# Patient Record
Sex: MALE | Race: BLACK OR AFRICAN AMERICAN | Employment: FULL TIME | URBAN - METROPOLITAN AREA
[De-identification: names, ages, dates, MRNs, and addresses within clinical notes are randomized per-mention and may not be internally consistent; named-entity substitution may affect disease eponyms.]

---

## 2017-01-04 ENCOUNTER — DOCUMENTATION ONLY (OUTPATIENT)
Dept: INTERNAL MEDICINE CLINIC | Age: 32
End: 2017-01-04

## 2017-01-04 NOTE — PROGRESS NOTES
NN chart review reveals pt has not responded to this writer contact attempts nor does he see PCP regularly. He has not been hospitalized nor seen in the ED for the same matter. This writer will close this episode.

## 2017-01-23 RX ORDER — LISINOPRIL AND HYDROCHLOROTHIAZIDE 20; 25 MG/1; MG/1
TABLET ORAL
Qty: 60 TAB | Refills: 0 | Status: ON HOLD | OUTPATIENT
Start: 2017-01-23 | End: 2017-07-06

## 2017-07-05 ENCOUNTER — HOSPITAL ENCOUNTER (INPATIENT)
Age: 32
LOS: 1 days | Discharge: HOME | DRG: 287 | End: 2017-07-07
Attending: EMERGENCY MEDICINE | Admitting: STUDENT IN AN ORGANIZED HEALTH CARE EDUCATION/TRAINING PROGRAM
Payer: SELF-PAY

## 2017-07-05 ENCOUNTER — APPOINTMENT (OUTPATIENT)
Dept: NUCLEAR MEDICINE | Age: 32
DRG: 287 | End: 2017-07-05
Attending: INTERNAL MEDICINE
Payer: SELF-PAY

## 2017-07-05 ENCOUNTER — APPOINTMENT (OUTPATIENT)
Dept: GENERAL RADIOLOGY | Age: 32
DRG: 287 | End: 2017-07-05
Attending: EMERGENCY MEDICINE
Payer: SELF-PAY

## 2017-07-05 DIAGNOSIS — I15.9 SECONDARY HYPERTENSION: Primary | ICD-10-CM

## 2017-07-05 DIAGNOSIS — R77.8 ELEVATED TROPONIN: ICD-10-CM

## 2017-07-05 PROBLEM — I10 UNCONTROLLED HYPERTENSION: Status: ACTIVE | Noted: 2017-07-05

## 2017-07-05 PROBLEM — R07.9 CHEST PAIN: Status: ACTIVE | Noted: 2017-07-05

## 2017-07-05 LAB
ALBUMIN SERPL BCP-MCNC: 3.2 G/DL (ref 3.5–5)
ALBUMIN/GLOB SERPL: 0.9 {RATIO} (ref 1.1–2.2)
ALP SERPL-CCNC: 52 U/L (ref 45–117)
ALT SERPL-CCNC: 48 U/L (ref 12–78)
AMPHET UR QL SCN: NEGATIVE
ANION GAP BLD CALC-SCNC: 6 MMOL/L (ref 5–15)
APPEARANCE UR: CLEAR
AST SERPL W P-5'-P-CCNC: 36 U/L (ref 15–37)
ATRIAL RATE: 77 BPM
BACTERIA URNS QL MICRO: NEGATIVE /HPF
BARBITURATES UR QL SCN: NEGATIVE
BASOPHILS # BLD AUTO: 0 K/UL (ref 0–0.1)
BASOPHILS # BLD: 0 % (ref 0–1)
BENZODIAZ UR QL: NEGATIVE
BILIRUB SERPL-MCNC: 0.5 MG/DL (ref 0.2–1)
BILIRUB UR QL: NEGATIVE
BUN SERPL-MCNC: 9 MG/DL (ref 6–20)
BUN/CREAT SERPL: 10 (ref 12–20)
CALCIUM SERPL-MCNC: 8.3 MG/DL (ref 8.5–10.1)
CALCULATED P AXIS, ECG09: 41 DEGREES
CALCULATED R AXIS, ECG10: 15 DEGREES
CALCULATED T AXIS, ECG11: -142 DEGREES
CANNABINOIDS UR QL SCN: POSITIVE
CHLORIDE SERPL-SCNC: 102 MMOL/L (ref 97–108)
CK MB CFR SERPL CALC: 0.5 % (ref 0–2.5)
CK MB CFR SERPL CALC: 0.5 % (ref 0–2.5)
CK MB SERPL-MCNC: 3.1 NG/ML (ref 5–25)
CK MB SERPL-MCNC: 3.7 NG/ML (ref 5–25)
CK SERPL-CCNC: 639 U/L (ref 39–308)
CK SERPL-CCNC: 639 U/L (ref 39–308)
CK SERPL-CCNC: 696 U/L (ref 39–308)
CO2 SERPL-SCNC: 32 MMOL/L (ref 21–32)
COCAINE UR QL SCN: NEGATIVE
COLOR UR: NORMAL
CREAT SERPL-MCNC: 0.89 MG/DL (ref 0.7–1.3)
DIAGNOSIS, 93000: NORMAL
DRUG SCRN COMMENT,DRGCM: ABNORMAL
EOSINOPHIL # BLD: 0.2 K/UL (ref 0–0.4)
EOSINOPHIL NFR BLD: 4 % (ref 0–7)
EPITH CASTS URNS QL MICRO: NORMAL /LPF
ERYTHROCYTE [DISTWIDTH] IN BLOOD BY AUTOMATED COUNT: 12.7 % (ref 11.5–14.5)
GLOBULIN SER CALC-MCNC: 3.7 G/DL (ref 2–4)
GLUCOSE SERPL-MCNC: 144 MG/DL (ref 65–100)
GLUCOSE UR STRIP.AUTO-MCNC: NEGATIVE MG/DL
HCT VFR BLD AUTO: 44.9 % (ref 36.6–50.3)
HGB BLD-MCNC: 15.3 G/DL (ref 12.1–17)
HGB UR QL STRIP: NEGATIVE
HYALINE CASTS URNS QL MICRO: NORMAL /LPF (ref 0–5)
KETONES UR QL STRIP.AUTO: NEGATIVE MG/DL
LEUKOCYTE ESTERASE UR QL STRIP.AUTO: NEGATIVE
LYMPHOCYTES # BLD AUTO: 49 % (ref 12–49)
LYMPHOCYTES # BLD: 2.9 K/UL (ref 0.8–3.5)
MCH RBC QN AUTO: 29.4 PG (ref 26–34)
MCHC RBC AUTO-ENTMCNC: 34.1 G/DL (ref 30–36.5)
MCV RBC AUTO: 86.3 FL (ref 80–99)
METHADONE UR QL: NEGATIVE
MONOCYTES # BLD: 0.5 K/UL (ref 0–1)
MONOCYTES NFR BLD AUTO: 9 % (ref 5–13)
NEUTS SEG # BLD: 2.2 K/UL (ref 1.8–8)
NEUTS SEG NFR BLD AUTO: 38 % (ref 32–75)
NITRITE UR QL STRIP.AUTO: NEGATIVE
OPIATES UR QL: NEGATIVE
P-R INTERVAL, ECG05: 164 MS
PCP UR QL: NEGATIVE
PH UR STRIP: 7 [PH] (ref 5–8)
PLATELET # BLD AUTO: 176 K/UL (ref 150–400)
POTASSIUM SERPL-SCNC: 3.1 MMOL/L (ref 3.5–5.1)
PROT SERPL-MCNC: 6.9 G/DL (ref 6.4–8.2)
PROT UR STRIP-MCNC: NEGATIVE MG/DL
Q-T INTERVAL, ECG07: 414 MS
QRS DURATION, ECG06: 104 MS
QTC CALCULATION (BEZET), ECG08: 468 MS
RBC # BLD AUTO: 5.2 M/UL (ref 4.1–5.7)
RBC #/AREA URNS HPF: NORMAL /HPF (ref 0–5)
SODIUM SERPL-SCNC: 140 MMOL/L (ref 136–145)
SP GR UR REFRACTOMETRY: 1.01 (ref 1–1.03)
TROPONIN I SERPL-MCNC: 0.07 NG/ML
TROPONIN I SERPL-MCNC: 0.07 NG/ML
TROPONIN I SERPL-MCNC: 0.08 NG/ML
UA: UC IF INDICATED,UAUC: NORMAL
UROBILINOGEN UR QL STRIP.AUTO: 0.2 EU/DL (ref 0.2–1)
VENTRICULAR RATE, ECG03: 77 BPM
WBC # BLD AUTO: 5.9 K/UL (ref 4.1–11.1)
WBC URNS QL MICRO: NORMAL /HPF (ref 0–4)

## 2017-07-05 PROCEDURE — 74011250637 HC RX REV CODE- 250/637: Performed by: EMERGENCY MEDICINE

## 2017-07-05 PROCEDURE — 93005 ELECTROCARDIOGRAM TRACING: CPT

## 2017-07-05 PROCEDURE — 84484 ASSAY OF TROPONIN QUANT: CPT | Performed by: EMERGENCY MEDICINE

## 2017-07-05 PROCEDURE — 85025 COMPLETE CBC W/AUTO DIFF WBC: CPT | Performed by: EMERGENCY MEDICINE

## 2017-07-05 PROCEDURE — 36415 COLL VENOUS BLD VENIPUNCTURE: CPT | Performed by: HOSPITALIST

## 2017-07-05 PROCEDURE — 71020 XR CHEST PA LAT: CPT

## 2017-07-05 PROCEDURE — 74011250637 HC RX REV CODE- 250/637: Performed by: INTERNAL MEDICINE

## 2017-07-05 PROCEDURE — 96374 THER/PROPH/DIAG INJ IV PUSH: CPT

## 2017-07-05 PROCEDURE — 93306 TTE W/DOPPLER COMPLETE: CPT

## 2017-07-05 PROCEDURE — 74011250636 HC RX REV CODE- 250/636: Performed by: INTERNAL MEDICINE

## 2017-07-05 PROCEDURE — 99218 HC RM OBSERVATION: CPT

## 2017-07-05 PROCEDURE — 80053 COMPREHEN METABOLIC PANEL: CPT | Performed by: EMERGENCY MEDICINE

## 2017-07-05 PROCEDURE — 99285 EMERGENCY DEPT VISIT HI MDM: CPT

## 2017-07-05 PROCEDURE — 81001 URINALYSIS AUTO W/SCOPE: CPT | Performed by: INTERNAL MEDICINE

## 2017-07-05 PROCEDURE — 80307 DRUG TEST PRSMV CHEM ANLYZR: CPT | Performed by: INTERNAL MEDICINE

## 2017-07-05 PROCEDURE — 74011250636 HC RX REV CODE- 250/636: Performed by: EMERGENCY MEDICINE

## 2017-07-05 PROCEDURE — 82553 CREATINE MB FRACTION: CPT | Performed by: EMERGENCY MEDICINE

## 2017-07-05 PROCEDURE — 82550 ASSAY OF CK (CPK): CPT | Performed by: EMERGENCY MEDICINE

## 2017-07-05 RX ORDER — CLONIDINE HYDROCHLORIDE 0.1 MG/1
0.1 TABLET ORAL 2 TIMES DAILY
Status: DISCONTINUED | OUTPATIENT
Start: 2017-07-05 | End: 2017-07-05

## 2017-07-05 RX ORDER — HYDRALAZINE HYDROCHLORIDE 20 MG/ML
10 INJECTION INTRAMUSCULAR; INTRAVENOUS
Status: COMPLETED | OUTPATIENT
Start: 2017-07-05 | End: 2017-07-05

## 2017-07-05 RX ORDER — HYDRALAZINE HYDROCHLORIDE 20 MG/ML
20 INJECTION INTRAMUSCULAR; INTRAVENOUS
Status: DISCONTINUED | OUTPATIENT
Start: 2017-07-05 | End: 2017-07-07 | Stop reason: HOSPADM

## 2017-07-05 RX ORDER — ASPIRIN 81 MG/1
81 TABLET ORAL DAILY
Status: DISCONTINUED | OUTPATIENT
Start: 2017-07-06 | End: 2017-07-07 | Stop reason: HOSPADM

## 2017-07-05 RX ORDER — SODIUM CHLORIDE 0.9 % (FLUSH) 0.9 %
5-10 SYRINGE (ML) INJECTION EVERY 8 HOURS
Status: DISCONTINUED | OUTPATIENT
Start: 2017-07-05 | End: 2017-07-07 | Stop reason: HOSPADM

## 2017-07-05 RX ORDER — CLONIDINE HYDROCHLORIDE 0.1 MG/1
0.1 TABLET ORAL 2 TIMES DAILY
Status: DISCONTINUED | OUTPATIENT
Start: 2017-07-05 | End: 2017-07-06

## 2017-07-05 RX ORDER — POTASSIUM CHLORIDE 750 MG/1
40 TABLET, FILM COATED, EXTENDED RELEASE ORAL
Status: COMPLETED | OUTPATIENT
Start: 2017-07-05 | End: 2017-07-05

## 2017-07-05 RX ORDER — SODIUM CHLORIDE 0.9 % (FLUSH) 0.9 %
5-10 SYRINGE (ML) INJECTION AS NEEDED
Status: DISCONTINUED | OUTPATIENT
Start: 2017-07-05 | End: 2017-07-07 | Stop reason: HOSPADM

## 2017-07-05 RX ORDER — HYDROCHLOROTHIAZIDE 25 MG/1
25 TABLET ORAL
Status: COMPLETED | OUTPATIENT
Start: 2017-07-05 | End: 2017-07-05

## 2017-07-05 RX ORDER — NITROGLYCERIN 0.4 MG/1
0.4 TABLET SUBLINGUAL
Status: DISCONTINUED | OUTPATIENT
Start: 2017-07-05 | End: 2017-07-07 | Stop reason: HOSPADM

## 2017-07-05 RX ORDER — ACETAMINOPHEN 325 MG/1
650 TABLET ORAL
Status: DISCONTINUED | OUTPATIENT
Start: 2017-07-05 | End: 2017-07-07 | Stop reason: HOSPADM

## 2017-07-05 RX ORDER — AMLODIPINE BESYLATE 5 MG/1
5 TABLET ORAL
Status: COMPLETED | OUTPATIENT
Start: 2017-07-05 | End: 2017-07-05

## 2017-07-05 RX ORDER — SODIUM CHLORIDE 0.9 % (FLUSH) 0.9 %
20 SYRINGE (ML) INJECTION
Status: DISCONTINUED | OUTPATIENT
Start: 2017-07-05 | End: 2017-07-05

## 2017-07-05 RX ORDER — LISINOPRIL 10 MG/1
20 TABLET ORAL
Status: COMPLETED | OUTPATIENT
Start: 2017-07-05 | End: 2017-07-05

## 2017-07-05 RX ADMIN — CLONIDINE HYDROCHLORIDE 0.1 MG: 0.1 TABLET ORAL at 22:50

## 2017-07-05 RX ADMIN — Medication 10 ML: at 13:13

## 2017-07-05 RX ADMIN — ENOXAPARIN SODIUM 160 MG: 100 INJECTION SUBCUTANEOUS at 13:12

## 2017-07-05 RX ADMIN — AMLODIPINE BESYLATE 5 MG: 5 TABLET ORAL at 08:38

## 2017-07-05 RX ADMIN — Medication 10 ML: at 22:47

## 2017-07-05 RX ADMIN — HYDROCHLOROTHIAZIDE 25 MG: 25 TABLET ORAL at 08:38

## 2017-07-05 RX ADMIN — LISINOPRIL 20 MG: 10 TABLET ORAL at 08:38

## 2017-07-05 RX ADMIN — CLONIDINE HYDROCHLORIDE 0.1 MG: 0.1 TABLET ORAL at 11:27

## 2017-07-05 RX ADMIN — HYDRALAZINE HYDROCHLORIDE 10 MG: 20 INJECTION INTRAMUSCULAR; INTRAVENOUS at 09:49

## 2017-07-05 RX ADMIN — ENOXAPARIN SODIUM 160 MG: 100 INJECTION SUBCUTANEOUS at 23:16

## 2017-07-05 RX ADMIN — POTASSIUM CHLORIDE 40 MEQ: 750 TABLET, FILM COATED, EXTENDED RELEASE ORAL at 22:46

## 2017-07-05 NOTE — CONSULTS
Consult Note    Date of  Admission: 7/5/2017  8:07 AM     Jeff Mcqueen is a 33 y.o. male admitted for Uncontrolled hypertension  Chest pain. Consult requested by Noy Sanchez MD    Assessment  · Hypertensive urgency due to noncompliance ( inability to get prescriptions refilled x 2 months due to change in PCP)  · Atypical chest pain mostly at rest for 2 months, left breast area, abnormal EKG suggestive of lateral ischemia;   · Elevated troponin 0.04 then 0.08  · Normal aortic silhouette on CXR    Recommendations:  1. Admit to hospitalist  2. Serial troponin  3. Stress testing tomorrow if no significant rise in markers, cath if there is a significant rise   4. Reestablish BP control  Thank you for this referral.  Jose R Walls MD  Subjective:  Chest pain x 2 months    Patient Active Problem List    Diagnosis Date Noted    Chest pain 07/05/2017    Uncontrolled hypertension 07/05/2017      Past Medical History:   Diagnosis Date    Depression     Hypertension       History reviewed. No pertinent surgical history. No Known Allergies          Review of Symptoms:  Constitutional: negative for fevers, chills, sweats, fatigue, malaise, anorexia and weight loss  Cardiac: chest pain described as low left breast, ; pertinent negatives - dyspnea, palpitations, syncope, fatigue, orthopnea, paroxysmal nocturnal dyspnea, exertional chest pressure/discomfort, claudication, lower extremity edema, tachypnea, dyspnea on exertion, dizziness  Respiratory: negative for cough or dyspnea on exertion  Gastrointestinal: negative for dysphagia, odynophagia, dyspepsia, reflux symptoms, nausea and vomiting  Musculoskeletal:negative for myalgias, arthralgias, stiff joints, neck pain and back pain  Neurological: negative for dizziness, vertigo and seizures  Other systems reviewed and negative except as above.     Physical Exam    Visit Vitals    BP (!) 174/115    Pulse 95    Temp 98.1 °F (36.7 °C)    Resp 28    Ht 5' 11\" (1.803 m)    Wt (!) 160.6 kg (354 lb 2 oz)    SpO2 98%    BMI 49.39 kg/m2     Neck: supple, symmetrical, trachea midline, no adenopathy, thyroid: not enlarged, symmetric, no tenderness/mass/nodules, no carotid bruit and no JVD  Heart: normal apical impulse, regular rate and rhythm, S1, S2 normal, S4 present  Lungs: clear to auscultation bilaterally, normal percussion bilaterally  Abdomen: soft, non-tender.  Bowel sounds normal. No masses,  no organomegaly  Extremities: extremities normal, atraumatic, no cyanosis or edema  Pulses: 2+ and symmetric  Neurologic: Grossly normal    Cardiographics    Telemetry: normal sinus rhythm  ECG: normal sinus rhythm, ischemic changes noted in inferolateral leads  Echocardiogram: Not done    Recent radiology, intake/output and wt reviewed    Lab Results  Component Value Date/Time   WBC 5.9 07/05/2017 08:41 AM   HGB 15.3 07/05/2017 08:41 AM   Hemoglobin (POC) 15.6 08/02/2010 07:05 PM   HCT 44.9 07/05/2017 08:41 AM   Hematocrit (POC) 46 08/02/2010 07:05 PM   PLATELET 694 02/50/3719 08:41 AM   MCV 86.3 07/05/2017 08:41 AM   Lab Results  Component Value Date/Time   Glucose 144 07/05/2017 08:41 AM   Glucose (POC) 90 08/02/2010 07:05 PM   Glucose (POC) 104 03/29/2010 07:47 PM   Glucose  10/31/2014 10:58 AM   LDL, calculated 126 01/20/2016 12:00 AM   Creatinine (POC) 0.9 08/02/2010 07:05 PM   Creatinine 0.89 07/05/2017 08:41 AM      Lab Results  Component Value Date/Time   Cholesterol, total 213 01/20/2016 12:00 AM   HDL Cholesterol 51 01/20/2016 12:00 AM   LDL, calculated 126 01/20/2016 12:00 AM   Triglyceride 180 01/20/2016 12:00 AM   No results found for: INR, PTMR, PTP, PT1, PT2   Lab Results  Component Value Date/Time   GFR est non-AA >60 07/05/2017 08:41 AM   GFRNA, POC >60 08/02/2010 07:05 PM   GFR est AA >60 07/05/2017 08:41 AM   GFRAA, POC >60 08/02/2010 07:05 PM   Creatinine 0.89 07/05/2017 08:41 AM   Creatinine (POC) 0.9 08/02/2010 07:05 PM   BUN 9 07/05/2017 08:41 AM BUN (POC) 9 08/02/2010 07:05 PM   Sodium 140 07/05/2017 08:41 AM   Sodium (POC) 138 08/02/2010 07:05 PM   Potassium 3.1 07/05/2017 08:41 AM   Potassium (POC) 3.5 08/02/2010 07:05 PM   Chloride 102 07/05/2017 08:41 AM   Chloride (POC) 101 08/02/2010 07:05 PM   CO2 32 07/05/2017 08:41 AM

## 2017-07-05 NOTE — PROGRESS NOTES
Admission Medication Reconciliation:    Information obtained from: Patient, chart review (4/25, Dr. Corry Garcia, conversation note)    Significant PMH/Disease States:   Past Medical History:   Diagnosis Date    Depression     Hypertension        Chief Complaint for this Admission:  Cough    Allergies:  Review of patient's allergies indicates no known allergies. Prior to Admission Medications:   Prior to Admission Medications   Prescriptions Last Dose Informant Patient Reported? Taking?   lisinopril-hydroCHLOROthiazide (PRINZIDE, ZESTORETIC) 20-25 mg per tablet 5/5/2017  No No   Sig: TAKE TWO TABLETS BY MOUTH ONCE DAILY      Facility-Administered Medications: None         Comments/Recommendations:   Reviewed allergie with patient (NKDA). Patient states that she hasn't taken lisinpril-HCTZ in ~2 months, as she \"can't reach physician for refills\". Deleted:  1. Albuterol (not used >6 months)  2. Amlodipine (does not take)  3. Paxil (\"makes me feel bad\")    Thank you for allowing me to participate in the care of this patient.     Fran Jacobs, Emile

## 2017-07-05 NOTE — H&P
History & Physical    Date of admission: 7/5/2017    Patient name: Sena Corral  MRN: [id number]  YOB: 1985  Age: 33 y.o. Primary care provider:  Robson Saavedra MD     Source of Information: patient, medical records                       Chief complain: chest pain    History of present illness  Sena Corral is a 33 y.o. male who presents with PMHx of HTN, off meds for 2 months as his PCP was retiring and could not find another. Pt presented to ER today due to chest pain on/off for 2 weeks intermittent, slightly pressures, with numbness and tingling of both hands. Pt denies any sob, diaphoresis, dizziness, lightheadedness. Pt did complain of HAs and Nause. No abdominal pain, diarrhea, LOC, weakness. Past Medical History:   Diagnosis Date    Depression     Hypertension       History reviewed. No pertinent surgical history. Prior to Admission medications    Medication Sig Start Date End Date Taking? Authorizing Provider   lisinopril-hydroCHLOROthiazide (PRINZIDE, ZESTORETIC) 20-25 mg per tablet TAKE TWO TABLETS BY MOUTH ONCE DAILY 1/23/17   Robson Saavedra MD     No Known Allergies   Family History   Problem Relation Age of Onset    Hypertension Mother     Hypertension Father       Family history reviewed and non-contributory. Social history  Patient resides  X  Independently      With family care      Assisted living      SNF    Ambulates  X  Independently      With cane       Assisted walker         Alcohol history     None   X  Social     Chronic   Smoking history    None     Former smoker   X  Current smoker     History   Smoking Status    Light Tobacco Smoker    Types: Cigarettes   Smokeless Tobacco    Never Used       Code status  X  Full code     DNR/DNI        Code status discussed with the patient/caregivers.   Full Code    Review of systems  The patient denies any fever, chills, chest pain, cough, congestion, recent illness, palpitations, or dysuria. A comprehensive review of systems was negative except for that written in the History of Present Illness. The remainder of the review of systems was reviewed and is noncontributory. Physical Examination   Visit Vitals    /69    Pulse 95    Temp 98.9 °F (37.2 °C)    Resp 16    Ht 5' 11\" (1.803 m)    Wt (!) 160.6 kg (354 lb 2 oz)    SpO2 98%    BMI 49.39 kg/m2          O2 Device: Room air    General:  Alert, cooperative, no distress   Head:  Normocephalic, without obvious abnormality, atraumatic   Eyes:  Conjunctivae/corneas clear. PERRL, EOMs intact   E/N/M/T: Nares normal. Septum midline. No nasal drainage or sinus tenderness  Lips, mucosa, and tongue normal   Teeth and gums normal  Clear oropharynx   Neck: Normal appearance and movements, symmetrical, trachea midline  No palpable adenopathy  No thyroid enlargement, tenderness or nodules  No carotid bruit   Normal JVP   Lungs:   Symmetrical chest expansion and respiratory effort  Clear to auscultation bilaterally   Chest wall:  No tenderness or deformity   Heart:  Regular rhythm   Sounds normal; no murmur, click, rub or gallop   Abdomen:   Soft, no tenderness  Bowel sounds normal  No masses or hepatosplenomegaly  No hernias present   Back: No CVA tenderness   Extremities: Extremities normal, atraumatic  No cyanosis or edema  No DVT signs   Pulses 2+ and symmetric all extremities   Musculo-      skeletal: Gait not tested  Normal symmetry, ROM, strength and tone   Neuro: Normal cranial nerves  Normal reflexes and sensation   Psych: Alert, oriented x3  Normal affect, judgement and insight   Geniturinary: deferred     Data Review    EKG:  NSR with PAC 77bpm, TWI in inferior and I and V6 which are old.      24 Hour Results:  Recent Results (from the past 24 hour(s))   EKG, 12 LEAD, INITIAL    Collection Time: 07/05/17  8:32 AM   Result Value Ref Range    Ventricular Rate 77 BPM    Atrial Rate 77 BPM    P-R Interval 164 ms    QRS Duration 104 ms    Q-T Interval 414 ms    QTC Calculation (Bezet) 468 ms    Calculated P Axis 41 degrees    Calculated R Axis 15 degrees    Calculated T Axis -142 degrees    Diagnosis       Sinus rhythm with premature atrial complexes  ST & T wave abnormality, consider inferolateral ischemia  Prolonged QT  When compared with ECG of 12-MAY-2015 18:00,  No significant change    Confirmed by Devonte Qureshi M.D., [facility] [employer] ([id number]) on 7/5/2017 10:43:39 AM     CBC WITH AUTOMATED DIFF    Collection Time: 07/05/17  8:41 AM   Result Value Ref Range    WBC 5.9 4.1 - 11.1 K/uL    RBC 5.20 4. 10 - 5.70 M/uL    HGB 15.3 12.1 - 17.0 g/dL    HCT 44.9 36.6 - 50.3 %    MCV 86.3 80.0 - 99.0 FL    MCH 29.4 26.0 - 34.0 PG    MCHC 34.1 30.0 - 36.5 g/dL    RDW 12.7 11.5 - 14.5 %    PLATELET 006 427 - 019 K/uL    NEUTROPHILS 38 32 - 75 %    LYMPHOCYTES 49 12 - 49 %    MONOCYTES 9 5 - 13 %    EOSINOPHILS 4 0 - 7 %    BASOPHILS 0 0 - 1 %    ABS. NEUTROPHILS 2.2 1.8 - 8.0 K/UL    ABS. LYMPHOCYTES 2.9 0.8 - 3.5 K/UL    ABS. MONOCYTES 0.5 0.0 - 1.0 K/UL    ABS. EOSINOPHILS 0.2 0.0 - 0.4 K/UL    ABS. BASOPHILS 0.0 0.0 - 0.1 K/UL   METABOLIC PANEL, COMPREHENSIVE    Collection Time: 07/05/17  8:41 AM   Result Value Ref Range    Sodium 140 136 - 145 mmol/L    Potassium 3.1 (L) 3.5 - 5.1 mmol/L    Chloride 102 97 - 108 mmol/L    CO2 32 21 - 32 mmol/L    Anion gap 6 5 - 15 mmol/L    Glucose 144 (H) 65 - 100 mg/dL    BUN 9 6 - 20 MG/DL    Creatinine 0.89 0.70 - 1.30 MG/DL    BUN/Creatinine ratio 10 (L) 12 - 20      GFR est AA >60 >60 ml/min/1.73m2    GFR est non-AA >60 >60 ml/min/1.73m2    Calcium 8.3 (L) 8.5 - 10.1 MG/DL    Bilirubin, total 0.5 0.2 - 1.0 MG/DL    ALT (SGPT) 48 12 - 78 U/L    AST (SGOT) 36 15 - 37 U/L    Alk.  phosphatase 52 45 - 117 U/L    Protein, total 6.9 6.4 - 8.2 g/dL    Albumin 3.2 (L) 3.5 - 5.0 g/dL    Globulin 3.7 2.0 - 4.0 g/dL    A-G Ratio 0.9 (L) 1.1 - 2.2     CK W/ REFLX CKMB    Collection Time: 07/05/17  8:41 AM   Result Value Ref Range     (H) 39 - 308 U/L   TROPONIN I    Collection Time: 07/05/17  8:41 AM   Result Value Ref Range    Troponin-I, Qt. 0.08 (H) <0.05 ng/mL   CK-MB,QUANT.     Collection Time: 07/05/17  8:41 AM   Result Value Ref Range    CK - MB 3.7 (H) <3.6 NG/ML    CK-MB Index 0.5 0 - 2.5     DRUG SCREEN, URINE    Collection Time: 07/05/17 11:49 AM   Result Value Ref Range    AMPHETAMINE NEGATIVE  NEG      BARBITURATES NEGATIVE  NEG      BENZODIAZEPINE NEGATIVE  NEG      COCAINE NEGATIVE  NEG      METHADONE NEGATIVE  NEG      OPIATES NEGATIVE  NEG      PCP(PHENCYCLIDINE) NEGATIVE  NEG      THC (TH-CANNABINOL) POSITIVE (A) NEG      Drug screen comment (NOTE)    URINALYSIS W/ REFLEX CULTURE    Collection Time: 07/05/17 11:49 AM   Result Value Ref Range    Color YELLOW/STRAW      Appearance CLEAR CLEAR      Specific gravity 1.011 1.003 - 1.030      pH (UA) 7.0 5.0 - 8.0      Protein NEGATIVE  NEG mg/dL    Glucose NEGATIVE  NEG mg/dL    Ketone NEGATIVE  NEG mg/dL    Bilirubin NEGATIVE  NEG      Blood NEGATIVE  NEG      Urobilinogen 0.2 0.2 - 1.0 EU/dL    Nitrites NEGATIVE  NEG      Leukocyte Esterase NEGATIVE  NEG      WBC 0-4 0 - 4 /hpf    RBC 0-5 0 - 5 /hpf    Epithelial cells FEW FEW /lpf    Bacteria NEGATIVE  NEG /hpf    UA:UC IF INDICATED CULTURE NOT INDICATED BY UA RESULT CNI      Hyaline cast 0-2 0 - 5 /lpf   CK W/ REFLX CKMB    Collection Time: 07/05/17  1:14 PM   Result Value Ref Range     (H) 39 - 308 U/L   CK    Collection Time: 07/05/17  1:14 PM   Result Value Ref Range     (H) 39 - 308 U/L   TROPONIN I    Collection Time: 07/05/17  1:14 PM   Result Value Ref Range    Troponin-I, Qt. 0.07 (H) <0.05 ng/mL     Recent Labs      07/05/17   0841   WBC  5.9   HGB  15.3   HCT  44.9   PLT  176     Recent Labs      07/05/17   0841   NA  140   K  3.1*   CL  102   CO2  32   GLU  144*   BUN  9   CREA  0.89   CA  8.3*   ALB  3.2*   TBILI  0.5   SGOT  36   ALT  48 Imaging        Assessment and Plan   Active Problems:    Chest pain (7/5/2017)      Uncontrolled hypertension (7/5/2017)      1. Chest pain, r/o ACS  - stress today  - cardiology consulted by ER  - c/w ASA and Lovenox BID   - Troponin cycle    2. Hypertensive Urgency  - due to med noncompliance  - started Clonidine BID and resume Lisinopril/Hydrochlorothiazide  - ECHO : nl EF    3.  Elevated Troponin, likely related to Uncontrolled HTN   - trend trop  - see above    Diet: cardiac  Activity: as tolreated  DVT prophylaxis: lovenox  Isolation precautions: none  Consultations: cardiology  Anticipated disposition: in AM if stress negative and BP stable         Signed by: Michel Ferguson MD    July 5, 2017 at 2:34 PM

## 2017-07-05 NOTE — ED PROVIDER NOTES
HPI Comments: 33 y.o. male with past medical history significant for HTN and depression who presents ambulatory from home with chief complaint of chest pain. Pt reports 2-month history of daily chest pain when he wakes up that lasts approximately 30 minutes. Pt suspects chest pain is related to non-compliance with blood pressure medication. Pt states he has been unable to refill prescription because his PCP moved and he has been unable to get an appointment with another provider. Pt states called their office today and was referred to the ED for a 30-day supply of his medication. Pt also reports ongoing mild going and increased urinary frequency. Pt specifically denies fever, chills, rhinorrhea, sore throat, SOB and changes in his bowels. There are no other acute medical concerns at this time. Social hx: light tobacco smoker; uses EtOH socially; denies illicit drug use  PCP: Dougie Salazar MD    Note written by Chloe Cerna, as dictated by Ally Knox MD 8:24 AM        The history is provided by the patient. Past Medical History:   Diagnosis Date    Depression     Hypertension        History reviewed. No pertinent surgical history. Family History:   Problem Relation Age of Onset    Hypertension Mother     Hypertension Father        Social History     Social History    Marital status: SINGLE     Spouse name: N/A    Number of children: N/A    Years of education: N/A     Occupational History    Not on file. Social History Main Topics    Smoking status: Light Tobacco Smoker     Types: Cigarettes    Smokeless tobacco: Never Used    Alcohol use Yes      Comment: socially    Drug use: No    Sexual activity: Not Currently     Other Topics Concern    Not on file     Social History Narrative         ALLERGIES: Review of patient's allergies indicates no known allergies. Review of Systems   Constitutional: Negative for chills and fever.    HENT: Negative for rhinorrhea and sore throat. Respiratory: Positive for cough. Negative for shortness of breath. Cardiovascular: Positive for chest pain. Gastrointestinal: Negative for abdominal pain, diarrhea, nausea and vomiting. Genitourinary: Positive for frequency. Negative for dysuria and hematuria. Musculoskeletal: Negative for arthralgias and myalgias. Skin: Negative for pallor and rash. Neurological: Negative for dizziness, weakness and light-headedness. All other systems reviewed and are negative. Vitals:    07/05/17 0811   BP: (!) 183/128   Pulse: 79   Resp: 16   Temp: 98.1 °F (36.7 °C)   SpO2: 98%   Weight: (!) 160.6 kg (354 lb 2 oz)   Height: 5' 11\" (1.803 m)            Physical Exam   Nursing note and vitals reviewed. Const:  No acute distress, well developed, well nourished  Head:  Atraumatic, normocephalic  Eyes:  PERRL, conjunctiva normal, no scleral icterus  Neck:  Supple, trachea midline  Cardiovascular:  RRR, no murmurs, no gallops, no rubs  Resp:  No resp distress, no increased work of breathing, no wheezes, no rhonchi, no rales,  Abd:  Soft, non-tender, non-distended, no rebound, no guarding, no CVA tenderness  :  Deferred  MSK:  No pedal edema, normal ROM  Neuro:  Alert and oriented x3, no cranial nerve defect  Skin:  Warm, dry, intact  Psych: normal mood and affect, behavior is normal, judgement and thought content is normal  Note written by Chloe Vargas, as dictated by Colonel Andrew MD 8:24 AM     MDM  Number of Diagnoses or Management Options  Elevated troponin:   Secondary hypertension:      Amount and/or Complexity of Data Reviewed  Clinical lab tests: ordered and reviewed  Tests in the radiology section of CPT®: ordered and reviewed    Patient Progress  Patient progress: stable    ED Course     Pt. Presents to the ER with HTN, uncontrolled and CP. Pt. Has a mildly elevated troponin. Pt's BP has improved with his home meds and hydralazine.   Pt. Seen by cardiology and to be evaluated for admission by the hospitalist.      Procedures     9:10 AM  Chest x-ray: Impression: No acute abnormality identified. 9:25 AM  Troponin 0.08. Will admit for further evaluation. Cardiology consulted. 9:56 AM  Discussed available lab and imaging results with patient. Patient verbalizes understanding and agrees with care plan. Will admit patient. CONSULT NOTE:  10:00 AM Allison Hunt MD spoke with Dr. Barbara Castañeda, Consult for Cardiology. Discussed available diagnostic tests and clinical findings. He is in agreement with care plans as outlined. Dr. Barbara Castañeda recommends admitting the patient through the hospitalist service for further admission. CONSULT NOTE:  10:00 AM Allison Hunt MD spoke with Dr. Gissell Ritter, Consult for Hospitalist.  Discussed available diagnostic tests and clinical findings. He is in agreement with care plans as outlined. Dr. Gissell Ritter will evaluate and admit the patient.

## 2017-07-05 NOTE — ED NOTES
TRANSFER - OUT REPORT:    Verbal report given to Mario Almaguer RN(name) on [employer]  being transferred to Obs(unit) for routine progression of care       Report consisted of patients Situation, Background, Assessment and   Recommendations(SBAR). Information from the following report(s) SBAR, ED Summary, [facility] and Recent Results was reviewed with the receiving nurse. Lines:   Peripheral IV 07/05/17 Left Antecubital (Active)   Site Assessment Clean, dry, & intact 7/5/2017  8:44 AM   Phlebitis Assessment 0 7/5/2017  8:44 AM   Infiltration Assessment 0 7/5/2017  8:44 AM   Dressing Status Clean, dry, & intact 7/5/2017  8:44 AM   Hub Color/Line Status Pink 7/5/2017  8:44 AM        Opportunity for questions and clarification was provided.

## 2017-07-06 ENCOUNTER — APPOINTMENT (OUTPATIENT)
Dept: NUCLEAR MEDICINE | Age: 32
DRG: 287 | End: 2017-07-06
Attending: INTERNAL MEDICINE
Payer: SELF-PAY

## 2017-07-06 LAB
ANION GAP BLD CALC-SCNC: 9 MMOL/L (ref 5–15)
ATRIAL RATE: 90 BPM
ATTENDING PHYSICIAN, CST07: NORMAL
BUN SERPL-MCNC: 9 MG/DL (ref 6–20)
BUN/CREAT SERPL: 11 (ref 12–20)
CALCIUM SERPL-MCNC: 8.3 MG/DL (ref 8.5–10.1)
CALCULATED P AXIS, ECG09: 54 DEGREES
CALCULATED R AXIS, ECG10: 44 DEGREES
CALCULATED T AXIS, ECG11: -137 DEGREES
CHLORIDE SERPL-SCNC: 99 MMOL/L (ref 97–108)
CHOLEST SERPL-MCNC: 194 MG/DL
CO2 SERPL-SCNC: 30 MMOL/L (ref 21–32)
CREAT SERPL-MCNC: 0.8 MG/DL (ref 0.7–1.3)
DIAGNOSIS, 93000: NORMAL
DIAGNOSIS, 93000: NORMAL
DUKE TM SCORE RESULT, CST14: NORMAL
DUKE TREADMILL SCORE, CST13: NORMAL
ECG INTERP BEFORE EX, CST11: NORMAL
ECG INTERP DURING EX, CST12: NORMAL
EST. AVERAGE GLUCOSE BLD GHB EST-MCNC: 123 MG/DL
FUNCTIONAL CAPACITY, CST17: NORMAL
GLUCOSE SERPL-MCNC: 96 MG/DL (ref 65–100)
HBA1C MFR BLD: 5.9 % (ref 4.2–6.3)
HDLC SERPL-MCNC: 49 MG/DL
HDLC SERPL: 4 {RATIO} (ref 0–5)
KNOWN CARDIAC CONDITION, CST08: NORMAL
LDLC SERPL CALC-MCNC: 118.6 MG/DL (ref 0–100)
LIPID PROFILE,FLP: ABNORMAL
MAGNESIUM SERPL-MCNC: 2.1 MG/DL (ref 1.6–2.4)
MAX. DIASTOLIC BP, CST04: 128 MMHG
MAX. HEART RATE, CST05: 104 BPM
MAX. SYSTOLIC BP, CST03: 213 MMHG
OVERALL BP RESPONSE TO EXERCISE, CST16: NORMAL
OVERALL HR RESPONSE TO EXERCISE, CST15: NORMAL
P-R INTERVAL, ECG05: 156 MS
PEAK EX METS, CST10: 1 METS
POTASSIUM SERPL-SCNC: 3.5 MMOL/L (ref 3.5–5.1)
PROTOCOL NAME, CST01: NORMAL
Q-T INTERVAL, ECG07: 402 MS
QRS DURATION, ECG06: 90 MS
QTC CALCULATION (BEZET), ECG08: 491 MS
SODIUM SERPL-SCNC: 138 MMOL/L (ref 136–145)
TEST INDICATION, CST09: NORMAL
TRIGL SERPL-MCNC: 132 MG/DL (ref ?–150)
VENTRICULAR RATE, ECG03: 90 BPM
VLDLC SERPL CALC-MCNC: 26.4 MG/DL

## 2017-07-06 PROCEDURE — 74011250636 HC RX REV CODE- 250/636: Performed by: INTERNAL MEDICINE

## 2017-07-06 PROCEDURE — 74011250637 HC RX REV CODE- 250/637: Performed by: HOSPITALIST

## 2017-07-06 PROCEDURE — 83036 HEMOGLOBIN GLYCOSYLATED A1C: CPT | Performed by: HOSPITALIST

## 2017-07-06 PROCEDURE — 83735 ASSAY OF MAGNESIUM: CPT | Performed by: HOSPITALIST

## 2017-07-06 PROCEDURE — 80048 BASIC METABOLIC PNL TOTAL CA: CPT | Performed by: HOSPITALIST

## 2017-07-06 PROCEDURE — 99218 HC RM OBSERVATION: CPT

## 2017-07-06 PROCEDURE — 36415 COLL VENOUS BLD VENIPUNCTURE: CPT | Performed by: HOSPITALIST

## 2017-07-06 PROCEDURE — 93017 CV STRESS TEST TRACING ONLY: CPT

## 2017-07-06 PROCEDURE — 74011250637 HC RX REV CODE- 250/637: Performed by: INTERNAL MEDICINE

## 2017-07-06 PROCEDURE — 80061 LIPID PANEL: CPT | Performed by: HOSPITALIST

## 2017-07-06 PROCEDURE — A9500 TC99M SESTAMIBI: HCPCS

## 2017-07-06 RX ORDER — ENOXAPARIN SODIUM 100 MG/ML
160 INJECTION SUBCUTANEOUS EVERY 12 HOURS
Status: DISPENSED | OUTPATIENT
Start: 2017-07-06 | End: 2017-07-07

## 2017-07-06 RX ORDER — LISINOPRIL AND HYDROCHLOROTHIAZIDE 20; 25 MG/1; MG/1
1 TABLET ORAL DAILY
Qty: 30 TAB | Refills: 0 | Status: SHIPPED | OUTPATIENT
Start: 2017-07-06 | End: 2017-08-11 | Stop reason: SDUPTHER

## 2017-07-06 RX ORDER — ATORVASTATIN CALCIUM 20 MG/1
20 TABLET, FILM COATED ORAL
Status: DISCONTINUED | OUTPATIENT
Start: 2017-07-06 | End: 2017-07-07 | Stop reason: HOSPADM

## 2017-07-06 RX ORDER — SODIUM CHLORIDE 0.9 % (FLUSH) 0.9 %
20 SYRINGE (ML) INJECTION
Status: COMPLETED | OUTPATIENT
Start: 2017-07-06 | End: 2017-07-06

## 2017-07-06 RX ORDER — CLONIDINE HYDROCHLORIDE 0.1 MG/1
0.1 TABLET ORAL 2 TIMES DAILY
Qty: 60 TAB | Refills: 0 | Status: SHIPPED | OUTPATIENT
Start: 2017-07-06 | End: 2017-08-05

## 2017-07-06 RX ORDER — CARVEDILOL 6.25 MG/1
6.25 TABLET ORAL 2 TIMES DAILY WITH MEALS
Status: DISCONTINUED | OUTPATIENT
Start: 2017-07-06 | End: 2017-07-07 | Stop reason: HOSPADM

## 2017-07-06 RX ORDER — ASPIRIN 81 MG/1
81 TABLET ORAL DAILY
Qty: 30 TAB | Refills: 0 | Status: SHIPPED | OUTPATIENT
Start: 2017-07-06 | End: 2021-02-23 | Stop reason: SDUPTHER

## 2017-07-06 RX ADMIN — HYDROCHLOROTHIAZIDE: 25 TABLET ORAL at 10:39

## 2017-07-06 RX ADMIN — Medication 10 ML: at 17:52

## 2017-07-06 RX ADMIN — CLONIDINE HYDROCHLORIDE 0.1 MG: 0.1 TABLET ORAL at 10:40

## 2017-07-06 RX ADMIN — Medication 20 ML: at 08:28

## 2017-07-06 RX ADMIN — CARVEDILOL 6.25 MG: 6.25 TABLET, FILM COATED ORAL at 17:52

## 2017-07-06 RX ADMIN — Medication 10 ML: at 05:22

## 2017-07-06 RX ADMIN — Medication 10 ML: at 21:11

## 2017-07-06 RX ADMIN — REGADENOSON 0.4 MG: 0.08 INJECTION, SOLUTION INTRAVENOUS at 08:27

## 2017-07-06 RX ADMIN — ASPIRIN 81 MG: 81 TABLET, COATED ORAL at 10:40

## 2017-07-06 RX ADMIN — ATORVASTATIN CALCIUM 20 MG: 20 TABLET, FILM COATED ORAL at 21:10

## 2017-07-06 NOTE — PROGRESS NOTES
Care Management Initial Assessment    **CM Consult - needs new PCP - call made to [facility]/Dr. Deyanne Kocher office. Patient requesting [phone] appointment. At this time Dr. Mira Ulloa doesn't have one available, appt made for July 18 1:45PM.  NN to see if they can move him up if appts become available. Patient has been no show at last appointments**    Raz Banerjee is a  33 y.o. male who presents ambulatory from home with chief complaint of chest pain. He  reports 2-month history of daily chest pain when he wakes up that lasts approximately 30 minutes. Pt suspects chest pain is related to non-compliance with blood pressure medication, per patient he called old PCP office today and was referred to the ED for a 30-day supply of his medication. ED work up:  EKG, labs, stress test, cardiac consult. Verified face sheet and demographics. Patient works at night, difficult to reach him during day when CM inquired about messages from PCP office. PCP: Robson Saavedra MD -Dr. Liborio Davis moved practices - new PCP  Dr. Arden Newton Management Interventions  PCP Verified by CM: No (No show for Dr. Liborio Davis )  Last Visit to PCP: 01/20/16  Transition of Care Consult (CM Consult): Discharge Planning, Other (PCP has left practice, needs new PCP. Kwasi Knutson works overnight at [employer]  )  MyChart Signup: No  Discharge Durable Medical Equipment: No  Health Maintenance Reviewed: Yes  Physical Therapy Consult: No  Occupational Therapy Consult: No  Speech Therapy Consult: No  Current Support Network: Lives Alone (Lives alone, independent, drives, works. Supportive sister and mother)  Plan discussed with Pt/Family/Caregiver: Yes (Met with patient in obs unit, discussed importance of PCP.   Reviewed list and Kwasi Knutson would like to see Dr. Mira Ulloa)  Freedom of Choice Offered: Yes    Carmelina Weiss RN, BSN, [place]  ED Care Management  [phone]       CM Update  7/6/17  400PM    Received call from Nurse Navigator, unable to schedule appt with  Green, he is taking a lot of Dr. Martha Simon former patients and he has no AM spots. New MD to practice Dr. Ely Becerra can see patient early in the morning.     Updated AVS.    Stewart Sandoval, RN, BSN, [facility]  ED Care Management  [phone]

## 2017-07-06 NOTE — PROGRESS NOTES
7/6/2017     Admit Date: 7/5/2017    Admit Diagnosis: Uncontrolled hypertension  Chest pain    Active Problems:    Chest pain (7/5/2017)      Uncontrolled hypertension (7/5/2017)        Assessment/Plan:  1. Abnormal MPI suggestive of inferior wall ischemia with LV EF 41%  2. HTN: much improved  Discussed the implications of MPI findings and patient opts for cc. The procedure was explained in detail. Similarly, the risks were reviewed including but not limited to: stroke, myocardial infarction, bleeding and vascular trauma, emergency surgery and contrast reactions. Subjective:  Feels better       Jeff Mcqueen denies chest pain, chest pressure/discomfort, dyspnea, orthopnea, paroxysmal nocturnal dyspnea. Objective:     Visit Vitals    /84 (BP 1 Location: Right arm, BP Patient Position: At rest)    Pulse 81    Temp 98 °F (36.7 °C)    Resp 16    Ht 5' 11\" (1.803 m)    Wt (!) 160.6 kg (354 lb 2 oz)    SpO2 100%    BMI 49.39 kg/m2        Physical Exam:  Neck: supple, symmetrical, trachea midline, no adenopathy, thyroid: not enlarged, symmetric, no tenderness/mass/nodules, no carotid bruit and no JVD  Heart: regular rate and rhythm, S1, S2 normal, S4 present, no rub  Lungs: clear to auscultation bilaterally, normal percussion bilaterally  Abdomen: soft, non-tender.  Bowel sounds normal. No masses,  no organomegaly  Extremities: extremities normal, atraumatic, no cyanosis or edema  Pulses: 2+ and symmetric  Neurologic: Grossly normal      Labs:    Recent Labs      07/05/17   2258  07/05/17   1314   CPK   --   639*   CKMB   --   3.1   TROIQ  0.07*  0.07*     Recent Labs      07/06/17   0440   NA  138   K  3.5   CL  99   BUN  9   CREA  0.80   GLU  96   CA  8.3*     Recent Labs      07/05/17   0841   WBC  5.9   HGB  15.3   HCT  44.9   PLT  176     Recent Labs      07/06/17   0440   CHOL  194   LDLC  118.6*       Telemetry: normal sinus rhythm     Data Review: current meds, labs,recent radiology, intake/output/weight and problem list reviewed

## 2017-07-06 NOTE — PROGRESS NOTES
Patient is resting in bed she denies having any chest pain, SOB, or discomfort at this time. Will continue to monitor patient for changes in her condition. 65 paged Dr. Vipul Nguyen patient's K+ is 3.1 to see if he wants to give K+ to bring it up. He called back and he ordered K+ 40 meq PO to be given. 0000 patient is resting in bed no complaints at this time. 0500 patient resting in bed no complaints at this time blood drawn and sent to lab.    0615 K+ now 3.5     0730 Bedside and Verbal shift change report given to Bryanna Kaur RN  (oncoming nurse) by Bren Scott RN  (offgoing nurse). Report included the following information SBAR, MAR, Recent Results and Med Rec Status.

## 2017-07-06 NOTE — PROGRESS NOTES
Hospitalist Progress Note  Damari Dowell NP  Office: [phone]  Cell: [phone] 7a-5p      Date of Service:  2017  NAME:  Marisol Aleman  :  1985  MRN:  [id number]      Admission Summary:   33 y.o. male who presents with PMHx of HTN, off meds for 2 months as his PCP was retiring and could not find another. Pt presented to ER today due to chest pain on/off for 2 weeks intermittent, slightly pressures, with numbness and tingling of both hands. Interval history / Subjective:     Patient denies any chest pain, numbness or tingling of both hands or any other acute complaints. Stress test abnormal. Plan for cardiac cath tomorrow. Assessment & Plan:     Chest pain  -troponin mildly elevated   -stress test abnormal, plan for cath tomorrow   -Dr Dorcas Hanson with cardiology following   -ASA and Lovenox BID     Hypertensive Urgency  -BP now improved   -elevated on admission due to noncompliance, has not taken home BP d/t not having pcp   -continue Clonidine BID and Lisinopril/HCTZ  -monitor    Morbid Obesity   -educated on diet and exercise    Code status: Full  DVT prophylaxis: Lovenox    Care Plan discussed with: Patient/Family and Nurse Dr Laila Moseley  Disposition: Home w/Family and TBD     Hospital Problems  Date Reviewed: 2016          Codes Class Noted POA    Chest pain ICD-10-CM: R07.9  ICD-9-CM: 786.50  2017 Unknown        Uncontrolled hypertension ICD-10-CM: I10  ICD-9-CM: 401.9  2017 Unknown                Review of Systems:   A comprehensive review of systems was negative except for that written in the HPI. Vital Signs:    Last 24hrs VS reviewed since prior progress note.  Most recent are:  Visit Vitals    /84 (BP 1 Location: Right arm, BP Patient Position: At rest)    Pulse 81    Temp 98 °F (36.7 °C)    Resp 16    Ht 5' 11\" (1.803 m)    Wt (!) 160.6 kg (354 lb 2 oz)    SpO2 100%    BMI 49.39 kg/m2 No intake or output data in the 24 hours ending 07/06/17 1524     Physical Examination:             Constitutional:  No acute distress, cooperative, pleasant    ENT:  Oral mucous moist, oropharynx benign. Neck supple,    Resp:  CTA bilaterally. No wheezing/rhonchi/rales. No accessory muscle use   CV:  Regular rhythm, normal rate, no murmurs, gallops, rubs    GI:  Soft, non distended, non tender. normoactive bowel sounds, no hepatosplenomegaly     Musculoskeletal:  No edema, warm, 2+ pulses throughout    Neurologic:  Moves all extremities. AAOx3, CN II-XII reviewed     Psych:  Good insight, Not anxious nor agitated. Data Review:    Review and/or order of clinical lab test  Review and/or order of tests in the radiology section of CPT  Review and/or order of tests in the medicine section of CPT      Labs:     Recent Labs      07/05/17   0841   WBC  5.9   HGB  15.3   HCT  44.9   PLT  176     Recent Labs      07/06/17   0440  07/05/17   0841   NA  138  140   K  3.5  3.1*   CL  99  102   CO2  30  32   BUN  9  9   CREA  0.80  0.89   GLU  96  144*   CA  8.3*  8.3*   MG  2.1   --      Recent Labs      07/05/17   0841   SGOT  36   ALT  48   AP  52   TBILI  0.5   TP  6.9   ALB  3.2*   GLOB  3.7     No results for input(s): INR, PTP, APTT in the last 72 hours. No lab exists for component: INREXT   No results for input(s): FE, TIBC, PSAT, FERR in the last 72 hours. No results found for: FOL, RBCF   No results for input(s): PH, PCO2, PO2 in the last 72 hours.   Recent Labs      07/05/17   2258  07/05/17   1314  07/05/17   0841   CPK   --   639*   --    CKNDX   --   0.5  0.5   TROIQ  0.07*  0.07*  0.08*     Lab Results   Component Value Date/Time    Cholesterol, total 194 07/06/2017 04:40 AM    HDL Cholesterol 49 07/06/2017 04:40 AM    LDL, calculated 118.6 07/06/2017 04:40 AM    Triglyceride 132 07/06/2017 04:40 AM    CHOL/HDL Ratio 4.0 07/06/2017 04:40 AM     Lab Results   Component Value Date/Time    Glucose (POC) 90 08/02/2010 07:05 PM    Glucose (POC) 104 03/29/2010 07:47 PM    Glucose  10/31/2014 10:58 AM     Lab Results   Component Value Date/Time    Color YELLOW/STRAW 07/05/2017 11:49 AM    Appearance CLEAR 07/05/2017 11:49 AM    Specific gravity 1.011 07/05/2017 11:49 AM    Specific gravity 1.020 03/29/2010 07:45 PM    pH (UA) 7.0 07/05/2017 11:49 AM    Protein NEGATIVE  07/05/2017 11:49 AM    Glucose NEGATIVE  07/05/2017 11:49 AM    Ketone NEGATIVE  07/05/2017 11:49 AM    Bilirubin NEGATIVE  07/05/2017 11:49 AM    Urobilinogen 0.2 07/05/2017 11:49 AM    Nitrites NEGATIVE  07/05/2017 11:49 AM    Leukocyte Esterase NEGATIVE  07/05/2017 11:49 AM    Epithelial cells FEW 07/05/2017 11:49 AM    Bacteria NEGATIVE  07/05/2017 11:49 AM    WBC 0-4 07/05/2017 11:49 AM    RBC 0-5 07/05/2017 11:49 AM         Medications Reviewed:     Current Facility-Administered Medications   Medication Dose Route Frequency    enoxaparin (LOVENOX) injection 160 mg  160 mg SubCUTAneous Q12H    atorvastatin (LIPITOR) tablet 20 mg  20 mg Oral QHS    carvedilol (COREG) tablet 6.25 mg  6.25 mg Oral BID WITH MEALS    sodium chloride (NS) flush 5-10 mL  5-10 mL IntraVENous Q8H    sodium chloride (NS) flush 5-10 mL  5-10 mL IntraVENous PRN    aspirin delayed-release tablet 81 mg  81 mg Oral DAILY    nitroglycerin (NITROSTAT) tablet 0.4 mg  0.4 mg SubLINGual Q5MIN PRN    acetaminophen (TYLENOL) tablet 650 mg  650 mg Oral Q4H PRN    hydrALAZINE (APRESOLINE) 20 mg/mL injection 20 mg  20 mg IntraVENous Q6H PRN    lisinopril/hydroCHLOROthiazide(PRINZIDE/ZESTORETIC) 20/25 mg   Oral DAILY     ______________________________________________________________________  EXPECTED LENGTH OF STAY: - - -  ACTUAL LENGTH OF STAY:          0                 Gabrielle Cerrato NP

## 2017-07-07 ENCOUNTER — APPOINTMENT (OUTPATIENT)
Dept: CARDIAC CATH/INVASIVE PROCEDURES | Age: 32
DRG: 287 | End: 2017-07-07
Payer: SELF-PAY

## 2017-07-07 VITALS
OXYGEN SATURATION: 97 % | TEMPERATURE: 98.6 F | BODY MASS INDEX: 44.1 KG/M2 | HEART RATE: 93 BPM | HEIGHT: 71 IN | RESPIRATION RATE: 16 BRPM | DIASTOLIC BLOOD PRESSURE: 85 MMHG | WEIGHT: 315 LBS | SYSTOLIC BLOOD PRESSURE: 122 MMHG

## 2017-07-07 LAB
ANION GAP BLD CALC-SCNC: 9 MMOL/L (ref 5–15)
BUN SERPL-MCNC: 14 MG/DL (ref 6–20)
BUN/CREAT SERPL: 16 (ref 12–20)
CALCIUM SERPL-MCNC: 8.3 MG/DL (ref 8.5–10.1)
CHLORIDE SERPL-SCNC: 99 MMOL/L (ref 97–108)
CO2 SERPL-SCNC: 29 MMOL/L (ref 21–32)
CREAT SERPL-MCNC: 0.87 MG/DL (ref 0.7–1.3)
GLUCOSE SERPL-MCNC: 108 MG/DL (ref 65–100)
POTASSIUM SERPL-SCNC: 3.5 MMOL/L (ref 3.5–5.1)
SODIUM SERPL-SCNC: 137 MMOL/L (ref 136–145)

## 2017-07-07 PROCEDURE — 80048 BASIC METABOLIC PNL TOTAL CA: CPT | Performed by: INTERNAL MEDICINE

## 2017-07-07 PROCEDURE — 65660000000 HC RM CCU STEPDOWN

## 2017-07-07 PROCEDURE — 77030013744

## 2017-07-07 PROCEDURE — 99218 HC RM OBSERVATION: CPT

## 2017-07-07 PROCEDURE — B2111ZZ FLUOROSCOPY OF MULTIPLE CORONARY ARTERIES USING LOW OSMOLAR CONTRAST: ICD-10-PCS | Performed by: INTERNAL MEDICINE

## 2017-07-07 PROCEDURE — 93454 CORONARY ARTERY ANGIO S&I: CPT

## 2017-07-07 PROCEDURE — 77030004532 HC CATH ANGI DX IMP BSC -A

## 2017-07-07 PROCEDURE — 36415 COLL VENOUS BLD VENIPUNCTURE: CPT | Performed by: INTERNAL MEDICINE

## 2017-07-07 PROCEDURE — 77030015766

## 2017-07-07 PROCEDURE — 77030010221 HC SPLNT WR POS TELE -B

## 2017-07-07 PROCEDURE — 74011250637 HC RX REV CODE- 250/637: Performed by: INTERNAL MEDICINE

## 2017-07-07 PROCEDURE — 74011250637 HC RX REV CODE- 250/637: Performed by: HOSPITALIST

## 2017-07-07 PROCEDURE — 99153 MOD SED SAME PHYS/QHP EA: CPT

## 2017-07-07 PROCEDURE — 74011250636 HC RX REV CODE- 250/636

## 2017-07-07 PROCEDURE — 74011636320 HC RX REV CODE- 636/320: Performed by: INTERNAL MEDICINE

## 2017-07-07 PROCEDURE — C1894 INTRO/SHEATH, NON-LASER: HCPCS

## 2017-07-07 PROCEDURE — 77030019569 HC BND COMPR RAD TERU -B

## 2017-07-07 PROCEDURE — 99152 MOD SED SAME PHYS/QHP 5/>YRS: CPT

## 2017-07-07 PROCEDURE — 4A023N7 MEASUREMENT OF CARDIAC SAMPLING AND PRESSURE, LEFT HEART, PERCUTANEOUS APPROACH: ICD-10-PCS | Performed by: INTERNAL MEDICINE

## 2017-07-07 PROCEDURE — C1769 GUIDE WIRE: HCPCS

## 2017-07-07 PROCEDURE — 74011250636 HC RX REV CODE- 250/636: Performed by: INTERNAL MEDICINE

## 2017-07-07 PROCEDURE — 74011250636 HC RX REV CODE- 250/636: Performed by: HOSPITALIST

## 2017-07-07 PROCEDURE — 74011000250 HC RX REV CODE- 250: Performed by: INTERNAL MEDICINE

## 2017-07-07 RX ORDER — SODIUM CHLORIDE 0.9 % (FLUSH) 0.9 %
10 SYRINGE (ML) INJECTION AS NEEDED
Status: DISCONTINUED | OUTPATIENT
Start: 2017-07-07 | End: 2017-07-07 | Stop reason: ALTCHOICE

## 2017-07-07 RX ORDER — CLOPIDOGREL 300 MG/1
600 TABLET, FILM COATED ORAL
Status: DISCONTINUED | OUTPATIENT
Start: 2017-07-07 | End: 2017-07-07 | Stop reason: ALTCHOICE

## 2017-07-07 RX ORDER — VERAPAMIL HYDROCHLORIDE 2.5 MG/ML
2.5-5 INJECTION, SOLUTION INTRAVENOUS
Status: DISCONTINUED | OUTPATIENT
Start: 2017-07-07 | End: 2017-07-07 | Stop reason: ALTCHOICE

## 2017-07-07 RX ORDER — FENTANYL CITRATE 50 UG/ML
25-200 INJECTION, SOLUTION INTRAMUSCULAR; INTRAVENOUS
Status: DISCONTINUED | OUTPATIENT
Start: 2017-07-07 | End: 2017-07-07 | Stop reason: ALTCHOICE

## 2017-07-07 RX ORDER — ATROPINE SULFATE 0.1 MG/ML
1 INJECTION INTRAVENOUS AS NEEDED
Status: DISCONTINUED | OUTPATIENT
Start: 2017-07-07 | End: 2017-07-07 | Stop reason: ALTCHOICE

## 2017-07-07 RX ORDER — HEPARIN SODIUM 1000 [USP'U]/ML
INJECTION, SOLUTION INTRAVENOUS; SUBCUTANEOUS
Status: COMPLETED
Start: 2017-07-07 | End: 2017-07-07

## 2017-07-07 RX ORDER — SODIUM CHLORIDE 9 MG/ML
1.5 INJECTION, SOLUTION INTRAVENOUS CONTINUOUS
Status: DISPENSED | OUTPATIENT
Start: 2017-07-07 | End: 2017-07-07

## 2017-07-07 RX ORDER — HEPARIN SODIUM 200 [USP'U]/100ML
1000 INJECTION, SOLUTION INTRAVENOUS
Status: DISCONTINUED | OUTPATIENT
Start: 2017-07-07 | End: 2017-07-07 | Stop reason: ALTCHOICE

## 2017-07-07 RX ORDER — SODIUM CHLORIDE 9 MG/ML
3 INJECTION, SOLUTION INTRAVENOUS CONTINUOUS
Status: DISPENSED | OUTPATIENT
Start: 2017-07-07 | End: 2017-07-07

## 2017-07-07 RX ORDER — HEPARIN SODIUM 1000 [USP'U]/ML
5000 INJECTION, SOLUTION INTRAVENOUS; SUBCUTANEOUS
Status: DISCONTINUED | OUTPATIENT
Start: 2017-07-07 | End: 2017-07-07 | Stop reason: ALTCHOICE

## 2017-07-07 RX ORDER — LIDOCAINE HYDROCHLORIDE 10 MG/ML
4-30 INJECTION INFILTRATION; PERINEURAL
Status: DISCONTINUED | OUTPATIENT
Start: 2017-07-07 | End: 2017-07-07 | Stop reason: ALTCHOICE

## 2017-07-07 RX ORDER — MIDAZOLAM HYDROCHLORIDE 1 MG/ML
1-10 INJECTION, SOLUTION INTRAMUSCULAR; INTRAVENOUS
Status: DISCONTINUED | OUTPATIENT
Start: 2017-07-07 | End: 2017-07-07 | Stop reason: ALTCHOICE

## 2017-07-07 RX ADMIN — VERAPAMIL HYDROCHLORIDE 2.5 MG: 2.5 INJECTION, SOLUTION INTRAVENOUS at 09:21

## 2017-07-07 RX ADMIN — CARVEDILOL 6.25 MG: 6.25 TABLET, FILM COATED ORAL at 10:37

## 2017-07-07 RX ADMIN — FENTANYL CITRATE 25 MCG: 50 INJECTION, SOLUTION INTRAMUSCULAR; INTRAVENOUS at 09:07

## 2017-07-07 RX ADMIN — HYDRALAZINE HYDROCHLORIDE 20 MG: 20 INJECTION INTRAMUSCULAR; INTRAVENOUS at 10:22

## 2017-07-07 RX ADMIN — FENTANYL CITRATE 25 MCG: 50 INJECTION, SOLUTION INTRAMUSCULAR; INTRAVENOUS at 09:05

## 2017-07-07 RX ADMIN — IOPAMIDOL 106 ML: 755 INJECTION, SOLUTION INTRAVENOUS at 09:47

## 2017-07-07 RX ADMIN — Medication 10 ML: at 09:00

## 2017-07-07 RX ADMIN — HEPARIN SODIUM 2500 UNITS: 1000 INJECTION, SOLUTION INTRAVENOUS; SUBCUTANEOUS at 09:21

## 2017-07-07 RX ADMIN — FENTANYL CITRATE 50 MCG: 50 INJECTION, SOLUTION INTRAMUSCULAR; INTRAVENOUS at 09:15

## 2017-07-07 RX ADMIN — HEPARIN SODIUM IN SODIUM CHLORIDE 2000 UNITS: 200 INJECTION INTRAVENOUS at 09:00

## 2017-07-07 RX ADMIN — MIDAZOLAM HYDROCHLORIDE 2 MG: 1 INJECTION, SOLUTION INTRAMUSCULAR; INTRAVENOUS at 09:07

## 2017-07-07 RX ADMIN — HYDROCHLOROTHIAZIDE: 25 TABLET ORAL at 10:40

## 2017-07-07 RX ADMIN — MIDAZOLAM HYDROCHLORIDE 2 MG: 1 INJECTION, SOLUTION INTRAMUSCULAR; INTRAVENOUS at 09:00

## 2017-07-07 RX ADMIN — LIDOCAINE HYDROCHLORIDE 4 ML: 10 INJECTION, SOLUTION INFILTRATION; PERINEURAL at 09:06

## 2017-07-07 RX ADMIN — NITROGLYCERIN 200 MCG: 5 INJECTION, SOLUTION INTRAVENOUS at 09:21

## 2017-07-07 RX ADMIN — MIDAZOLAM HYDROCHLORIDE 1 MG: 1 INJECTION, SOLUTION INTRAMUSCULAR; INTRAVENOUS at 09:05

## 2017-07-07 RX ADMIN — SODIUM CHLORIDE 3 ML/KG/HR: 900 INJECTION, SOLUTION INTRAVENOUS at 08:37

## 2017-07-07 RX ADMIN — SODIUM CHLORIDE 1.5 ML/KG/HR: 900 INJECTION, SOLUTION INTRAVENOUS at 09:33

## 2017-07-07 RX ADMIN — FENTANYL CITRATE 50 MCG: 50 INJECTION, SOLUTION INTRAMUSCULAR; INTRAVENOUS at 09:00

## 2017-07-07 RX ADMIN — MIDAZOLAM HYDROCHLORIDE 1 MG: 1 INJECTION, SOLUTION INTRAMUSCULAR; INTRAVENOUS at 09:15

## 2017-07-07 NOTE — PROGRESS NOTES
1139:  3 ml of air removed out of right radial TR band. No bleeding and no hematoma. Will continue to monitor.

## 2017-07-07 NOTE — PHYSICIAN ADVISORY
Letter of Status Determination:   Recommend hospitalization status upgraded from   Observation to Inpatient  Status     Pt Name:  Shahid Francis   MR#  [id number]   [facility]#   [id number]   Room and Hospital  CCL/PL  @ [facility]. [facility]   Hospitalization date  7/5/2017  8:07 AM   Current Attending Physician  Miguelangel Jarrell MD   Principal diagnosis  <principal problem not specified>   Uncontrolled Hypertension, chest pain    Clinicals  33 y.o. y.o  male hospitalized with above diagnosis   The pt is noted to have run out of her ANTI HTN meds and presented with chest pain and elevated blood pressure. His workup showed elevated troponin and stress test is being read mildly abnormal and low EF. EKG on admission shows possible Left atrial enlargement, Premature atrial complex, abnormal ST-T changes. These all favor the diagnosis of NSTEMI (Type 2MI)      Milliman (MCG) criteria   Does  NOT apply    STATUS DETERMINATION  Based on documented presenting clinical data, comorbid conditions, high risk of adverse events and deterioration, it is our recommendation that the patient's status should be upgraded from OBSERVATION to INPATIENT status. The final decision of the patient's hospitalization status depends on the attending physician's judgment.          Additional comments     Payor: CECILIO / [facility] / Product Type: Osmani Johnson /         Malaika Childs MD MPH FACP     Physician Advisor    [address]   [facility] [employer], [address] [place] [address]    Cell  [phone]

## 2017-07-07 NOTE — PROGRESS NOTES
I have reviewed discharge instructions with the patient. The patient and friend verbalized understanding.

## 2017-07-07 NOTE — DISCHARGE INSTRUCTIONS
Radial Cardiac Catheterization / Angiography Discharge Instructions    It is normal to feel tired the first couple days. Take it easy and follow the physicians instructions. CHECK THE CATHETER INSERTION SITE DAILY:  Remove the wrist dressing 24 hours after the procedure. You may shower 24 hours after the procedure. Wash with soap and water and pat dry. Gentle cleaning of the site with soap and water is sufficient, cover with a dry clean dressing or bandage. Do not apply creams or powders to the area. No soaking the wrist for 3 days. Leave the puncture site open to air after 24 hours post-procedure. CALL THE PHYSICIAN:  If the site becomes red, swollen or feels warm to the touch. If there is bleeding or drainage or if there is unusual pain at the radial site. If there is any minor oozing, you may apply a band-aid and remove after 12 hours. If the bleeding continues, hold pressure with the middle finger against the puncture site and the thumb against the back of the wrist, call 911 to be transported to the hospital.  DO NOT DRIVE YOURSELF, [address]. ACTIVITY:  For the first 24 hours do not manipulate the wrist.  No lifting, pushing or pulling over 3-5 pounds with the affected wrist for 7 days and no straining the insertion site. Do not lift grocery bags or the garbage can, do not run the vacuum  or  for 7 days. Start with short walks as in the hospital and gradually increase as tolerated each day. It is recommended to walk 30 minutes 5-7 days per week. Follow your physicians instructions on activity. Avoid walking outside in extremes of heat or cold. Walk inside when it is cold and windy or hot and humid. THINGS TO KEEP IN MIND:  No driving for at least 24 hours, or as designated by your physician. Limit the number of times you go up and down the stairs  Take rests and pace yourself with activity.   Be careful and do not strain with bowel movements. MEDICATIONS:  Take all medications as prescribed. Call your physician if you have any questions. Keep an updated list of your medications with you at all times and give a list to your physician and pharmacist.    SIGNS AND SYMPTOMS:  Be cautions of symptoms of angina or recurrent symptoms such as chest discomfort, unusual shortness of breath or fatigue. These could be symptoms of restenosis, a new blockage or a heart attack. If your symptoms are relieved with rest it is still recommended that you notify your physician of recurrent chest pain or discomfort. For CHEST PAIN or symptoms of angina not relieved with rest:  If the discomfort is not relieved with rest and you have been prescribed Nitroglycerin, take as directed (taken under the tongue, one at a time 5 minutes apart for a total of 3 doses). If the discomfort is not relieved after the 3rd nitroglycerin, call 911. AFTER CARE:  Follow up with you physician as instructed. Follow a heart healthy diet with proper portion control, daily stress management, daily      exercise, blood pressure and cholesterol control, and smoking cessation.

## 2017-07-07 NOTE — PROGRESS NOTES
TRANSFER - IN REPORT:    Verbal report received from [facility] on Markus Hobbs, Procedure LHC , from the Cardiac Cath lab, for routine progression of care. Report consisted of patients Situation, Background, Assessment and Recommendations(SBAR). Information from the following report(s) SBAR, MAR and Recent Results was reviewed with the receiving clinician. Opportunity for questions and clarification was provided. Assessment completed upon patients arrival to [address] [place] [address] and care assumed. Cardiac Cath Lab Recovery Arrival Note:     Markus Hobbs arrived to [facility] recovery area. Patient procedure= LHC. Patient on cardiac monitor, non-invasive blood pressure, Patient status doing well without problems. Patient is A&Ox 4  . Patient reports No Pain. Procedure site without any bleeding and No hematoma.

## 2017-07-07 NOTE — PROGRESS NOTES
Cath showed normal coronary arteries. Can be discharged after usual radial cath recovery. Please have him follow up with his PCP next week. No need for follow up with me.   Thank you  Vivek Fitzpatrick MD

## 2017-07-07 NOTE — PROGRESS NOTES
TRANSFER - OUT REPORT:    Verbal report given to [facility] RN on Pleas Pacer being transferred to [address] [place] [address] Dr Wu for routine progression of care       Report consisted of patients Situation, Background, Assessment and   Recommendations(SBAR). Information from the following report(s) SBAR, Kardex, [facility] and Recent Results was reviewed with the receiving nurse. Opportunity for questions and clarification was provided.

## 2017-07-07 NOTE — DISCHARGE SUMMARY
Discharge Summary       PATIENT ID: Kim Kang  MRN: [id number]   YOB: 1985    DATE OF ADMISSION: 7/5/2017  8:07 AM    DATE OF DISCHARGE: 7/7/2017  PRIMARY CARE PROVIDER: Linus Roque MD     ATTENDING PHYSICIAN: Kristina Mitchell  DISCHARGING PROVIDER: Tabitha Dawn NP    To contact this individual call [phone] and ask the  to page. If unavailable ask to be transferred the Adult Hospitalist Department. CONSULTATIONS: IP CONSULT TO CARDIOLOGY    PROCEDURES/SURGERIES: * No surgery found *    ADMITTING DIAGNOSES & HOSPITAL COURSE:   Dx: Chest pain, Hypertension    33 y. o. male who presents with PMHx of HTN, off meds for 2 months as his PCP was retiring and could not find another. Pt presented to ER today due to chest pain on/off for 2 weeks intermittent, slightly pressures, with numbness and tingling of both hands. 7/6 Stress test showed Study limited by patient's body habitus. There is mild thinning of the inferior wall slightly more apparent at stress than rest may related to attenuation artifact although small focus of ischemia cannot entirely be excluded. . There is no definite evidence of infarction. . Left ventricular ejection fraction is 41 %. 7/7 Cardiac cath with Dr Gil Hodge showed normal coronary arteries. Chest pain now resolved. BP improved. Patient stable for discharge home.      DISCHARGE DIAGNOSES / PLAN:      Chest pain  -troponin mildly elevated   -stress test abnormal, heart cath normal  -Dr Gil Hodge with cardiology following, ok for dc and follow up with pcp  -baby ASA     Hypertensive Urgency  -BP now improved   -elevated on admission due to noncompliance, has not taken home BP d/t not having pcp   -continue Clonidine BID and Lisinopril/HCTZ       Morbid Obesity   -educated on diet and exercise       PENDING TEST RESULTS:   At the time of discharge the following test results are still pending: none    FOLLOW UP APPOINTMENTS:    Follow-up Information     Follow up With Details Comments 100 Brian Morrissey MD On 7/18/2017 9:00AM appointment, New PCP [address] [place] [address]  [address] [address]  [phone]             ADDITIONAL CARE RECOMMENDATIONS:   1. We have prescribed you the following new medications:  -Baby Aspirin for heart health  -Clonidine for high blood pressure. See below for more information to include common side effects. 2. Make sure to restart taking your Lisinopril/Hydrochlorothiazide for high blood pressure. 3. Recommending increasing exercise and diet changes to assist in weight loss. 4. Follow up with your new primary care doctor as listed above. DIET: Cardiac, low fat    ACTIVITY: as tolerated    WOUND CARE: keep radial site clean and dry. You can keep a bandaid until healed    EQUIPMENT needed: none      DISCHARGE MEDICATIONS:  Current Discharge Medication List      START taking these medications    Details   aspirin delayed-release 81 mg tablet Take 1 Tab by mouth daily. Qty: 30 Tab, Refills: 0      cloNIDine HCl (CATAPRES) 0.1 mg tablet Take 1 Tab by mouth two (2) times a day for 30 days. Indications: hypertension  Qty: 60 Tab, Refills: 0         CONTINUE these medications which have CHANGED    Details   lisinopril-hydroCHLOROthiazide (PRINZIDE, ZESTORETIC) 20-25 mg per tablet Take 1 Tab by mouth daily. Qty: 30 Tab, Refills: 0               NOTIFY YOUR PHYSICIAN FOR ANY OF THE FOLLOWING:   Fever over 101 degrees for 24 hours. Chest pain, shortness of breath, fever, chills, nausea, vomiting, diarrhea, change in mentation, falling, weakness, bleeding. Severe pain or pain not relieved by medications. Or, any other signs or symptoms that you may have questions about.     DISPOSITION:  X  Home With:   OT  PT  HH  RN       Long term SNF/Inpatient Rehab    Independent/assisted living    Hospice    Other:       PATIENT CONDITION AT DISCHARGE:     Functional status    Poor     Deconditioned    X Independent Cognition    X Lucid     Forgetful     Dementia      Catheters/lines (plus indication)    Taveras     PICC     PEG    X None      Code status   X  Full code     DNR      PHYSICAL EXAMINATION AT DISCHARGE:  Constitutional:  No acute distress, cooperative, pleasant    ENT:  Oral mucous moist, oropharynx benign. Neck supple,    Resp:  CTA bilaterally. No wheezing/rhonchi/rales. No accessory muscle use   CV:  Regular rhythm, normal rate, no murmurs, gallops, rubs    GI:  Soft, non distended, non tender. normoactive bowel sounds, no hepatosplenomegaly     Musculoskeletal:  No edema, warm, 2+ pulses throughout    Neurologic:  Moves all extremities. AAOx3, CN II-XII reviewed                                             Psych:  Good insight, Not anxious nor agitated.         CHRONIC MEDICAL DIAGNOSES:  Problem List as of 7/7/2017  Date Reviewed: 1/20/2016          Codes Class Noted - Resolved    Chest pain ICD-10-CM: R07.9  ICD-9-CM: 786.50  7/5/2017 - Present        Uncontrolled hypertension ICD-10-CM: I10  ICD-9-CM: 401.9  7/5/2017 - Present              Greater than 40 minutes were spent with the patient on counseling and coordination of care    Signed:   Rosie Meredith NP  7/7/2017  11:58 AM

## 2017-07-07 NOTE — PROGRESS NOTES
Patient is sitting on side of the bed family/friends at the bedside. She denies having any chest pain, SOB, or discomfort at this time. Will continue to monitor patient for changes in her condition. 0000 patient resting in bed no complaints at this time. Consent for cardiac cath has been done. 0400 patient resting in bed no complaints at this time blood drawn and sent to lab. Patient reminded she is NPO until after her procedure. 3835 patient resting in bed no complaints at this time. 0730 Bedside and Verbal shift change report given to Nell Quiroz RN  (oncoming nurse) by Jumana Perez RN  (offgoing nurse). Report included the following information SBAR, MAR, Recent Results and Med Rec Status.

## 2017-07-07 NOTE — PROGRESS NOTES
Cardiac Cath Lab Recovery Arrival Note:      Delicia Ahumada arrived to Cardiac Cath Lab, Recovery Area. Staff introduced to patient. Patient identifiers verified with NAME and DATE OF BIRTH. Procedure verified with patient. Consent forms reviewed and signed by patient or authorized representative and verified. Allergies verified. Patient informed of procedure and plan of care. Questions answered with review. Patient prepped for procedure, per orders from physician, prior to arrival.    Patient on cardiac monitor, non-invasive blood pressure, SPO2 monitor. Patient is A&Ox 4. Patient reports No Pain. Patient in stretcher, in low position, with side rails up, call bell within reach, patient instructed to call of assistance as needed. Patient prep in: [facility] Recovery Area, Bed# 6. Family in: Waiting Room.    Prep by:Britney Dover made aware of Pt's elevated [id number]

## 2017-07-07 NOTE — PROGRESS NOTES
[address] July 7, 2017       RE: Malachi Burciaga      To Whom It May Concern,    This is to certify that Malachi Burciaga was in the hospital from 7/5/2017 to 7/7/2017. She may return to work on Sunday 7/9/2017 however patient will be restricted to not lifting greater than 10 pounds. She can return to full duty with no restrictions Friday 7/14/2017. Please feel free to contact my office if you have any questions or concerns. Thank you for your assistance in this matter.       Sincerely,        Keo Espino NP  [phone]

## 2017-07-07 NOTE — PROGRESS NOTES
Cardiac Cath Lab Procedure Area Arrival Note:    Brianne Ruiz arrived to Cardiac Cath Lab, Procedure Area. Patient identifiers verified with NAME and DATE OF BIRTH. Procedure verified with patient. Consent forms verified. Allergies verified. Patient informed of procedure and plan of care. Questions answered with review. Patient voiced understanding of procedure and plan of care. Patient on cardiac monitor, non-invasive blood pressure, SPO2 monitor. On O2 @ 2 lpm via NC.  IV of NS on pump at 482 ml/hr. Patient status doing well without problems. Patient is A&Ox 4. Patient reports pain. Patient medicated during procedure with orders obtained and verified by Dr. Umesh New. Refer to patients Cardiac Cath Lab PROCEDURE REPORT for vital signs, assessment, status, and response during procedure, printed at end of case. Printed report on chart or scanned into chart. TRANSFER - OUT REPORT:    Verbal report given to EZEKIEL Cortés on [facility] being transferred to cath lab recovery for routine progression of care       Report consisted of patients Situation, Background, Assessment and   Recommendations(SBAR). Information from the following report(s) Procedure Summary was reviewed with the receiving nurse. Opportunity for questions and clarification was provided.

## 2017-07-07 NOTE — PROGRESS NOTES
1100: 2 ml air removed from TR band; No Bleeding or Hematoma.   1115: 3ml  Of air removed from TR Band

## 2017-08-11 ENCOUNTER — OFFICE VISIT (OUTPATIENT)
Dept: INTERNAL MEDICINE CLINIC | Age: 32
End: 2017-08-11

## 2017-08-11 VITALS
BODY MASS INDEX: 44.1 KG/M2 | DIASTOLIC BLOOD PRESSURE: 108 MMHG | HEART RATE: 75 BPM | SYSTOLIC BLOOD PRESSURE: 148 MMHG | HEIGHT: 71 IN | TEMPERATURE: 98.5 F | RESPIRATION RATE: 20 BRPM | WEIGHT: 315 LBS

## 2017-08-11 DIAGNOSIS — I10 UNCONTROLLED HYPERTENSION: Primary | ICD-10-CM

## 2017-08-11 DIAGNOSIS — E66.01 MORBID OBESITY DUE TO EXCESS CALORIES (HCC): ICD-10-CM

## 2017-08-11 DIAGNOSIS — R73.02 GLUCOSE INTOLERANCE (IMPAIRED GLUCOSE TOLERANCE): ICD-10-CM

## 2017-08-11 RX ORDER — AMLODIPINE BESYLATE 5 MG/1
5 TABLET ORAL DAILY
Qty: 90 TAB | Refills: 3 | Status: SHIPPED | OUTPATIENT
Start: 2017-08-11 | End: 2018-09-10 | Stop reason: SDUPTHER

## 2017-08-11 RX ORDER — LISINOPRIL AND HYDROCHLOROTHIAZIDE 20; 25 MG/1; MG/1
1 TABLET ORAL DAILY
Qty: 90 TAB | Refills: 3 | Status: SHIPPED | OUTPATIENT
Start: 2017-08-11 | End: 2018-09-10 | Stop reason: SDUPTHER

## 2017-08-11 NOTE — PROGRESS NOTES
1. Have you been to the ER, urgent care clinic since your last visit? Hospitalized since your last visit? Yes When: 7/5/17 [facility] cheat pains/hypertension. 2. Have you seen or consulted any other health care providers outside of the [address] [place] [address] since your last visit? Include any pap smears or colon screening.  No.

## 2017-08-11 NOTE — PATIENT INSTRUCTIONS

## 2017-08-11 NOTE — PROGRESS NOTES
Jenise Daniel is a 33 y.o. male and presents with Hypertension; Follow-up; and Medication Refill  . Subjective:    Pt has a 32 yo male transitioning from male to female. She has a  PMH HTN and non compliance w medication. Pt was admitted to hospital 7/5/17 for chest pain and uncontrolled bp. Pt was eventually cathed which showed nml coronary arteries. Pt had an appointment w me in July, but missed it. Pt relays she has not been taking her bp meds regularly bc she has been running out. Pt relays she has a h/o depression and has been tried on sertraline and paroxetine, but stopped due to wt gain. She would like an antidepressant that does not cause wt gain. Review of Systems  Constitutional: negative for fevers, chills, anorexia and weight loss  Respiratory:  negative for cough, hemoptysis, dyspnea,wheezing  CV:   negative for chest pain, palpitations, lower extremity edema  GI:   negative for nausea, vomiting, diarrhea, abdominal pain,melena  Endo:               negative for polyuria,polydipsia,polyphagia,heat intolerance  Musculoskel: negative for myalgias, arthralgias, back pain, muscle weakness, joint pain  Neurological:  negative for headaches, dizziness, vertigo, memory problems and gait   Behavl/Psych: positive for feelings of anxiety, depression, mood changes denies SI    Past Medical History:   Diagnosis Date    Depression     Hypertension      History reviewed. No pertinent surgical history.   Social History     Social History    Marital status: SINGLE     Spouse name: N/A    Number of children: N/A    Years of education: N/A     Social History Main Topics    Smoking status: Light Tobacco Smoker     Types: Cigarettes    Smokeless tobacco: Never Used    Alcohol use Yes      Comment: socially    Drug use: No    Sexual activity: Not Currently     Other Topics Concern    None     Social History Narrative     Family History   Problem Relation Age of Onset    Hypertension Mother    [address] [facility] Hypertension Father      Current Outpatient Prescriptions   Medication Sig Dispense Refill    amLODIPine (NORVASC) 5 mg tablet Take 1 Tab by mouth daily. 90 Tab 3    lisinopril-hydroCHLOROthiazide (PRINZIDE, ZESTORETIC) 20-25 mg per tablet Take 1 Tab by mouth daily. 90 Tab 3    aspirin delayed-release 81 mg tablet Take 1 Tab by mouth daily. 30 Tab 0     No Known Allergies    Objective:  Visit Vitals    BP (!) 148/108 (BP 1 Location: Right arm, BP Patient Position: Sitting)    Pulse 75    Temp 98.5 °F (36.9 °C) (Oral)    Resp 20    Ht 5' 11\" (1.803 m)    Wt (!) 356 lb (161.5 kg)    BMI 49.65 kg/m2     Physical Exam:   General appearance - alert, well appearing, and in no distress obeseMental status - alert, oriented to person, place, and time  EYE-DEVANTE, EOMI, corneas normal, no foreign bodies  Neck - supple, no significant adenopathy   Chest - clear to auscultation, no wheezes, rales or rhonchi, symmetric air entry   Heart - normal rate, regular rhythm, normal S1, S2, no murmurs, rubs, clicks or gallops   Abdomen - obese  Ext-peripheral pulses normal, no pedal edema, no clubbing or cyanosis  Skin-Warm and dry. no hyperpigmentation, vitiligo, or suspicious lesions  Neuro -alert, oriented, normal speech, no focal findings or movement disorder noted  Neck-normal C-spine, no tenderness, full ROM without pain        Results for orders placed or performed during the hospital encounter of 07/05/17   CBC WITH AUTOMATED DIFF   Result Value Ref Range    WBC 5.9 4.1 - 11.1 K/uL    RBC 5.20 4. 10 - 5.70 M/uL    HGB 15.3 12.1 - 17.0 g/dL    HCT 44.9 36.6 - 50.3 %    MCV 86.3 80.0 - 99.0 FL    MCH 29.4 26.0 - 34.0 PG    MCHC 34.1 30.0 - 36.5 g/dL    RDW 12.7 11.5 - 14.5 %    PLATELET 322 467 - 804 K/uL    NEUTROPHILS 38 32 - 75 %    LYMPHOCYTES 49 12 - 49 %    MONOCYTES 9 5 - 13 %    EOSINOPHILS 4 0 - 7 %    BASOPHILS 0 0 - 1 %    ABS. NEUTROPHILS 2.2 1.8 - 8.0 K/UL    ABS. LYMPHOCYTES 2.9 0.8 - 3.5 K/UL    ABS. MONOCYTES 0.5 0.0 - 1.0 K/UL    ABS. EOSINOPHILS 0.2 0.0 - 0.4 K/UL    ABS. BASOPHILS 0.0 0.0 - 0.1 K/UL   METABOLIC PANEL, COMPREHENSIVE   Result Value Ref Range    Sodium 140 136 - 145 mmol/L    Potassium 3.1 (L) 3.5 - 5.1 mmol/L    Chloride 102 97 - 108 mmol/L    CO2 32 21 - 32 mmol/L    Anion gap 6 5 - 15 mmol/L    Glucose 144 (H) 65 - 100 mg/dL    BUN 9 6 - 20 MG/DL    Creatinine 0.89 0.70 - 1.30 MG/DL    BUN/Creatinine ratio 10 (L) 12 - 20      GFR est AA >60 >60 ml/min/1.73m2    GFR est non-AA >60 >60 ml/min/1.73m2    Calcium 8.3 (L) 8.5 - 10.1 MG/DL    Bilirubin, total 0.5 0.2 - 1.0 MG/DL    ALT (SGPT) 48 12 - 78 U/L    AST (SGOT) 36 15 - 37 U/L    Alk. phosphatase 52 45 - 117 U/L    Protein, total 6.9 6.4 - 8.2 g/dL    Albumin 3.2 (L) 3.5 - 5.0 g/dL    Globulin 3.7 2.0 - 4.0 g/dL    A-G Ratio 0.9 (L) 1.1 - 2.2     CK W/ REFLX CKMB   Result Value Ref Range     (H) 39 - 308 U/L   TROPONIN I   Result Value Ref Range    Troponin-I, Qt. 0.08 (H) <0.05 ng/mL   CK-MB,QUANT.    Result Value Ref Range    CK - MB 3.7 (H) <3.6 NG/ML    CK-MB Index 0.5 0 - 2.5     DRUG SCREEN, URINE   Result Value Ref Range    AMPHETAMINES NEGATIVE  NEG      BARBITURATES NEGATIVE  NEG      BENZODIAZEPINE NEGATIVE  NEG      COCAINE NEGATIVE  NEG      METHADONE NEGATIVE  NEG      OPIATES NEGATIVE  NEG      PCP(PHENCYCLIDINE) NEGATIVE  NEG      THC (TH-CANNABINOL) POSITIVE (A) NEG      Drug screen comment (NOTE)    URINALYSIS W/ REFLEX CULTURE   Result Value Ref Range    Color YELLOW/STRAW      Appearance CLEAR CLEAR      Specific gravity 1.011 1.003 - 1.030      pH (UA) 7.0 5.0 - 8.0      Protein NEGATIVE  NEG mg/dL    Glucose NEGATIVE  NEG mg/dL    Ketone NEGATIVE  NEG mg/dL    Bilirubin NEGATIVE  NEG      Blood NEGATIVE  NEG      Urobilinogen 0.2 0.2 - 1.0 EU/dL    Nitrites NEGATIVE  NEG      Leukocyte Esterase NEGATIVE  NEG      WBC 0-4 0 - 4 /hpf    RBC 0-5 0 - 5 /hpf    Epithelial cells FEW FEW /lpf    Bacteria NEGATIVE  NEG /hpf    UA:UC IF INDICATED CULTURE NOT INDICATED BY UA RESULT CNI      Hyaline cast 0-2 0 - 5 /lpf   CK W/ REFLX CKMB   Result Value Ref Range     (H) 39 - 308 U/L   CK   Result Value Ref Range     (H) 39 - 308 U/L   TROPONIN I   Result Value Ref Range    Troponin-I, Qt. 0.07 (H) <0.05 ng/mL   TROPONIN I   Result Value Ref Range    Troponin-I, Qt. 0.07 (H) <0.05 ng/mL   CK-MB,QUANT.    Result Value Ref Range    CK - MB 3.1 <3.6 NG/ML    CK-MB Index 0.5 0 - 2.5     METABOLIC PANEL, BASIC   Result Value Ref Range    Sodium 138 136 - 145 mmol/L    Potassium 3.5 3.5 - 5.1 mmol/L    Chloride 99 97 - 108 mmol/L    CO2 30 21 - 32 mmol/L    Anion gap 9 5 - 15 mmol/L    Glucose 96 65 - 100 mg/dL    BUN 9 6 - 20 MG/DL    Creatinine 0.80 0.70 - 1.30 MG/DL    BUN/Creatinine ratio 11 (L) 12 - 20      GFR est AA >60 >60 ml/min/1.73m2    GFR est non-AA >60 >60 ml/min/1.73m2    Calcium 8.3 (L) 8.5 - 10.1 MG/DL   MAGNESIUM   Result Value Ref Range    Magnesium 2.1 1.6 - 2.4 mg/dL   HEMOGLOBIN A1C WITH EAG   Result Value Ref Range    Hemoglobin A1c 5.9 4.2 - 6.3 %    Est. average glucose 123 mg/dL   LIPID PANEL   Result Value Ref Range    LIPID PROFILE          Cholesterol, total 194 <200 MG/DL    Triglyceride 132 <150 MG/DL    HDL Cholesterol 49 MG/DL    LDL, calculated 118.6 (H) 0 - 100 MG/DL    VLDL, calculated 26.4 MG/DL    CHOL/HDL Ratio 4.0 0 - 5.0     METABOLIC PANEL, BASIC   Result Value Ref Range    Sodium 137 136 - 145 mmol/L    Potassium 3.5 3.5 - 5.1 mmol/L    Chloride 99 97 - 108 mmol/L    CO2 29 21 - 32 mmol/L    Anion gap 9 5 - 15 mmol/L    Glucose 108 (H) 65 - 100 mg/dL    BUN 14 6 - 20 MG/DL    Creatinine 0.87 0.70 - 1.30 MG/DL    BUN/Creatinine ratio 16 12 - 20      GFR est AA >60 >60 ml/min/1.73m2    GFR est non-AA >60 >60 ml/min/1.73m2    Calcium 8.3 (L) 8.5 - 10.1 MG/DL   EKG, 12 LEAD, INITIAL   Result Value Ref Range    Ventricular Rate 77 BPM    Atrial Rate 77 BPM    P-R Interval 164 ms    QRS Duration 104 ms    Q-T Interval 414 ms    QTC Calculation (Bezet) 468 ms    Calculated P Axis 41 degrees    Calculated R Axis 15 degrees    Calculated T Axis -142 degrees    Diagnosis       Sinus rhythm with premature atrial complexes  ST & T wave abnormality, consider inferolateral ischemia  Prolonged QT  When compared with ECG of 12-MAY-2015 18:00,  No significant change    Confirmed by Mulu Guardado M.D., Adarsh Gillespie ([id number]) on 7/5/2017 10:43:39 AM     EKG, 12 LEAD, INITIAL   Result Value Ref Range    Ventricular Rate 90 BPM    Atrial Rate 90 BPM    P-R Interval 156 ms    QRS Duration 90 ms    Q-T Interval 402 ms    QTC Calculation (Bezet) 491 ms    Calculated P Axis 54 degrees    Calculated R Axis 44 degrees    Calculated T Axis -137 degrees    Diagnosis       Sinus rhythm with premature atrial complexes  T wave abnormality, consider inferolateral ischemia (cited on or before    When compared with ECG of 05-JUL-2017 08:32,  No significant change was found  Confirmed by Ramonita Broussard M.D., Domonique Silverio ([id number]) on 7/6/2017 8:20:37 AM     NUCLEAR STRESS TEST   Result Value Ref Range    Diagnosis         Confirmed by Abigail Maldonado MD, Rama Jonas ([id number]),  Harinder Ford ([id number])   on 7/6/2017 8:52:05 AM      Test indication Chest Discomfort     Functional capacity Could Not Be Adequately Assessed     ECG Interp. Before Exercise non-specific ST/T abnormality     ECG Interp. During Exercise nondiagnostic secondary to resting abnormalities     Overall HR response to exercise could not be adequately assessed     Overall BP response to exercise resting hypertension - exaggerated response     Max. Systolic  mmHg    Max. Diastolic  mmHg    Max. Heart rate 104 BPM    [facility] treadmill score      Vázquez TM score result      Peak Ex METs 1.0 METS    Protocol name [facility]campo cardiac condition      Attending physician MD SILVANA        Assessment/Plan:    ICD-10-CM ICD-9-CM    1.  Uncontrolled hypertension I10 401.9 amLODIPine (NORVASC) 5 mg tablet      lisinopril-hydroCHLOROthiazide (PRINZIDE, ZESTORETIC) 20-25 mg per tablet   2. Glucose intolerance (impaired glucose tolerance) R73.02 790.22    3. Morbid obesity due to excess calories (HCC) E66.01 278.01      Orders Placed This Encounter    amLODIPine (NORVASC) 5 mg tablet     Sig: Take 1 Tab by mouth daily. Dispense:  90 Tab     Refill:  3    lisinopril-hydroCHLOROthiazide (PRINZIDE, ZESTORETIC) 20-25 mg per tablet     Sig: Take 1 Tab by mouth daily. Dispense:  90 Tab     Refill:  3   refill regimen pt was previously on  DO NOT RESTART CLONIDINE  lose weight, increase physical activity, follow low salt diet,Take 81mg aspirin daily  F/u 6 weeks bp check on regimen  Refer to psych [facility]  T/c referral to wt loss center here @ [facility]  Patient Instructions        Low Sodium Diet (2,000 Milligram): Care Instructions  Your Care Instructions  Too much sodium causes your body to hold on to extra water. This can raise your blood pressure and force your heart and kidneys to work harder. In very serious cases, this could cause you to be put in the hospital. It might even be life-threatening. By limiting sodium, you will feel better and lower your risk of serious problems. The most common source of sodium is salt. People get most of the salt in their diet from canned, prepared, and packaged foods. Fast food and restaurant meals also are very high in sodium. Your doctor will probably limit your sodium to less than 2,000 milligrams (mg) a day. This limit counts all the sodium in prepared and packaged foods and any salt you add to your food. Follow-up care is a key part of your treatment and safety. Be sure to make and go to all appointments, and call your doctor if you are having problems. It's also a good idea to know your test results and keep a list of the medicines you take. How can you care for yourself at home? Read food labels  · Read labels on cans and food packages.  The labels tell you how much sodium is in each serving. Make sure that you look at the serving size. If you eat more than the serving size, you have eaten more sodium. · Food labels also tell you the Percent Daily Value for sodium. Choose products with low Percent Daily Values for sodium. · Be aware that sodium can come in forms other than salt, including monosodium glutamate (MSG), sodium citrate, and sodium bicarbonate (baking soda). MSG is often added to Asian food. When you eat out, you can sometimes ask for food without MSG or added salt. Buy low-sodium foods  · Buy foods that are labeled \"unsalted\" (no salt added), \"sodium-free\" (less than 5 mg of sodium per serving), or \"low-sodium\" (less than 140 mg of sodium per serving). Foods labeled \"reduced-sodium\" and \"light sodium\" may still have too much sodium. Be sure to read the label to see how much sodium you are getting. · Buy fresh vegetables, or frozen vegetables without added sauces. Buy low-sodium versions of canned vegetables, soups, and other canned goods. Prepare low-sodium meals  · Cut back on the amount of salt you use in cooking. This will help you adjust to the taste. Do not add salt after cooking. One teaspoon of salt has about 2,300 mg of sodium. · Take the salt shaker off the table. · Flavor your food with garlic, lemon juice, onion, vinegar, herbs, and spices. Do not use soy sauce, lite soy sauce, steak sauce, onion salt, garlic salt, celery salt, mustard, or ketchup on your food. · Use low-sodium salad dressings, sauces, and ketchup. Or make your own salad dressings and sauces without adding salt. · Use less salt (or none) when recipes call for it. You can often use half the salt a recipe calls for without losing flavor. Other foods such as rice, pasta, and grains do not need added salt. · Rinse canned vegetables, and cook them in fresh water. This removes some--but not all--of the salt.   · Avoid water that is naturally high in sodium or that has been treated with water softeners, which add sodium. Call your local water company to find out the sodium content of your water supply. If you buy bottled water, read the label and choose a sodium-free brand. Avoid high-sodium foods  · Avoid eating:  ¨ Smoked, cured, salted, and canned meat, fish, and poultry. ¨ Ham, vázquez, hot dogs, and luncheon meats. ¨ Regular, hard, and processed cheese and regular peanut butter. ¨ Crackers with salted tops, and other salted snack foods such as pretzels, chips, and salted popcorn. ¨ Frozen prepared meals, unless labeled low-sodium. ¨ Canned and dried soups, broths, and bouillon, unless labeled sodium-free or low-sodium. ¨ Canned vegetables, unless labeled sodium-free or low-sodium. ¨ Ronold Vázquez fries, pizza, tacos, and other fast foods. ¨ Pickles, olives, ketchup, and other condiments, especially soy sauce, unless labeled sodium-free or low-sodium. Where can you learn more? Go to http://david-sanchez.info/. Enter M999 in the search box to learn more about \"Low Sodium Diet (2,000 Milligram): Care Instructions. \"  Current as of: July 26, 2016  Content Version: 11.3  © 1688-2709 [employer]. Care instructions adapted under license by [employer] (which disclaims liability or warranty for this information). If you have questions about a medical condition or this instruction, always ask your healthcare professional. [place] [address] any warranty or liability for your use of this information. Follow-up Disposition:  Return in about 6 weeks (around 9/22/2017). I have reviewed with the patient details of the assessment and plan and all questions were answered. Relevent patient education was performed. The most recent lab findings were reviewed with the patient. An After Visit Summary was printed and given to the patient.

## 2017-08-11 NOTE — MR AVS SNAPSHOT
Visit Information Date & Time Provider Department Dept. Phone Encounter #  
 8/11/2017 10:00 AM Nayeli Rome, [address] [id number] Follow-up Instructions Return in about 6 weeks (around 9/22/2017). Upcoming Health Maintenance Date Due Pneumococcal 19-64 Medium Risk (1 of 1 - PPSV23) 9/18/2002 DTaP/Tdap/Td series (1 - Tdap) 9/18/2004 INFLUENZA AGE 9 TO ADULT 8/1/2017 Allergies as of 8/11/2017  Review Complete On: 8/11/2017 By: Emily Matthews LPN No Known Allergies Current Immunizations  Reviewed on 1/20/2016 Name Date Influenza Vaccine Intradermal PF 1/20/2016, 10/31/2014 Not reviewed this visit You Were Diagnosed With   
  
 Codes Comments Uncontrolled hypertension    -  Primary ICD-10-CM: I10 
ICD-9-CM: 401.9 Vitals BP Pulse Temp Resp Height(growth percentile) Weight(growth percentile) (!) 148/108 (BP 1 Location: Right arm, BP Patient Position: Sitting) 75 98.5 °F (36.9 °C) (Oral) 20 5' 11\" (1.803 m) (!) 356 lb (161.5 kg) BMI Smoking Status 49.65 kg/m2 Light Tobacco Smoker Vitals History BMI and BSA Data Body Mass Index Body Surface Area  
 49.65 kg/m 2 2.84 m 2 Preferred Pharmacy Pharmacy Name Phone [address] [phone] Your Updated Medication List  
  
   
This list is accurate as of: 8/11/17 10:55 AM.  Always use your most recent med list. amLODIPine 5 mg tablet Commonly known as:  Erenest Abhijeet Take 1 Tab by mouth daily. aspirin delayed-release 81 mg tablet Take 1 Tab by mouth daily. lisinopril-hydroCHLOROthiazide 20-25 mg per tablet Commonly known as:  Omega  Take 1 Tab by mouth daily. Prescriptions Sent to Pharmacy Refills  
 amLODIPine (NORVASC) 5 mg tablet 3 Sig: Take 1 Tab by mouth daily. Class: Normal  
 Pharmacy: [place] [facility] [address], [address] [facility] Ph #: [phone] Route: Oral  
 lisinopril-hydroCHLOROthiazide (PRINZIDE, ZESTORETIC) 20-25 mg per tablet 3 Sig: Take 1 Tab by mouth daily. Class: Normal  
 Pharmacy: [facility] [address], [address] [address] Ph #: [phone] Route: Oral  
  
Follow-up Instructions Return in about 6 weeks (around 9/22/2017). Patient Instructions Low Sodium Diet (2,000 Milligram): Care Instructions Your Care Instructions Too much sodium causes your body to hold on to extra water. This can raise your blood pressure and force your heart and kidneys to work harder. In very serious cases, this could cause you to be put in the hospital. It might even be life-threatening. By limiting sodium, you will feel better and lower your risk of serious problems. The most common source of sodium is salt. People get most of the salt in their diet from canned, prepared, and packaged foods. Fast food and restaurant meals also are very high in sodium. Your doctor will probably limit your sodium to less than 2,000 milligrams (mg) a day. This limit counts all the sodium in prepared and packaged foods and any salt you add to your food. Follow-up care is a key part of your treatment and safety. Be sure to make and go to all appointments, and call your doctor if you are having problems. It's also a good idea to know your test results and keep a list of the medicines you take. How can you care for yourself at home? Read food labels · Read labels on cans and food packages. The labels tell you how much sodium is in each serving. Make sure that you look at the serving size. If you eat more than the serving size, you have eaten more sodium. · Food labels also tell you the Percent Daily Value for sodium. Choose products with low Percent Daily Values for sodium. · Be aware that sodium can come in forms other than salt, including monosodium glutamate (MSG), sodium citrate, and sodium bicarbonate (baking soda). MSG is often added to Asian food. When you eat out, you can sometimes ask for food without MSG or added salt. Buy low-sodium foods · Buy foods that are labeled \"unsalted\" (no salt added), \"sodium-free\" (less than 5 mg of sodium per serving), or \"low-sodium\" (less than 140 mg of sodium per serving). Foods labeled \"reduced-sodium\" and \"light sodium\" may still have too much sodium. Be sure to read the label to see how much sodium you are getting. · Buy fresh vegetables, or frozen vegetables without added sauces. Buy low-sodium versions of canned vegetables, soups, and other canned goods. Prepare low-sodium meals · Cut back on the amount of salt you use in cooking. This will help you adjust to the taste. Do not add salt after cooking. One teaspoon of salt has about 2,300 mg of sodium. · Take the salt shaker off the table. · Flavor your food with garlic, lemon juice, onion, vinegar, herbs, and spices. Do not use soy sauce, lite soy sauce, steak sauce, onion salt, garlic salt, celery salt, mustard, or ketchup on your food. · Use low-sodium salad dressings, sauces, and ketchup. Or make your own salad dressings and sauces without adding salt. · Use less salt (or none) when recipes call for it. You can often use half the salt a recipe calls for without losing flavor. Other foods such as rice, pasta, and grains do not need added salt. · Rinse canned vegetables, and cook them in fresh water. This removes somebut not allof the salt. · Avoid water that is naturally high in sodium or that has been treated with water softeners, which add sodium. Call your local water company to find out the sodium content of your water supply. If you buy bottled water, read the label and choose a sodium-free brand. Avoid high-sodium foods · Avoid eating: ¨ Smoked, cured, salted, and canned meat, fish, and poultry. ¨ Ham, vázquez, hot dogs, and luncheon meats. ¨ Regular, hard, and processed cheese and regular peanut butter. ¨ Crackers with salted tops, and other salted snack foods such as pretzels, chips, and salted popcorn. ¨ Frozen prepared meals, unless labeled low-sodium. ¨ Canned and dried soups, broths, and bouillon, unless labeled sodium-free or low-sodium. ¨ Canned vegetables, unless labeled sodium-free or low-sodium. ¨ Western Jaki fries, pizza, tacos, and other fast foods. ¨ Pickles, olives, ketchup, and other condiments, especially soy sauce, unless labeled sodium-free or low-sodium. Where can you learn more? Go to http://david-sanchez.info/. Enter G861 in the search box to learn more about \"Low Sodium Diet (2,000 Milligram): Care Instructions. \" Current as of: July 26, 2016 Content Version: 11.3 © 2405-5662 [employer]. Care instructions adapted under license by [employer] (which disclaims liability or warranty for this information). If you have questions about a medical condition or this instruction, always ask your healthcare professional. [place] [address] any warranty or liability for your use of this information. Introducing \[facility]\"" & HEALTH SERVICES! Juanita Baltazar introduces Kinopto patient portal. Now you can access parts of your medical record, email your doctor's office, and request medication refills online. 1. In your internet browser, go to [email or web address] [email or web address]/Orchard Platform 2. Click on the First Time User? Click Here link in the Sign In box. You will see the New Member Sign Up page. 3. Enter your Kinopto Access Code exactly as it appears below. You will not need to use this code after youve completed the sign-up process. If you do not sign up before the expiration date, you must request a new code.  
 
· Kinopto Access Code: [email or web address] 
 Expires: 10/3/2017  3:50 PM 
 
4. Enter the last four digits of your Social Security Number (xxxx) and Date of Birth (mm/dd/yyyy) as indicated and click Submit. You will be taken to the next sign-up page. 5. Create a [employer] ID. This will be your [employer] login ID and cannot be changed, so think of one that is secure and easy to remember. 6. Create a [employer] password. You can change your password at any time. 7. Enter your Password Reset Question and Answer. This can be used at a later time if you forget your password. 8. Enter your e-mail address. You will receive e-mail notification when new information is available in [address] 9. Click Sign Up. You can now view and download portions of your medical record. 10. Click the Download Summary menu link to download a portable copy of your medical information. If you have questions, please visit the Frequently Asked Questions section of the [employer] website. Remember, [employer] is NOT to be used for urgent needs. For medical emergencies, dial 911. Now available from your iPhone and Android! Please provide this summary of care documentation to your next provider. Your primary care clinician is listed as Martín Quesada. If you have any questions after today's visit, please call [phone].

## 2018-09-09 ENCOUNTER — HOSPITAL ENCOUNTER (EMERGENCY)
Age: 33
Discharge: HOME | End: 2018-09-09
Attending: EMERGENCY MEDICINE
Payer: SELF-PAY

## 2018-09-09 ENCOUNTER — APPOINTMENT (OUTPATIENT)
Dept: CT IMAGING | Age: 33
End: 2018-09-09
Attending: PHYSICIAN ASSISTANT
Payer: SELF-PAY

## 2018-09-09 VITALS
HEART RATE: 88 BPM | BODY MASS INDEX: 44.1 KG/M2 | SYSTOLIC BLOOD PRESSURE: 153 MMHG | WEIGHT: 315 LBS | RESPIRATION RATE: 16 BRPM | HEIGHT: 71 IN | DIASTOLIC BLOOD PRESSURE: 97 MMHG | TEMPERATURE: 98.5 F | OXYGEN SATURATION: 99 %

## 2018-09-09 DIAGNOSIS — K11.21 PAROTITIS, ACUTE: Primary | ICD-10-CM

## 2018-09-09 LAB
ALBUMIN SERPL-MCNC: 3.2 G/DL (ref 3.5–5)
ALBUMIN/GLOB SERPL: 0.7 {RATIO} (ref 1.1–2.2)
ALP SERPL-CCNC: 55 U/L (ref 45–117)
ALT SERPL-CCNC: 44 U/L (ref 12–78)
ANION GAP SERPL CALC-SCNC: 10 MMOL/L (ref 5–15)
AST SERPL-CCNC: 33 U/L (ref 15–37)
BASOPHILS # BLD: 0 K/UL (ref 0–0.1)
BASOPHILS NFR BLD: 0 % (ref 0–1)
BILIRUB SERPL-MCNC: 0.7 MG/DL (ref 0.2–1)
BUN SERPL-MCNC: 11 MG/DL (ref 6–20)
BUN/CREAT SERPL: 10 (ref 12–20)
CALCIUM SERPL-MCNC: 8.1 MG/DL (ref 8.5–10.1)
CHLORIDE SERPL-SCNC: 100 MMOL/L (ref 97–108)
CO2 SERPL-SCNC: 28 MMOL/L (ref 21–32)
COMMENT, HOLDF: NORMAL
COMMENT, HOLDF: NORMAL
CREAT SERPL-MCNC: 1.06 MG/DL (ref 0.7–1.3)
DIFFERENTIAL METHOD BLD: ABNORMAL
EOSINOPHIL # BLD: 0.1 K/UL (ref 0–0.4)
EOSINOPHIL NFR BLD: 2 % (ref 0–7)
ERYTHROCYTE [DISTWIDTH] IN BLOOD BY AUTOMATED COUNT: 12.6 % (ref 11.5–14.5)
GLOBULIN SER CALC-MCNC: 4.6 G/DL (ref 2–4)
GLUCOSE SERPL-MCNC: 93 MG/DL (ref 65–100)
HCT VFR BLD AUTO: 44.2 % (ref 36.6–50.3)
HGB BLD-MCNC: 14.6 G/DL (ref 12.1–17)
IMM GRANULOCYTES # BLD: 0 K/UL (ref 0–0.04)
IMM GRANULOCYTES NFR BLD AUTO: 0 % (ref 0–0.5)
LYMPHOCYTES # BLD: 2.2 K/UL (ref 0.8–3.5)
LYMPHOCYTES NFR BLD: 38 % (ref 12–49)
MCH RBC QN AUTO: 29.4 PG (ref 26–34)
MCHC RBC AUTO-ENTMCNC: 33 G/DL (ref 30–36.5)
MCV RBC AUTO: 89.1 FL (ref 80–99)
MONOCYTES # BLD: 1.1 K/UL (ref 0–1)
MONOCYTES NFR BLD: 18 % (ref 5–13)
NEUTS SEG # BLD: 2.5 K/UL (ref 1.8–8)
NEUTS SEG NFR BLD: 42 % (ref 32–75)
NRBC # BLD: 0 K/UL (ref 0–0.01)
NRBC BLD-RTO: 0 PER 100 WBC
PLATELET # BLD AUTO: 189 K/UL (ref 150–400)
PMV BLD AUTO: 11.4 FL (ref 8.9–12.9)
POTASSIUM SERPL-SCNC: 3.6 MMOL/L (ref 3.5–5.1)
PROT SERPL-MCNC: 7.8 G/DL (ref 6.4–8.2)
RBC # BLD AUTO: 4.96 M/UL (ref 4.1–5.7)
SAMPLES BEING HELD,HOLD: NORMAL
SAMPLES BEING HELD,HOLD: NORMAL
SODIUM SERPL-SCNC: 138 MMOL/L (ref 136–145)
WBC # BLD AUTO: 5.9 K/UL (ref 4.1–11.1)

## 2018-09-09 PROCEDURE — 99284 EMERGENCY DEPT VISIT MOD MDM: CPT

## 2018-09-09 PROCEDURE — 86735 MUMPS ANTIBODY: CPT | Performed by: EMERGENCY MEDICINE

## 2018-09-09 PROCEDURE — 85025 COMPLETE CBC W/AUTO DIFF WBC: CPT | Performed by: EMERGENCY MEDICINE

## 2018-09-09 PROCEDURE — 96365 THER/PROPH/DIAG IV INF INIT: CPT

## 2018-09-09 PROCEDURE — 70487 CT MAXILLOFACIAL W/DYE: CPT

## 2018-09-09 PROCEDURE — 74011250636 HC RX REV CODE- 250/636: Performed by: EMERGENCY MEDICINE

## 2018-09-09 PROCEDURE — 36415 COLL VENOUS BLD VENIPUNCTURE: CPT | Performed by: EMERGENCY MEDICINE

## 2018-09-09 PROCEDURE — 74011000258 HC RX REV CODE- 258: Performed by: EMERGENCY MEDICINE

## 2018-09-09 PROCEDURE — 74011636320 HC RX REV CODE- 636/320: Performed by: EMERGENCY MEDICINE

## 2018-09-09 PROCEDURE — 80053 COMPREHEN METABOLIC PANEL: CPT | Performed by: EMERGENCY MEDICINE

## 2018-09-09 RX ORDER — SODIUM CHLORIDE 0.9 % (FLUSH) 0.9 %
10 SYRINGE (ML) INJECTION
Status: COMPLETED | OUTPATIENT
Start: 2018-09-09 | End: 2018-09-09

## 2018-09-09 RX ORDER — CEPHALEXIN 500 MG/1
500 CAPSULE ORAL 4 TIMES DAILY
Qty: 28 CAP | Refills: 0 | Status: SHIPPED | OUTPATIENT
Start: 2018-09-09 | End: 2018-09-16

## 2018-09-09 RX ADMIN — CEFTRIAXONE 1 G: 1 INJECTION, POWDER, FOR SOLUTION INTRAMUSCULAR; INTRAVENOUS at 19:33

## 2018-09-09 RX ADMIN — Medication 10 ML: at 18:28

## 2018-09-09 RX ADMIN — SODIUM CHLORIDE 100 ML: 900 INJECTION, SOLUTION INTRAVENOUS at 18:28

## 2018-09-09 RX ADMIN — IOPAMIDOL 100 ML: 612 INJECTION, SOLUTION INTRAVENOUS at 18:20

## 2018-09-09 NOTE — ED NOTES
4:11 PM 
I have evaluated the patient as the Provider in Triage. I have reviewed His vital signs and the triage nurse assessment. I have talked with the patient and any available family and advised that I am the provider in triage and have ordered the appropriate study to initiate their work up based on the clinical presentation during my assessment. I have advised that the patient will be accommodated in the Main ED as soon as possible. I have also requested to contact the triage nurse or myself immediately if the patient experiences any changes in their condition during this brief waiting period. 31yo, per records transitioning from male to female c/o L facial swelling and pain x 3 days, increased facial pain with eating. No dental pain or fever.  
Manford Buerger, PA-C

## 2018-09-09 NOTE — LETTER
[address] 
[address] [facility] [address] 
[phone] Work/School Note Date: 9/9/2018 To Whom It May concern: Trenda Hatchet was seen and treated today in the emergency room by the following provider(s): 
Attending Provider: Rebecca Celestin MD.   
 
Trenda Hatchet may return to work on 9/12/18. Sincerely, Mary Moreno RN

## 2018-09-09 NOTE — ED TRIAGE NOTES
Patient arrives c/o LEFT sided face swelling, taking benadryl at home with no change. Unknown if was a bite or exposure. Denies difficulty swallowing, but does claim pain.  Denies n/v/d or change in soaps, foods, meds etc

## 2018-09-09 NOTE — DISCHARGE INSTRUCTIONS
Parotitis: Care Instructions  Your Care Instructions    Parotitis is a painful swelling of your parotid glands, which are salivary glands located between the ear and jaw. The most common cause is a virus, such as mumps, herpes, or Bartolome-Barr. Bacterial infections, diabetes, tumors or stones in the saliva glands, and tooth problems also may cause parotitis. Follow-up care is a key part of your treatment and safety. Be sure to make and go to all appointments, and call your doctor if you are having problems. It's also a good idea to know your test results and keep a list of the medicines you take. How can you care for yourself at home? · Use an over-the-counter pain medicine if needed, such as acetaminophen (Tylenol), ibuprofen (Advil, Motrin), or naproxen (Aleve). Be safe with medicines. Read and follow all instructions on the label. Do not give aspirin to anyone younger than 20. It has been linked to Reye syndrome, a serious illness. · Put an ice or heat pack (whichever feels better) on the swollen jaw for 10 to 20 minutes at a time. Put a thin cloth between the ice or heat pack and the skin. · Suck on ice chips or ice treats such as Popsicles. Eat soft foods that do not have to be chewed much. Do not eat sour foods or liquids. If your salivary glands are very sore, eating these foods will usually cause them to hurt more. · If your doctor prescribed antibiotics, take them as directed. Do not stop taking them just because you feel better. You need to take the full course of antibiotics. To prevent tooth problems  · Brush and floss every day, and have regular dental checkups. · Eat a healthy diet, and avoid sugary foods and drinks. · Do not smoke or use spit tobacco. Tobacco use slows your ability to heal. It also increases your risk for gum disease and cancer of the mouth and throat. If you need help quitting, talk to your doctor about stop-smoking programs and medicines.  These can increase your chances of quitting for good. When should you call for help? Call 911 anytime you think you may need emergency care. For example, call if:    · You have trouble breathing.    Call your doctor now or seek immediate medical care if:    · You have new or worse symptoms of infection, such as:  ¨ Increased pain, swelling, warmth, or redness. ¨ Red streaks leading from the area. ¨ Pus draining from the area. ¨ A fever.     · You have new pain, or the pain gets worse.    Watch closely for changes in your health, and be sure to contact your doctor if:    · You do not feel better as expected. Where can you learn more? Go to http://david-sanchez.info/. Enter F561 in the search box to learn more about \"Parotitis: Care Instructions. \"  Current as of: May 12, 2017  Content Version: 11.7  © 6569-9815 [employer]. Care instructions adapted under license by [employer] (which disclaims liability or warranty for this information). If you have questions about a medical condition or this instruction, always ask your healthcare professional. [place] [address] any warranty or liability for your use of this information. Parotitis: Care Instructions  Your Care Instructions    Parotitis is a painful swelling of your parotid glands, which are salivary glands located between the ear and jaw. The most common cause is a virus, such as mumps, herpes, or Bartolome-Barr. Bacterial infections, diabetes, tumors or stones in the saliva glands, and tooth problems also may cause parotitis. Follow-up care is a key part of your treatment and safety. Be sure to make and go to all appointments, and call your doctor if you are having problems. It's also a good idea to know your test results and keep a list of the medicines you take. How can you care for yourself at home?   · Use an over-the-counter pain medicine if needed, such as acetaminophen (Tylenol), ibuprofen (Advil, Motrin), or naproxen (Aleve). Be safe with medicines. Read and follow all instructions on the label. Do not give aspirin to anyone younger than 20. It has been linked to Reye syndrome, a serious illness. · Put an ice or heat pack (whichever feels better) on the swollen jaw for 10 to 20 minutes at a time. Put a thin cloth between the ice or heat pack and the skin. · Suck on ice chips or ice treats such as Popsicles. Eat soft foods that do not have to be chewed much. Do not eat sour foods or liquids. If your salivary glands are very sore, eating these foods will usually cause them to hurt more. · If your doctor prescribed antibiotics, take them as directed. Do not stop taking them just because you feel better. You need to take the full course of antibiotics. To prevent tooth problems  · Brush and floss every day, and have regular dental checkups. · Eat a healthy diet, and avoid sugary foods and drinks. · Do not smoke or use spit tobacco. Tobacco use slows your ability to heal. It also increases your risk for gum disease and cancer of the mouth and throat. If you need help quitting, talk to your doctor about stop-smoking programs and medicines. These can increase your chances of quitting for good. When should you call for help? Call 911 anytime you think you may need emergency care. For example, call if:    · You have trouble breathing.    Call your doctor now or seek immediate medical care if:    · You have new or worse symptoms of infection, such as:  ¨ Increased pain, swelling, warmth, or redness. ¨ Red streaks leading from the area. ¨ Pus draining from the area. ¨ A fever.     · You have new pain, or the pain gets worse.    Watch closely for changes in your health, and be sure to contact your doctor if:    · You do not feel better as expected. Where can you learn more? Go to http://david-sanchez.info/. Enter X452 in the search box to learn more about \"Parotitis: Care Instructions. \"  Current as of: May 12, 2017  Content Version: 11.7  © 5360-5905 [employer]MA, [employer]. Care instructions adapted under license by [employer] (which disclaims liability or warranty for this information). If you have questions about a medical condition or this instruction, always ask your healthcare professional. [address] any warranty or liability for your use of this information. We hope that we have addressed all of your medical concerns. The examination and treatment you received in the Emergency Department were for an emergent problem and were not intended as complete care. It is important that you follow up with your healthcare provider(s) for ongoing care. If your symptoms worsen or do not improve as expected, and you are unable to reach your usual health care provider(s), you should return to the Emergency Department. Today's healthcare is undergoing tremendous change, and patient satisfaction surveys are one of the many tools to assess the quality of medical care. You may receive a survey from the [employer] organization regarding your experience in the Emergency Department. I hope that your experience has been completely positive, particularly the medical care that I provided. As such, please participate in the survey; anything less than excellent does not meet my expectations or intentions. [address] [facility] and [address] [place] [address] participate in nationally recognized quality of care measures. If your blood pressure is greater than 120/80, as reported below, we urge that you seek medical care to address the potential of high blood pressure, commonly known as hypertension. Hypertension can be hereditary or can be caused by certain medical conditions, pain, stress, or \"white coat syndrome. \"       Please make an appointment with your health care provider(s) for follow up of your Emergency Department visit.        VITALS:   Patient Vitals for the past 8 hrs:   Temp Pulse Resp BP SpO2   09/09/18 1800 - - - (!) 154/94 98 %   09/09/18 1709 98.7 °F (37.1 °C) 90 18 (!) 153/98 98 %   09/09/18 1611 98.2 °F (36.8 °C) 90 18 (!) 169/108 94 %          Thank you for allowing us to provide you with medical care today. We realize that you have many choices for your emergency care needs. Please choose us in the future for any continued health care needs. Regramya RiddlePinzon Gabriela [facility], [address]   Office: [phone]            Recent Results (from the past 24 hour(s))   SAMPLES BEING HELD    Collection Time: 09/09/18  4:32 PM   Result Value Ref Range    SAMPLES BEING HELD 1RED 1BLU     COMMENT        Add-on orders for these samples will be processed based on acceptable specimen integrity and analyte stability, which may vary by analyte. METABOLIC PANEL, COMPREHENSIVE    Collection Time: 09/09/18  5:35 PM   Result Value Ref Range    Sodium 138 136 - 145 mmol/L    Potassium 3.6 3.5 - 5.1 mmol/L    Chloride 100 97 - 108 mmol/L    CO2 28 21 - 32 mmol/L    Anion gap 10 5 - 15 mmol/L    Glucose 93 65 - 100 mg/dL    BUN 11 6 - 20 MG/DL    Creatinine 1.06 0.70 - 1.30 MG/DL    BUN/Creatinine ratio 10 (L) 12 - 20      GFR est AA >60 >60 ml/min/1.73m2    GFR est non-AA >60 >60 ml/min/1.73m2    Calcium 8.1 (L) 8.5 - 10.1 MG/DL    Bilirubin, total 0.7 0.2 - 1.0 MG/DL    ALT (SGPT) 44 12 - 78 U/L    AST (SGOT) 33 15 - 37 U/L    Alk.  phosphatase 55 45 - 117 U/L    Protein, total 7.8 6.4 - 8.2 g/dL    Albumin 3.2 (L) 3.5 - 5.0 g/dL    Globulin 4.6 (H) 2.0 - 4.0 g/dL    A-G Ratio 0.7 (L) 1.1 - 2.2     CBC WITH AUTOMATED DIFF    Collection Time: 09/09/18  5:35 PM   Result Value Ref Range    WBC 5.9 4.1 - 11.1 K/uL    RBC 4.96 4.10 - 5.70 M/uL    HGB 14.6 12.1 - 17.0 g/dL    HCT 44.2 36.6 - 50.3 %    MCV 89.1 80.0 - 99.0 FL    MCH 29.4 26.0 - 34.0 PG    MCHC 33.0 30.0 - 36.5 g/dL    RDW 12.6 11.5 - 14.5 %    PLATELET 481 872 - 400 K/uL    MPV 11.4 8.9 - 12.9 FL    NRBC 0.0 0  WBC    ABSOLUTE NRBC 0.00 0.00 - 0.01 K/uL    NEUTROPHILS 42 32 - 75 %    LYMPHOCYTES 38 12 - 49 %    MONOCYTES 18 (H) 5 - 13 %    EOSINOPHILS 2 0 - 7 %    BASOPHILS 0 0 - 1 %    IMMATURE GRANULOCYTES 0 0.0 - 0.5 %    ABS. NEUTROPHILS 2.5 1.8 - 8.0 K/UL    ABS. LYMPHOCYTES 2.2 0.8 - 3.5 K/UL    ABS. MONOCYTES 1.1 (H) 0.0 - 1.0 K/UL    ABS. EOSINOPHILS 0.1 0.0 - 0.4 K/UL    ABS. BASOPHILS 0.0 0.0 - 0.1 K/UL    ABS. IMM. GRANS. 0.0 0.00 - 0.04 K/UL    DF AUTOMATED     SAMPLES BEING HELD    Collection Time: 09/09/18  5:35 PM   Result Value Ref Range    SAMPLES BEING HELD 1BLUE,2REDS     COMMENT        Add-on orders for these samples will be processed based on acceptable specimen integrity and analyte stability, which may vary by analyte. Ct Maxillofacial W Cont    Result Date: 9/9/2018  EXAM:  CT maxillofacial with contrast INDICATION:  Left facial swelling. COMPARISON: None TECHNIQUE: Helical CT of the facial bones with intravenous contrast with coronal and sagittal reformats. Images reviewed in soft tissue and bone windows. CT dose reduction was achieved through use of a standardized protocol tailored for this examination and automatic exposure control for dose modulation. Adaptive statistical iterative reconstruction (ASIR) was utilized. FINDINGS: The left parotid gland is enlarged and heterogeneous relative to the right. There is no evidence for soft tissue abscess or drainable collection. Prominent and mildly enlarged bilateral neck lymph nodes are likely reactive. The visualized paranasal sinuses and mastoid air cells are clear. Limited intracranial images are grossly normal. The bones are normal for age. IMPRESSION: Findings of left parotitis likely attributable to nonspecific infection or inflammation. There is no soft tissue abscess or drainable collection. Prominent and mildly enlarged bilateral neck lymph nodes are likely reactive.

## 2018-09-10 NOTE — ED NOTES
Patient verbalizes understanding of discharge paperwork. VSS, respirations even and unlabored and in no acute distress. Ambulated out of the department with a steady gait.

## 2018-09-11 LAB — MUV IGG SER IA-ACNC: 50.7 AU/ML

## 2018-09-12 LAB — MUV IGM SER IA-ACNC: <0.8 AU (ref 0–0.79)

## 2018-12-03 ENCOUNTER — OFFICE VISIT (OUTPATIENT)
Dept: INTERNAL MEDICINE CLINIC | Age: 33
End: 2018-12-03

## 2018-12-03 VITALS
HEART RATE: 69 BPM | TEMPERATURE: 98.4 F | HEIGHT: 71 IN | SYSTOLIC BLOOD PRESSURE: 208 MMHG | RESPIRATION RATE: 16 BRPM | OXYGEN SATURATION: 92 % | WEIGHT: 315 LBS | DIASTOLIC BLOOD PRESSURE: 112 MMHG | BODY MASS INDEX: 44.1 KG/M2

## 2018-12-03 DIAGNOSIS — F33.9 RECURRENT MAJOR DEPRESSIVE DISORDER, REMISSION STATUS UNSPECIFIED (HCC): ICD-10-CM

## 2018-12-03 DIAGNOSIS — I10 UNCONTROLLED HYPERTENSION: Primary | ICD-10-CM

## 2018-12-03 DIAGNOSIS — R46.89 NON-COMPLIANT BEHAVIOR: ICD-10-CM

## 2018-12-03 DIAGNOSIS — E66.01 MORBID OBESITY (HCC): ICD-10-CM

## 2018-12-03 DIAGNOSIS — Z23 ENCOUNTER FOR IMMUNIZATION: ICD-10-CM

## 2018-12-03 DIAGNOSIS — I51.7 LVH (LEFT VENTRICULAR HYPERTROPHY): ICD-10-CM

## 2018-12-03 RX ORDER — SERTRALINE HYDROCHLORIDE 50 MG/1
50 TABLET, FILM COATED ORAL DAILY
Qty: 30 TAB | Refills: 5 | Status: SHIPPED | OUTPATIENT
Start: 2018-12-03 | End: 2021-02-23 | Stop reason: SDUPTHER

## 2018-12-03 RX ORDER — LISINOPRIL AND HYDROCHLOROTHIAZIDE 20; 25 MG/1; MG/1
TABLET ORAL
Qty: 30 TAB | Refills: 5 | Status: SHIPPED | OUTPATIENT
Start: 2018-12-03 | End: 2019-07-01 | Stop reason: SDUPTHER

## 2018-12-03 RX ORDER — AMLODIPINE BESYLATE 5 MG/1
TABLET ORAL
Qty: 30 TAB | Refills: 5 | Status: SHIPPED | OUTPATIENT
Start: 2018-12-03 | End: 2019-07-01 | Stop reason: SDUPTHER

## 2018-12-03 NOTE — PROGRESS NOTES
Miguel Bueno is a 26 y.o. male and presents with Medication Refill (amLODIPine) and Hypertension Fareed [place] Subjective: 
MS. MCCLELLAN Pt works  now and will have insurance as of Jan 1st. 
 
Pt has a 32 yo male transitioning from male to female. She has a  PMH HTN and non compliance w medication. Pt relays she has NOT had any bp medication. BP Readings from Last 3 Encounters:  
12/03/18 (!) 208/112  
09/09/18 (!) 153/97  
08/11/17 (!) 148/108 Pt relays she has a h/o depression and was previously on sertraline w improved mood. She stopped it due to wt gain and felt better. Pt relays she has been having recurrent sxs of depression. She declines psychiatry referral. She would like to revisit sertraline. Morbid obesity-pt is interested in bariatric surgery. She has gone to seminars w her mother and is aware of the process. Pt is aware that she would have to be compliant w medication/diet and visits Review of Systems Constitutional: negative for fevers, chills, anorexia and weight loss Respiratory:  negative for cough, hemoptysis, dyspnea,wheezing CV:   negative for chest pain, palpitations, lower extremity edema GI:   negative for nausea, vomiting, diarrhea, abdominal pain,melena Endo:               negative for polyuria,polydipsia,polyphagia,heat intolerance Musculoskel: negative for myalgias, arthralgias, back pain, muscle weakness, joint pain Neurological:  negative for headaches, dizziness, vertigo, memory problems and gait Behavl/Psych: positive for feelings of anxiety, depression, mood changes denies SI Past Medical History:  
Diagnosis Date  Depression  Hypertension No past surgical history on file. Social History Socioeconomic History  Marital status: SINGLE Spouse name: Not on file  Number of children: Not on file  Years of education: Not on file  Highest education level: Not on file Tobacco Use  Smoking status: Light Tobacco Smoker Types: Cigarettes  Smokeless tobacco: Never Used Substance and Sexual Activity  Alcohol use: Yes Comment: socially  Drug use: No  
 Sexual activity: Not Currently Family History Problem Relation Age of Onset  Hypertension Mother  Hypertension Father Current Outpatient Medications Medication Sig Dispense Refill  sertraline (ZOLOFT) 50 mg tablet Take 1 Tab by mouth daily. 30 Tab 5  
 amLODIPine (NORVASC) 5 mg tablet TAKE ONE TABLET BY MOUTH ONCE DAILY 30 Tab 5  
 lisinopril-hydroCHLOROthiazide (PRINZIDE, ZESTORETIC) 20-25 mg per tablet TAKE ONE TABLET BY MOUTH ONCE DAILY 30 Tab 5  
 aspirin delayed-release 81 mg tablet Take 1 Tab by mouth daily. 30 Tab 0 No Known Allergies Objective: 
Visit Vitals BP (!) 208/112 (BP 1 Location: Left arm, BP Patient Position: Sitting) Pulse 69 Temp 98.4 °F (36.9 °C) (Oral) Resp 16 Ht 5' 11\" (1.803 m) Wt (!) 374 lb 6.4 oz (169.8 kg) SpO2 92% BMI 52.22 kg/m² Physical Exam:  
General appearance - alert, well appearing, and in no distress obese Mental status - alert, oriented to person, place, and time EYE-DEVANTE, EOMI, corneas normal, no foreign bodies Neck - supple, no significant adenopathy Chest - clear to auscultation, no wheezes, rales or rhonchi, symmetric air entry Heart - normal rate, regular rhythm, normal S1, S2, +ectopy and S4 Abdomen - obese Ext-peripheral pulses normal, no pedal edema, no clubbing or cyanosis Skin-Warm and dry. no hyperpigmentation, vitiligo, or suspicious lesions Neuro -alert, oriented, normal speech, no focal findings or movement disorder noted Neck-normal C-spine, no tenderness, full ROM without pain Results for orders placed or performed during the hospital encounter of 09/09/18 SAMPLES BEING HELD Result Value Ref Range SAMPLES BEING HELD 1RED 1BLU   
 COMMENT   Add-on orders for these samples will be processed based on acceptable specimen integrity and analyte stability, which may vary by analyte. MUMPS AB, IGG Result Value Ref Range Mumps Abs, IgG 50.7 Immune >10.9 AU/mL  
MUMPS AB, IGM Result Value Ref Range Mumps Ab, IgM <0.80 0.00 - 0.79 AU METABOLIC PANEL, COMPREHENSIVE Result Value Ref Range Sodium 138 136 - 145 mmol/L Potassium 3.6 3.5 - 5.1 mmol/L Chloride 100 97 - 108 mmol/L  
 CO2 28 21 - 32 mmol/L Anion gap 10 5 - 15 mmol/L Glucose 93 65 - 100 mg/dL BUN 11 6 - 20 MG/DL Creatinine 1.06 0.70 - 1.30 MG/DL  
 BUN/Creatinine ratio 10 (L) 12 - 20 GFR est AA >60 >60 ml/min/1.73m2 GFR est non-AA >60 >60 ml/min/1.73m2 Calcium 8.1 (L) 8.5 - 10.1 MG/DL Bilirubin, total 0.7 0.2 - 1.0 MG/DL  
 ALT (SGPT) 44 12 - 78 U/L  
 AST (SGOT) 33 15 - 37 U/L Alk. phosphatase 55 45 - 117 U/L Protein, total 7.8 6.4 - 8.2 g/dL Albumin 3.2 (L) 3.5 - 5.0 g/dL Globulin 4.6 (H) 2.0 - 4.0 g/dL A-G Ratio 0.7 (L) 1.1 - 2.2    
CBC WITH AUTOMATED DIFF Result Value Ref Range WBC 5.9 4.1 - 11.1 K/uL  
 RBC 4.96 4.10 - 5.70 M/uL  
 HGB 14.6 12.1 - 17.0 g/dL HCT 44.2 36.6 - 50.3 % MCV 89.1 80.0 - 99.0 FL  
 MCH 29.4 26.0 - 34.0 PG  
 MCHC 33.0 30.0 - 36.5 g/dL  
 RDW 12.6 11.5 - 14.5 % PLATELET 778 202 - 910 K/uL MPV 11.4 8.9 - 12.9 FL  
 NRBC 0.0 0  WBC ABSOLUTE NRBC 0.00 0.00 - 0.01 K/uL NEUTROPHILS 42 32 - 75 % LYMPHOCYTES 38 12 - 49 % MONOCYTES 18 (H) 5 - 13 % EOSINOPHILS 2 0 - 7 % BASOPHILS 0 0 - 1 % IMMATURE GRANULOCYTES 0 0.0 - 0.5 % ABS. NEUTROPHILS 2.5 1.8 - 8.0 K/UL  
 ABS. LYMPHOCYTES 2.2 0.8 - 3.5 K/UL  
 ABS. MONOCYTES 1.1 (H) 0.0 - 1.0 K/UL  
 ABS. EOSINOPHILS 0.1 0.0 - 0.4 K/UL  
 ABS. BASOPHILS 0.0 0.0 - 0.1 K/UL  
 ABS. IMM. GRANS. 0.0 0.00 - 0.04 K/UL  
 DF AUTOMATED    
SAMPLES BEING HELD Result Value Ref Range SAMPLES BEING HELD 1BLUE,2REDS   
 COMMENT   Add-on orders for these samples will be processed based on acceptable specimen integrity and analyte stability, which may vary by analyte. Assessment/Plan: ICD-10-CM ICD-9-CM 1. Uncontrolled hypertension I10 401.9 amLODIPine (NORVASC) 5 mg tablet  
   lisinopril-hydroCHLOROthiazide (PRINZIDE, ZESTORETIC) 20-25 mg per tablet REFERRAL TO BARIATRIC SURGERY 2. LVH (left ventricular hypertrophy) I51.7 429.3 3. Morbid obesity ([address] E66.01 278.01 REFERRAL TO BARIATRIC SURGERY 4. Non-compliant behavior R46.89 V40.39   
5. Recurrent major depressive disorder, remission status unspecified (HCC) F33.9 296.30 sertraline (ZOLOFT) 50 mg tablet 6. Encounter for immunization Z23 V03.89 INFLUENZA VIRUS VAC QUAD,SPLIT,PRESV FREE SYRINGE IM Orders Placed This Encounter  Influenza virus vaccine (QUADRIVALENT PRES FREE SYRINGE) IM [id number]) [address] [facility] Bariatric Surgery [facility] [place] Referral Priority:   Routine Referral Type:   Consultation Referral Reason:   Specialty Services Required Referred to Provider:   Bay Cormier MD  
  Number of Visits Requested:   1  
 sertraline (ZOLOFT) 50 mg tablet Sig: Take 1 Tab by mouth daily. Dispense:  30 Tab Refill:  5  
 amLODIPine (NORVASC) 5 mg tablet Sig: TAKE ONE TABLET BY MOUTH ONCE DAILY Dispense:  30 Tab Refill:  5 Please consider 90 day supplies to promote better adherence  lisinopril-hydroCHLOROthiazide (PRINZIDE, ZESTORETIC) 20-25 mg per tablet Sig: TAKE ONE TABLET BY MOUTH ONCE DAILY Dispense:  30 Tab Refill:  5 Please consider 90 day supplies to promote better adherence 1. Uncontrolled hypertension D/w pt risks of CKD/CHF/MI/CVA. ..with uncontrolled bp 
- amLODIPine (NORVASC) 5 mg tablet; TAKE ONE TABLET BY MOUTH ONCE DAILY  Dispense: 30 Tab; Refill: 5 
- lisinopril-hydroCHLOROthiazide (PRINZIDE, ZESTORETIC) 20-25 mg per tablet; TAKE ONE TABLET BY MOUTH ONCE DAILY  Dispense: 30 Tab; Refill: 5 
- REFERRAL TO BARIATRIC SURGERY 2. LVH (left ventricular hypertrophy) Noted 3. Morbid obesity ([facility] [address] 
 
- REFERRAL TO BARIATRIC SURGERY 4. Non-compliant behavior Noted 
?due to lack of insurance (in part) 5. Recurrent major depressive disorder, remission status unspecified ([facility] Restart sertraline 
- sertraline (ZOLOFT) 50 mg tablet; Take 1 Tab by mouth daily. Dispense: 30 Tab; Refill: 5 
 
6. Encounter for immunization Given 
- INFLUENZA VIRUS VAC QUAD,SPLIT,PRESV FREE SYRINGE IM Patient Instructions Low Sodium Diet (2,000 Milligram): Care Instructions Your Care Instructions Too much sodium causes your body to hold on to extra water. This can raise your blood pressure and force your heart and kidneys to work harder. In very serious cases, this could cause you to be put in the hospital. It might even be life-threatening. By limiting sodium, you will feel better and lower your risk of serious problems. The most common source of sodium is salt. People get most of the salt in their diet from canned, prepared, and packaged foods. Fast food and restaurant meals also are very high in sodium. Your doctor will probably limit your sodium to less than 2,000 milligrams (mg) a day. This limit counts all the sodium in prepared and packaged foods and any salt you add to your food. Follow-up care is a key part of your treatment and safety. Be sure to make and go to all appointments, and call your doctor if you are having problems. It's also a good idea to know your test results and keep a list of the medicines you take. How can you care for yourself at home? Read food labels · Read labels on cans and food packages. The labels tell you how much sodium is in each serving. Make sure that you look at the serving size. If you eat more than the serving size, you have eaten more sodium. · Food labels also tell you the Percent Daily Value for sodium. Choose products with low Percent Daily Values for sodium. · Be aware that sodium can come in forms other than salt, including monosodium glutamate (MSG), sodium citrate, and sodium bicarbonate (baking soda). MSG is often added to Asian food. When you eat out, you can sometimes ask for food without MSG or added salt. Buy low-sodium foods · Buy foods that are labeled \"unsalted\" (no salt added), \"sodium-free\" (less than 5 mg of sodium per serving), or \"low-sodium\" (less than 140 mg of sodium per serving). Foods labeled \"reduced-sodium\" and \"light sodium\" may still have too much sodium. Be sure to read the label to see how much sodium you are getting. · Buy fresh vegetables, or frozen vegetables without added sauces. Buy low-sodium versions of canned vegetables, soups, and other canned goods. Prepare low-sodium meals · Cut back on the amount of salt you use in cooking. This will help you adjust to the taste. Do not add salt after cooking. One teaspoon of salt has about 2,300 mg of sodium. · Take the salt shaker off the table. · Flavor your food with garlic, lemon juice, onion, vinegar, herbs, and spices. Do not use soy sauce, lite soy sauce, steak sauce, onion salt, garlic salt, celery salt, mustard, or ketchup on your food. · Use low-sodium salad dressings, sauces, and ketchup. Or make your own salad dressings and sauces without adding salt. · Use less salt (or none) when recipes call for it. You can often use half the salt a recipe calls for without losing flavor. Other foods such as rice, pasta, and grains do not need added salt. · Rinse canned vegetables, and cook them in fresh water. This removes somebut not allof the salt. · Avoid water that is naturally high in sodium or that has been treated with water softeners, which add sodium. Call your local water company to find out the sodium content of your water supply. If you buy bottled water, read the label and choose a sodium-free brand. Avoid high-sodium foods · Avoid eating: ? Smoked, cured, salted, and canned meat, fish, and poultry. ? Ham, vázquez, hot dogs, and luncheon meats. ? Regular, hard, and processed cheese and regular peanut butter. ? Crackers with salted tops, and other salted snack foods such as pretzels, chips, and salted popcorn. ? Frozen prepared meals, unless labeled low-sodium. ? Canned and dried soups, broths, and bouillon, unless labeled sodium-free or low-sodium. ? Canned vegetables, unless labeled sodium-free or low-sodium. ? Carballo Juan fries, pizza, tacos, and other fast foods. ? Pickles, olives, ketchup, and other condiments, especially soy sauce, unless labeled sodium-free or low-sodium. Where can you learn more? Go to http://david-sanchez.info/. Enter J028 in the search box to learn more about \"Low Sodium Diet (2,000 Milligram): Care Instructions. \" Current as of: March 29, 2018 Content Version: 11.8 © 8392-8146 [employer]. Care instructions adapted under license by [employer] (which disclaims liability or warranty for this information). If you have questions about a medical condition or this instruction, always ask your healthcare professional. [place] [address] any warranty or liability for your use of this information. Vaccine Information Statement Influenza (Flu) Vaccine (Inactivated or Recombinant): What you need to know Many Vaccine Information Statements are available in Malian and other languages. See www.immunize.org/vis Hojas de Información Sobre Vacunas están disponibles en Español y en muchos otros idiomas. Visite www.immunize.org/vis 1. Why get vaccinated? Influenza (flu) is a contagious disease that spreads around the [place] every year, usually between October and May. Flu is caused by influenza viruses, and is spread mainly by coughing, sneezing, and close contact. Anyone can get flu. Flu strikes suddenly and can last several days. Symptoms vary by age, but can include: 
 fever/chills  sore throat  muscle aches  fatigue  cough  headache  runny or stuffy nose Flu can also lead to pneumonia and blood infections, and cause diarrhea and seizures in children. If you have a medical condition, such as heart or lung disease, flu can make it worse. Flu is more dangerous for some people. Infants and young children, people 72years of age and older, pregnant women, and people with certain health conditions or a weakened immune system are at greatest risk. Each year thousands of people in the [facility] die from flu, and many more are hospitalized. Flu vaccine can: 
 keep you from getting flu, 
 make flu less severe if you do get it, and 
 keep you from spreading flu to your family and other people. 2. Inactivated and recombinant flu vaccines A dose of flu vaccine is recommended every flu season. Children 6 months through 6years of age may need two doses during the same flu season. Everyone else needs only one dose each flu season. Some inactivated flu vaccines contain a very small amount of a mercury-based preservative called thimerosal. Studies have not shown thimerosal in vaccines to be harmful, but flu vaccines that do not contain thimerosal are available. There is no live flu virus in flu shots. They cannot cause the flu. There are many flu viruses, and they are always changing. Each year a new flu vaccine is made to protect against three or four viruses that are likely to cause disease in the upcoming flu season. But even when the vaccine doesnt exactly match these viruses, it may still provide some protection Flu vaccine cannot prevent: 
 flu that is caused by a virus not covered by the vaccine, or 
 illnesses that look like flu but are not. It takes about 2 weeks for protection to develop after vaccination, and protection lasts through the flu season. 3. Some people should not get this vaccine Tell the person who is giving you the vaccine:  If you have any severe, life-threatening allergies. If you ever had a life-threatening allergic reaction after a dose of flu vaccine, or have a severe allergy to any part of this vaccine, you may be advised not to get vaccinated. Most, but not all, types of flu vaccine contain a small amount of egg protein.  If you ever had Guillain-Barré Syndrome (also called GBS). Some people with a history of GBS should not get this vaccine. This should be discussed with your doctor.  If you are not feeling well. It is usually okay to get flu vaccine when you have a mild illness, but you might be asked to come back when you feel better. 4. Risks of a vaccine reaction With any medicine, including vaccines, there is a chance of reactions. These are usually mild and go away on their own, but serious reactions are also possible. Most people who get a flu shot do not have any problems with it. Minor problems following a flu shot include:  
 soreness, redness, or swelling where the shot was given  hoarseness  sore, red or itchy eyes  cough  fever  aches  headache  itching  fatigue If these problems occur, they usually begin soon after the shot and last 1 or 2 days. More serious problems following a flu shot can include the following:  There may be a small increased risk of Guillain-Barré Syndrome (GBS) after inactivated flu vaccine. This risk has been estimated at 1 or 2 additional cases per million people vaccinated. This is much lower than the risk of severe complications from flu, which can be prevented by flu vaccine.  Young children who get the flu shot along with pneumococcal vaccine (PCV13) and/or DTaP vaccine at the same time might be slightly more likely to have a seizure caused by fever. Ask your doctor for more information. Tell your doctor if a child who is getting flu vaccine has ever had a seizure. Problems that could happen after any injected vaccine:  People sometimes faint after a medical procedure, including vaccination. Sitting or lying down for about 15 minutes can help prevent fainting, and injuries caused by a fall. Tell your doctor if you feel dizzy, or have vision changes or ringing in the ears.  Some people get severe pain in the shoulder and have difficulty moving the arm where a shot was given. This happens very rarely.  Any medication can cause a severe allergic reaction. Such reactions from a vaccine are very rare, estimated at about 1 in a million doses, and would happen within a few minutes to a few hours after the vaccination. As with any medicine, there is a very remote chance of a vaccine causing a serious injury or death. The safety of vaccines is always being monitored. For more information, visit: www.cdc.gov/vaccinesafety/ 
 
 
6. The National Vaccine Injury [employer] 
 
 The [employer] Injury Compensation Program (VICP) is a federal program that was created to compensate people who may have been injured by certain vaccines. Persons who believe they may have been injured by a vaccine can learn about the program and about filing a claim by calling 2-280.421.2076 or visiting the [employer] website at www.[facility].gov/vaccinecompensation. There is a time limit to file a claim for compensation. 7. How can I learn more?  Ask your healthcare provider. He or she can give you the vaccine package insert or suggest other sources of information.  Call your local or state health department.  Contact the Centers for Disease Control and Prevention (CDC): 
- Call 3-891.873.2588 (6-737-BZG-INFO) or 
- Visit CDCs website at www.cdc.gov/flu Vaccine Information Statement Inactivated Influenza Vaccine 8/7/2015 
42 DMITRY [place] 811ED-98 Department of Health and [employer] Centers for Disease Control and Prevention Office Use Only Follow-up Disposition: 
Return in about 4 weeks (around 12/31/2018) for bp check. I have reviewed with the patient details of the assessment and plan and all questions were answered. Relevent patient education was performed. The most recent lab findings were reviewed with the patient. An After Visit Summary was printed and given to the patient.

## 2018-12-03 NOTE — PATIENT INSTRUCTIONS
Low Sodium Diet (2,000 Milligram): Care Instructions Your Care Instructions Too much sodium causes your body to hold on to extra water. This can raise your blood pressure and force your heart and kidneys to work harder. In very serious cases, this could cause you to be put in the hospital. It might even be life-threatening. By limiting sodium, you will feel better and lower your risk of serious problems. The most common source of sodium is salt. People get most of the salt in their diet from canned, prepared, and packaged foods. Fast food and restaurant meals also are very high in sodium. Your doctor will probably limit your sodium to less than 2,000 milligrams (mg) a day. This limit counts all the sodium in prepared and packaged foods and any salt you add to your food. Follow-up care is a key part of your treatment and safety. Be sure to make and go to all appointments, and call your doctor if you are having problems. It's also a good idea to know your test results and keep a list of the medicines you take. How can you care for yourself at home? Read food labels · Read labels on cans and food packages. The labels tell you how much sodium is in each serving. Make sure that you look at the serving size. If you eat more than the serving size, you have eaten more sodium. · Food labels also tell you the Percent Daily Value for sodium. Choose products with low Percent Daily Values for sodium. · Be aware that sodium can come in forms other than salt, including monosodium glutamate (MSG), sodium citrate, and sodium bicarbonate (baking soda). MSG is often added to Asian food. When you eat out, you can sometimes ask for food without MSG or added salt. Buy low-sodium foods · Buy foods that are labeled \"unsalted\" (no salt added), \"sodium-free\" (less than 5 mg of sodium per serving), or \"low-sodium\" (less than 140 mg of sodium per serving).  Foods labeled \"reduced-sodium\" and \"light sodium\" may still have too much sodium. Be sure to read the label to see how much sodium you are getting. · Buy fresh vegetables, or frozen vegetables without added sauces. Buy low-sodium versions of canned vegetables, soups, and other canned goods. Prepare low-sodium meals · Cut back on the amount of salt you use in cooking. This will help you adjust to the taste. Do not add salt after cooking. One teaspoon of salt has about 2,300 mg of sodium. · Take the salt shaker off the table. · Flavor your food with garlic, lemon juice, onion, vinegar, herbs, and spices. Do not use soy sauce, lite soy sauce, steak sauce, onion salt, garlic salt, celery salt, mustard, or ketchup on your food. · Use low-sodium salad dressings, sauces, and ketchup. Or make your own salad dressings and sauces without adding salt. · Use less salt (or none) when recipes call for it. You can often use half the salt a recipe calls for without losing flavor. Other foods such as rice, pasta, and grains do not need added salt. · Rinse canned vegetables, and cook them in fresh water. This removes somebut not allof the salt. · Avoid water that is naturally high in sodium or that has been treated with water softeners, which add sodium. Call your local water company to find out the sodium content of your water supply. If you buy bottled water, read the label and choose a sodium-free brand. Avoid high-sodium foods · Avoid eating: 
? Smoked, cured, salted, and canned meat, fish, and poultry. ? Ham, vázquez, hot dogs, and luncheon meats. ? Regular, hard, and processed cheese and regular peanut butter. ? Crackers with salted tops, and other salted snack foods such as pretzels, chips, and salted popcorn. ? Frozen prepared meals, unless labeled low-sodium. ? Canned and dried soups, broths, and bouillon, unless labeled sodium-free or low-sodium. ? Canned vegetables, unless labeled sodium-free or low-sodium. ? Western Jaki fries, pizza, tacos, and other fast foods. ? Pickles, olives, ketchup, and other condiments, especially soy sauce, unless labeled sodium-free or low-sodium. Where can you learn more? Go to http://david-sanchez.info/. Enter R270 in the search box to learn more about \"Low Sodium Diet (2,000 Milligram): Care Instructions. \" Current as of: March 29, 2018 Content Version: 11.8 © 7734-0557 [employer]. Care instructions adapted under license by [employer] (which disclaims liability or warranty for this information). If you have questions about a medical condition or this instruction, always ask your healthcare professional. [address] any warranty or liability for your use of this information. Vaccine Information Statement Influenza (Flu) Vaccine (Inactivated or Recombinant): What you need to know Many Vaccine Information Statements are available in [place] and other languages. See www.immunize.org/vis Hojas de Información Sobre Vacunas están disponibles en Español y en muchos otros idiomas. Visite www.immunize.org/vis 1. Why get vaccinated? Influenza (flu) is a contagious disease that spreads around the [place] every year, usually between October and May. Flu is caused by influenza viruses, and is spread mainly by coughing, sneezing, and close contact. Anyone can get flu. Flu strikes suddenly and can last several days. Symptoms vary by age, but can include: 
 fever/chills  sore throat  muscle aches  fatigue  cough  headache  runny or stuffy nose Flu can also lead to pneumonia and blood infections, and cause diarrhea and seizures in children. If you have a medical condition, such as heart or lung disease, flu can make it worse. Flu is more dangerous for some people.  Infants and young children, people 72years of age and older, pregnant women, and people with certain health conditions or a weakened immune system are at greatest risk. Each year thousands of people in the [facility] die from flu, and many more are hospitalized. Flu vaccine can: 
 keep you from getting flu, 
 make flu less severe if you do get it, and 
 keep you from spreading flu to your family and other people. 2. Inactivated and recombinant flu vaccines A dose of flu vaccine is recommended every flu season. Children 6 months through 6years of age may need two doses during the same flu season. Everyone else needs only one dose each flu season. Some inactivated flu vaccines contain a very small amount of a mercury-based preservative called thimerosal. Studies have not shown thimerosal in vaccines to be harmful, but flu vaccines that do not contain thimerosal are available. There is no live flu virus in flu shots. They cannot cause the flu. There are many flu viruses, and they are always changing. Each year a new flu vaccine is made to protect against three or four viruses that are likely to cause disease in the upcoming flu season. But even when the vaccine doesnt exactly match these viruses, it may still provide some protection Flu vaccine cannot prevent: 
 flu that is caused by a virus not covered by the vaccine, or 
 illnesses that look like flu but are not. It takes about 2 weeks for protection to develop after vaccination, and protection lasts through the flu season. 3. Some people should not get this vaccine Tell the person who is giving you the vaccine:  If you have any severe, life-threatening allergies. If you ever had a life-threatening allergic reaction after a dose of flu vaccine, or have a severe allergy to any part of this vaccine, you may be advised not to get vaccinated. Most, but not all, types of flu vaccine contain a small amount of egg protein.  If you ever had Guillain-Barré Syndrome (also called GBS). Some people with a history of GBS should not get this vaccine. This should be discussed with your doctor.  If you are not feeling well. It is usually okay to get flu vaccine when you have a mild illness, but you might be asked to come back when you feel better. 4. Risks of a vaccine reaction With any medicine, including vaccines, there is a chance of reactions. These are usually mild and go away on their own, but serious reactions are also possible. Most people who get a flu shot do not have any problems with it. Minor problems following a flu shot include:  
 soreness, redness, or swelling where the shot was given  hoarseness  sore, red or itchy eyes  cough  fever  aches  headache  itching  fatigue If these problems occur, they usually begin soon after the shot and last 1 or 2 days. More serious problems following a flu shot can include the following:  There may be a small increased risk of Guillain-Barré Syndrome (GBS) after inactivated flu vaccine. This risk has been estimated at 1 or 2 additional cases per million people vaccinated. This is much lower than the risk of severe complications from flu, which can be prevented by flu vaccine.  Young children who get the flu shot along with pneumococcal vaccine (PCV13) and/or DTaP vaccine at the same time might be slightly more likely to have a seizure caused by fever. Ask your doctor for more information. Tell your doctor if a child who is getting flu vaccine has ever had a seizure. Problems that could happen after any injected vaccine:  People sometimes faint after a medical procedure, including vaccination. Sitting or lying down for about 15 minutes can help prevent fainting, and injuries caused by a fall. Tell your doctor if you feel dizzy, or have vision changes or ringing in the ears.  
 
 Some people get severe pain in the shoulder and have difficulty moving the arm where a shot was given. This happens very rarely.  Any medication can cause a severe allergic reaction. Such reactions from a vaccine are very rare, estimated at about 1 in a million doses, and would happen within a few minutes to a few hours after the vaccination. As with any medicine, there is a very remote chance of a vaccine causing a serious injury or death. The safety of vaccines is always being monitored. For more information, visit: www.cdc.gov/vaccinesafety/ 
 
5. What if there is a serious reaction? What should I look for?  Look for anything that concerns you, such as signs of a severe allergic reaction, very high fever, or unusual behavior. Signs of a severe allergic reaction can include hives, swelling of the face and throat, difficulty breathing, a fast heartbeat, dizziness, and weakness  usually within a few minutes to a few hours after the vaccination. What should I do?  If you think it is a severe allergic reaction or other emergency that cant wait, call 9-1-1 and get the person to the nearest hospital. Otherwise, call your doctor.  Reactions should be reported to the Vaccine Adverse Event Reporting System (VAERS). Your doctor should file this report, or you can do it yourself through  the VAERS web site at www.vaers. [facility].gov, or by calling 7-524.145.8362. VAERS does not give medical advice. 6. The National Vaccine Injury Compensation Program 
 
The [facility] Vaccine Injury Compensation Program (VICP) is a federal program that was created to compensate people who may have been injured by certain vaccines. Persons who believe they may have been injured by a vaccine can learn about the program and about filing a claim by calling 5-435.598.3489 or visiting the [employer] website at www.[facility].gov/vaccinecompensation. There is a time limit to file a claim for compensation. 7. How can I learn more?  Ask your healthcare provider. He or she can give you the vaccine package insert or suggest other sources of information.  Call your local or state health department.  Contact the Centers for Disease Control and Prevention (CDC): 
- Call 9-873.581.5895 (1-800-CDC-INFO) or 
- Visit CDCs website at www.cdc.gov/flu Vaccine Information Statement Inactivated Influenza Vaccine 8/7/2015 
[facility] [id number] Department of [facility] and [employer] Centers for Disease Control and Prevention Office Use Only

## 2018-12-03 NOTE — PROGRESS NOTES
Jason Mcbride is a 26 y.o. male who presents for routine immunizations. He denies any symptoms , reactions or allergies that would exclude them from being immunized today. Risks and adverse reactions were discussed and the VIS was given to them. All questions were addressed. He was observed for 10 min post injection. There were no reactions observed.  
 
Christy Martinez LPN

## 2018-12-03 NOTE — PROGRESS NOTES
Chief Complaint Patient presents with  Medication Refill  
  amLODIPine  Hypertension 1. Have you been to the ER, urgent care clinic since your last visit? Hospitalized since your last visit? Yes When: 09/09/18 Where: [address] [place] [address] ED Reason for visit: Hypertension 2. Have you seen or consulted any other health care providers outside of the [address] [place] [address] since your last visit? Include any pap smears or colon screening. No 
 
PHQ over the last two weeks 12/3/2018 PHQ Not Done Active Diagnosis of Depression or Bipolar Disorder

## 2019-08-14 DIAGNOSIS — I10 UNCONTROLLED HYPERTENSION: ICD-10-CM

## 2019-08-14 RX ORDER — AMLODIPINE BESYLATE 5 MG/1
TABLET ORAL
Qty: 30 TAB | Refills: 0 | Status: SHIPPED | OUTPATIENT
Start: 2019-08-14 | End: 2019-09-23 | Stop reason: SDUPTHER

## 2019-08-14 RX ORDER — LISINOPRIL AND HYDROCHLOROTHIAZIDE 20; 25 MG/1; MG/1
TABLET ORAL
Qty: 30 TAB | Refills: 0 | Status: SHIPPED | OUTPATIENT
Start: 2019-08-14 | End: 2019-09-23 | Stop reason: SDUPTHER

## 2019-09-23 ENCOUNTER — OFFICE VISIT (OUTPATIENT)
Dept: INTERNAL MEDICINE CLINIC | Age: 34
End: 2019-09-23

## 2019-09-23 VITALS
HEIGHT: 71 IN | HEART RATE: 94 BPM | RESPIRATION RATE: 19 BRPM | SYSTOLIC BLOOD PRESSURE: 139 MMHG | BODY MASS INDEX: 44.1 KG/M2 | TEMPERATURE: 98 F | WEIGHT: 315 LBS | OXYGEN SATURATION: 96 % | DIASTOLIC BLOOD PRESSURE: 84 MMHG

## 2019-09-23 DIAGNOSIS — I10 ESSENTIAL HYPERTENSION: Primary | ICD-10-CM

## 2019-09-23 DIAGNOSIS — Z78.9 MALE-TO-FEMALE TRANSGENDER PERSON: ICD-10-CM

## 2019-09-23 DIAGNOSIS — I51.7 LVH (LEFT VENTRICULAR HYPERTROPHY): ICD-10-CM

## 2019-09-23 DIAGNOSIS — E66.01 MORBID OBESITY (HCC): ICD-10-CM

## 2019-09-23 DIAGNOSIS — Z23 ENCOUNTER FOR IMMUNIZATION: ICD-10-CM

## 2019-09-23 RX ORDER — AMLODIPINE BESYLATE 5 MG/1
TABLET ORAL
Qty: 90 TAB | Refills: 1 | Status: SHIPPED | OUTPATIENT
Start: 2019-09-23 | End: 2020-05-27

## 2019-09-23 RX ORDER — LISINOPRIL AND HYDROCHLOROTHIAZIDE 20; 25 MG/1; MG/1
TABLET ORAL
Qty: 90 TAB | Refills: 1 | Status: SHIPPED | OUTPATIENT
Start: 2019-09-23 | End: 2020-05-27

## 2019-09-23 NOTE — PROGRESS NOTES
Dougie Anaya is a 37 y.o. male and presents with Hypertension (Pt did not take it for the last to days)  . Subjective:  MS. MCCLELLAN    Pt showed up for appointment!!    Pt works  now and will have insurance as of Jan 1st.    Pts mother die of complicatons from CHF    Pt has a 36 yo male transitioning from male to female. She has a  PMH HTN and non compliance w medication. Pt relays she has been taking her medication REGULARLY  BP Readings from Last 3 Encounters:   09/23/19 139/84   12/03/18 (!) 208/112   09/09/18 (!) 153/97     Pt requests eval for endo to start hormone therapy. Pt relays she has a h/o depression and was previously on sertraline w improved mood. She stopped it due to wt gain and felt better. Pt relays she has been having recurrent sxs of depression. She will accept a therapists info, declines psychiatry. Will retry sertraline    Morbid obesity-pt is UNinterested in bariatric surgery. Review of Systems  Constitutional: negative for fevers, chills, anorexia and weight loss  Respiratory:  negative for cough, hemoptysis, dyspnea,wheezing  CV:   negative for chest pain, palpitations, lower extremity edema  GI:   negative for nausea, vomiting, diarrhea, abdominal pain,melena  Endo:               negative for polyuria,polydipsia,polyphagia,heat intolerance  Musculoskel: negative for myalgias, arthralgias, back pain, muscle weakness, joint pain  Neurological:  negative for headaches, dizziness, vertigo, memory problems and gait   Behavl/Psych: positive for feelings of anxiety, depression, mood changes denies SI    Past Medical History:   Diagnosis Date    Depression     Hypertension      History reviewed. No pertinent surgical history.   Social History     Socioeconomic History    Marital status: SINGLE     Spouse name: Not on file    Number of children: Not on file    Years of education: Not on file    Highest education level: Not on file   Tobacco Use    Smoking status: Light Tobacco Smoker     Types: Cigarettes    Smokeless tobacco: Never Used   Substance and Sexual Activity    Alcohol use: Yes     Comment: socially    Drug use: No     Types: Marijuana    Sexual activity: Not Currently     Family History   Problem Relation Age of Onset    Hypertension Mother     Hypertension Father      Current Outpatient Medications   Medication Sig Dispense Refill    lisinopril-hydroCHLOROthiazide (PRINZIDE, ZESTORETIC) 20-25 mg per tablet TAKE 1 TABLET BY MOUTH ONCE DAILY 90 Tab 1    amLODIPine (NORVASC) 5 mg tablet TAKE 1 TABLET BY MOUTH ONCE DAILY 90 Tab 1    sertraline (ZOLOFT) 50 mg tablet Take 1 Tab by mouth daily. 30 Tab 5    aspirin delayed-release 81 mg tablet Take 1 Tab by mouth daily. 30 Tab 0     No Known Allergies    Objective:  Visit Vitals  /84 (BP 1 Location: Left arm, BP Patient Position: Sitting)   Pulse 94   Temp 98 °F (36.7 °C) (Oral)   Resp 19   Ht 5' 11\" (1.803 m)   Wt (!) 377 lb (171 kg)   SpO2 96%   BMI 52.58 kg/m²     Physical Exam:   General appearance - alert,  and in no distress morbidly obese  Mental status - alert, oriented to person, place, and time  EYE-DEVANTE, EOMI, corneas normal, no foreign bodies  Neck - supple, no significant adenopathy   Chest - clear to auscultation, no wheezes, rales or rhonchi, symmetric air entry   Heart - normal rate, regular rhythm, normal S1, S2, +ectopy and S4 + 2/6 systolic murmur  Abdomen - obese  Ext-peripheral pulses normal, no pedal edema, no clubbing or cyanosis  Skin-Warm and dry.  no hyperpigmentation, vitiligo, or suspicious lesions  Neuro -alert, oriented, normal speech, no focal findings or movement disorder noted  Neck-normal C-spine, no tenderness, full ROM without pain        Results for orders placed or performed during the hospital encounter of 09/09/18   SAMPLES BEING HELD   Result Value Ref Range    SAMPLES BEING HELD 1RED 1BLU     COMMENT        Add-on orders for these samples will be processed based on acceptable specimen integrity and analyte stability, which may vary by analyte. MUMPS AB, IGG   Result Value Ref Range    Mumps Abs, IgG 50.7 Immune >10.9 AU/mL   MUMPS AB, IGM   Result Value Ref Range    Mumps Ab, IgM <0.80 0.00 - 8.55 AU   METABOLIC PANEL, COMPREHENSIVE   Result Value Ref Range    Sodium 138 136 - 145 mmol/L    Potassium 3.6 3.5 - 5.1 mmol/L    Chloride 100 97 - 108 mmol/L    CO2 28 21 - 32 mmol/L    Anion gap 10 5 - 15 mmol/L    Glucose 93 65 - 100 mg/dL    BUN 11 6 - 20 MG/DL    Creatinine 1.06 0.70 - 1.30 MG/DL    BUN/Creatinine ratio 10 (L) 12 - 20      GFR est AA >60 >60 ml/min/1.73m2    GFR est non-AA >60 >60 ml/min/1.73m2    Calcium 8.1 (L) 8.5 - 10.1 MG/DL    Bilirubin, total 0.7 0.2 - 1.0 MG/DL    ALT (SGPT) 44 12 - 78 U/L    AST (SGOT) 33 15 - 37 U/L    Alk. phosphatase 55 45 - 117 U/L    Protein, total 7.8 6.4 - 8.2 g/dL    Albumin 3.2 (L) 3.5 - 5.0 g/dL    Globulin 4.6 (H) 2.0 - 4.0 g/dL    A-G Ratio 0.7 (L) 1.1 - 2.2     CBC WITH AUTOMATED DIFF   Result Value Ref Range    WBC 5.9 4.1 - 11.1 K/uL    RBC 4.96 4.10 - 5.70 M/uL    HGB 14.6 12.1 - 17.0 g/dL    HCT 44.2 36.6 - 50.3 %    MCV 89.1 80.0 - 99.0 FL    MCH 29.4 26.0 - 34.0 PG    MCHC 33.0 30.0 - 36.5 g/dL    RDW 12.6 11.5 - 14.5 %    PLATELET 565 324 - 177 K/uL    MPV 11.4 8.9 - 12.9 FL    NRBC 0.0 0  WBC    ABSOLUTE NRBC 0.00 0.00 - 0.01 K/uL    NEUTROPHILS 42 32 - 75 %    LYMPHOCYTES 38 12 - 49 %    MONOCYTES 18 (H) 5 - 13 %    EOSINOPHILS 2 0 - 7 %    BASOPHILS 0 0 - 1 %    IMMATURE GRANULOCYTES 0 0.0 - 0.5 %    ABS. NEUTROPHILS 2.5 1.8 - 8.0 K/UL    ABS. LYMPHOCYTES 2.2 0.8 - 3.5 K/UL    ABS. MONOCYTES 1.1 (H) 0.0 - 1.0 K/UL    ABS. EOSINOPHILS 0.1 0.0 - 0.4 K/UL    ABS. BASOPHILS 0.0 0.0 - 0.1 K/UL    ABS. IMM.  GRANS. 0.0 0.00 - 0.04 K/UL    DF AUTOMATED     SAMPLES BEING HELD   Result Value Ref Range    SAMPLES BEING HELD 1BLUE,2REDS     COMMENT        Add-on orders for these samples will be processed based on acceptable specimen integrity and analyte stability, which may vary by analyte. Assessment/Plan:    ICD-10-CM ICD-9-CM    1. Essential hypertension I10 401.9 lisinopril-hydroCHLOROthiazide (PRINZIDE, ZESTORETIC) 20-25 mg per tablet      amLODIPine (NORVASC) 5 mg tablet   2. Morbid obesity ([facility] [address] E66.01 278.01 REFERRAL TO ENDOCRINOLOGY   3. Male-to-female transgender person F64.0 36.80 REFERRAL TO ENDOCRINOLOGY   4. LVH (left ventricular hypertrophy) I51.7 429.3    5. Encounter for immunization Z23 V03.89 INFLUENZA VIRUS VAC QUAD,SPLIT,PRESV FREE SYRINGE IM     Orders Placed This Encounter    INFLUENZA VIRUS VACCINE QUADRIVALENT, PRESERVATIVE FREE SYRINGE (31382)    REFERRAL TO ENDOCRINOLOGY     Referral Priority:   Routine     Referral Type:   Consultation     Referral Reason:   Specialty Services Required     Number of Visits Requested:   1    lisinopril-hydroCHLOROthiazide (PRINZIDE, ZESTORETIC) 20-25 mg per tablet     Sig: TAKE 1 TABLET BY MOUTH ONCE DAILY     Dispense:  90 Tab     Refill:  1     Please consider 90 day supplies to promote better adherence    amLODIPine (NORVASC) 5 mg tablet     Sig: TAKE 1 TABLET BY MOUTH ONCE DAILY     Dispense:  90 Tab     Refill:  1     Please consider 90 day supplies to promote better adherence   1. Essential hypertension  Improved  - lisinopril-hydroCHLOROthiazide (PRINZIDE, ZESTORETIC) 20-25 mg per tablet; TAKE 1 TABLET BY MOUTH ONCE DAILY  Dispense: 90 Tab; Refill: 1  - amLODIPine (NORVASC) 5 mg tablet; TAKE 1 TABLET BY MOUTH ONCE DAILY  Dispense: 90 Tab; Refill: 1    2. Morbid obesity ([facility]  Encourage wt loss  - REFERRAL TO ENDOCRINOLOGY    3. Male-to-female transgender person    - REFERRAL TO ENDOCRINOLOGY    4. LVH (left ventricular hypertrophy)      5. Encounter for immunization    - INFLUENZA VIRUS VAC QUAD,SPLIT,PRESV FREE SYRINGE IM      There are no Patient Instructions on file for this visit.    Follow-up and Dispositions    · Return in about 3 months (around 12/23/2019) for BP f/u. I have reviewed with the patient details of the assessment and plan and all questions were answered. Relevent patient education was performed. The most recent lab findings were reviewed with the patient. An After Visit Summary was printed and given to the patient.

## 2019-09-23 NOTE — PROGRESS NOTES
Chief Complaint   Patient presents with    Hypertension     Pt did not take it for the last to days     1. Have you been to the ER, urgent care clinic since your last visit? Hospitalized since your last visit? No    2. Have you seen or consulted any other health care providers outside of the [address] [place] [address] since your last visit? Include any pap smears or colon screening. No      After obtaining consent, and per orders of Dr. Carolee Andrade, injection of Influenza given by Shaan Espinal LPN. Patient instructed to remain in clinic for 15 minutes afterwards, and to report any adverse reaction to me immediately.

## 2020-05-26 DIAGNOSIS — I10 ESSENTIAL HYPERTENSION: ICD-10-CM

## 2020-05-27 RX ORDER — AMLODIPINE BESYLATE 5 MG/1
TABLET ORAL
Qty: 90 TAB | Refills: 0 | Status: SHIPPED | OUTPATIENT
Start: 2020-05-27 | End: 2020-09-23

## 2020-05-27 RX ORDER — LISINOPRIL AND HYDROCHLOROTHIAZIDE 20; 25 MG/1; MG/1
TABLET ORAL
Qty: 90 TAB | Refills: 0 | Status: SHIPPED | OUTPATIENT
Start: 2020-05-27 | End: 2020-09-23

## 2020-07-17 ENCOUNTER — HOSPITAL ENCOUNTER (EMERGENCY)
Age: 35
Discharge: HOME | End: 2020-07-17
Attending: STUDENT IN AN ORGANIZED HEALTH CARE EDUCATION/TRAINING PROGRAM | Admitting: STUDENT IN AN ORGANIZED HEALTH CARE EDUCATION/TRAINING PROGRAM

## 2020-07-17 VITALS
OXYGEN SATURATION: 99 % | HEART RATE: 81 BPM | TEMPERATURE: 99.4 F | RESPIRATION RATE: 16 BRPM | SYSTOLIC BLOOD PRESSURE: 131 MMHG | DIASTOLIC BLOOD PRESSURE: 80 MMHG

## 2020-07-17 DIAGNOSIS — R21 RASH AND OTHER NONSPECIFIC SKIN ERUPTION: Primary | ICD-10-CM

## 2020-07-17 LAB — S PYO AG THROAT QL: NEGATIVE

## 2020-07-17 PROCEDURE — 87070 CULTURE OTHR SPECIMN AEROBIC: CPT

## 2020-07-17 PROCEDURE — 74011250637 HC RX REV CODE- 250/637: Performed by: EMERGENCY MEDICINE

## 2020-07-17 PROCEDURE — 87880 STREP A ASSAY W/OPTIC: CPT

## 2020-07-17 PROCEDURE — 99283 EMERGENCY DEPT VISIT LOW MDM: CPT

## 2020-07-17 RX ORDER — DIPHENHYDRAMINE HCL 50 MG
50 CAPSULE ORAL
Status: COMPLETED | OUTPATIENT
Start: 2020-07-17 | End: 2020-07-17

## 2020-07-17 RX ADMIN — DIPHENHYDRAMINE HYDROCHLORIDE 50 MG: 50 CAPSULE ORAL at 21:45

## 2020-07-17 NOTE — ED TRIAGE NOTES
Triage: Pt arrives from home with CC of rash to BUE, torso, and neck. Pt has had the rash x1 week. Has not started new detergents or new skin care routine. Denies any known allergies.

## 2020-07-18 NOTE — ED PROVIDER NOTES
58-year-old male presents emergency room for evaluation of a rash. Rash is been present for 1 week. No known precipitating events. No new soaps, detergents, clothing, jewelry, foods, lotions. Patient does admit to a sore throat. No other fever or chills. No abdominal pain, nausea or vomiting. No difficulty swallowing. No facial swelling. No muscle aches or body aches. No headache. No dysuria frequency urgency. Rash itches mildly but does not have pain. Patient has tried hydrocortisone cream with minimal relief. The history is provided by the patient. No  was used. Rash           Past Medical History:   Diagnosis Date    Depression     Hypertension        No past surgical history on file.       Family History:   Problem Relation Age of Onset    Hypertension Mother     Hypertension Father        Social History     Socioeconomic History    Marital status: SINGLE     Spouse name: Not on file    Number of children: Not on file    Years of education: Not on file    Highest education level: Not on file   Occupational History    Not on file   Social Needs    Financial resource strain: Not on file    Food insecurity     Worry: Not on file     Inability: Not on file    Transportation needs     Medical: Not on file     Non-medical: Not on file   Tobacco Use    Smoking status: Light Tobacco Smoker     Types: Cigarettes    Smokeless tobacco: Never Used   Substance and Sexual Activity    Alcohol use: Yes     Comment: socially    Drug use: No     Types: Marijuana    Sexual activity: Not Currently   Lifestyle    Physical activity     Days per week: Not on file     Minutes per session: Not on file    Stress: Not on file   Relationships    Social connections     Talks on phone: Not on file     Gets together: Not on file     Attends Bahai service: Not on file     Active member of club or organization: Not on file     Attends meetings of clubs or organizations: Not on file Relationship status: Not on file    Intimate partner violence     Fear of current or ex partner: Not on file     Emotionally abused: Not on file     Physically abused: Not on file     Forced sexual activity: Not on file   Other Topics Concern    Not on file   Social History Narrative    Not on file         ALLERGIES: Patient has no known allergies. Review of Systems   Constitutional: Negative for chills and fatigue. HENT: Negative for congestion and rhinorrhea. Respiratory: Negative for cough and shortness of breath. Cardiovascular: Negative for chest pain. Gastrointestinal: Negative for abdominal pain, diarrhea, nausea and vomiting. Genitourinary: Negative for decreased urine volume, difficulty urinating, dysuria, frequency, hematuria and urgency. Musculoskeletal: Negative for arthralgias, joint swelling, myalgias, neck pain and neck stiffness. Skin: Positive for rash. Negative for color change. Neurological: Negative for dizziness, light-headedness and numbness. All other systems reviewed and are negative. There were no vitals filed for this visit. Physical Exam  Vitals signs and nursing note reviewed. Constitutional:       General: He is not in acute distress. Appearance: Normal appearance. He is well-developed. He is not ill-appearing or toxic-appearing. HENT:      Head: Normocephalic and atraumatic. Right Ear: Tympanic membrane normal.      Left Ear: Tympanic membrane normal.      Nose: Nose normal. No congestion. Mouth/Throat:      Mouth: Mucous membranes are moist.      Pharynx: No oropharyngeal exudate. Comments: UVULA MIDLINE, NO TRISMUS, NO DROOLING, NO SUBMANDIBULAR SWELLING. NO TONSILLAR SWELLING OR EXUDATES.  +ERYTHEMA TO POSTERIOR PHARYNX.  PT TOLERATING SECRETIONS. NO MASTOID TENDERNESS BILATERALLY  Eyes:      Conjunctiva/sclera: Conjunctivae normal.      Pupils: Pupils are equal, round, and reactive to light.    Neck: Musculoskeletal: Normal range of motion and neck supple. No neck rigidity. Cardiovascular:      Rate and Rhythm: Normal rate and regular rhythm. Heart sounds: Normal heart sounds. Pulmonary:      Effort: Pulmonary effort is normal. No respiratory distress. Breath sounds: Normal breath sounds. Musculoskeletal: Normal range of motion. General: No tenderness. Lymphadenopathy:      Cervical: No cervical adenopathy. Skin:     General: Skin is warm and dry. Coloration: Skin is pale. Findings: Rash present. No erythema. Comments: Arms bilaterally, torso, front and back , chest, abdomen, raised red blanching lesions, no palms or soles. no pustules, vesicles, no papules. No petechiae, no open wounds, no lymphangitis   Neurological:      General: No focal deficit present. Mental Status: He is alert and oriented to person, place, and time. Cranial Nerves: No cranial nerve deficit. Motor: No abnormal muscle tone. Coordination: Coordination normal.   Psychiatric:         Mood and Affect: Mood normal.         Behavior: Behavior normal.         Thought Content: Thought content normal.         Judgment: Judgment normal.          MDM  Number of Diagnoses or Management Options  Rash and other nonspecific skin eruption:   Diagnosis management comments: 66-year-old male presenting to the emergency room for evaluation of a rash. Rash does not involve the palms or the soles. He is afebrile. Lungs are clear. Abdomen is soft nontender. No meningeal signs. No acute distress. As patient has sore throat will check rapid strep    11:03 PM  Rapid strep negative. Patient is well hydrated, well appearing, and in no respiratory distress. Physical exam is reassuring, and without signs of serious illness. Rash is blanching, sparing mucus membranes, palms and soles. No petechiae or purpura to suggest infectious/septic etiology.   No eye or MM involvement or target lesions to suggest EM or SJS. Differential diagnosis includes viral exanthem, contact dermatitis, or other nonspecific rash. Pt stable for discharge with symptomatic care and PCP f/u. Caregiver instructed to call or return with fever, rapid spread of rash, purulent drainage, mucus membrane involvement, difficulty breathing, or other concerning symptoms. Patient's results have been reviewed with them. Patient and/or family have verbally conveyed their understanding and agreement of the patient's signs, symptoms, diagnosis, treatment and prognosis and additionally agree to follow up as recommended or return to the Emergency Room should their condition change prior to follow-up. Discharge instructions have also been provided to the patient with some educational information regarding their diagnosis as well a list of reasons why they would want to return to the ER prior to their follow-up appointment should their condition change. Amount and/or Complexity of Data Reviewed  Discuss the patient with other providers: yes (ER attending-Ryland)    Patient Progress  Patient progress: stable         Procedures        Pt case including HPI, PE, and all available lab and radiology results has been discussed with attending physician. Opportunity to evaluate patient has been provided to ER attending. Discharge and prescription plan has been agreed upon.

## 2020-07-18 NOTE — DISCHARGE INSTRUCTIONS
You may claritin or zyrtec for rash. Avoid tight clothing and hot environments. Return to ER for any fever, chills, nausea, vomiting, body pain. Rash: Care Instructions  Your Care Instructions  A rash is any irritation or inflammation of the skin. Rashes have many possible causes, including allergy, infection, illness, heat, and emotional stress. Follow-up care is a key part of your treatment and safety. Be sure to make and go to all appointments, and call your doctor if you are having problems. It's also a good idea to know your test results and keep a list of the medicines you take. How can you care for yourself at home? · Wash the area with water only. Soap can make dryness and itching worse. Pat dry. · Put cold, wet cloths on the rash to reduce itching. · Keep cool, and stay out of the sun. · Leave the rash open to the air as much of the time as possible. · Sometimes petroleum jelly (Vaseline) can help relieve the discomfort caused by a rash. A moisturizing lotion, such as Cetaphil, also may help. Calamine lotion may help for rashes caused by contact with something (such as a plant or soap) that irritated the skin. Use it 3 or 4 times a day. · If your doctor prescribed a cream, use it as directed. If your doctor prescribed medicine, take it exactly as directed. · If your rash itches so badly that it interferes with your normal activities, take an over-the-counter antihistamine, such as diphenhydramine (Benadryl) or loratadine (Claritin). Read and follow all instructions on the label. When should you call for help? Call your doctor now or seek immediate medical care if:  · You have signs of infection, such as:  ? Increased pain, swelling, warmth, or redness. ? Red streaks leading from the area. ? Pus draining from the area. ? A fever. · You have joint pain along with the rash.   Watch closely for changes in your health, and be sure to contact your doctor if:  · Your rash is changing or getting worse. For example, call if you have pain along with the rash, the rash is spreading, or you have new blisters. · You do not get better after 1 week. Where can you learn more? Go to http://david-sanchez.info/  Enter U711 in the search box to learn more about \"Rash: Care Instructions. \"  Current as of: October 31, 2019               Content Version: 12.5  © 7134-3084 [employer]. Care instructions adapted under license by [employer] (which disclaims liability or warranty for this information). If you have questions about a medical condition or this instruction, always ask your healthcare professional. [address] any warranty or liability for your use of this information.

## 2020-07-18 NOTE — ED NOTES
Discharge instructions given to pt by RN and SN extern. Pt educated on prescribed medications in teach back method and verbalizes understanding. Opportunity for questions provided. Pt ambulatory out of unit, in no acute distress and taken home by self.

## 2020-07-19 LAB
BACTERIA SPEC CULT: NORMAL
SERVICE CMNT-IMP: NORMAL

## 2020-07-30 ENCOUNTER — VIRTUAL VISIT (OUTPATIENT)
Dept: INTERNAL MEDICINE CLINIC | Age: 35
End: 2020-07-30

## 2020-11-12 DIAGNOSIS — I10 ESSENTIAL HYPERTENSION: ICD-10-CM

## 2020-11-12 NOTE — TELEPHONE ENCOUNTER
Patient wants to know if he can get enough until his appt on 11/17 with [facility], he states that he has not had any for 3 days, patient would like to be notice if sent or not sent

## 2020-11-13 RX ORDER — LISINOPRIL AND HYDROCHLOROTHIAZIDE 20; 25 MG/1; MG/1
1 TABLET ORAL DAILY
Qty: 30 TAB | Refills: 0 | Status: SHIPPED | OUTPATIENT
Start: 2020-11-13 | End: 2021-01-04

## 2020-11-13 RX ORDER — AMLODIPINE BESYLATE 5 MG/1
5 TABLET ORAL DAILY
Qty: 30 TAB | Refills: 0 | Status: SHIPPED | OUTPATIENT
Start: 2020-11-13 | End: 2021-01-04

## 2021-01-04 DIAGNOSIS — I10 ESSENTIAL HYPERTENSION: ICD-10-CM

## 2021-01-04 RX ORDER — AMLODIPINE BESYLATE 5 MG/1
TABLET ORAL
Qty: 30 TAB | Refills: 0 | Status: SHIPPED | OUTPATIENT
Start: 2021-01-04 | End: 2021-02-23 | Stop reason: SDUPTHER

## 2021-01-04 RX ORDER — LISINOPRIL AND HYDROCHLOROTHIAZIDE 20; 25 MG/1; MG/1
TABLET ORAL
Qty: 30 TAB | Refills: 0 | Status: SHIPPED | OUTPATIENT
Start: 2021-01-04 | End: 2021-02-23 | Stop reason: SDUPTHER

## 2021-02-23 ENCOUNTER — OFFICE VISIT (OUTPATIENT)
Dept: INTERNAL MEDICINE CLINIC | Age: 36
End: 2021-02-23
Payer: COMMERCIAL

## 2021-02-23 VITALS
BODY MASS INDEX: 44.1 KG/M2 | SYSTOLIC BLOOD PRESSURE: 154 MMHG | RESPIRATION RATE: 17 BRPM | OXYGEN SATURATION: 98 % | HEART RATE: 74 BPM | TEMPERATURE: 98.3 F | DIASTOLIC BLOOD PRESSURE: 99 MMHG | HEIGHT: 71 IN | WEIGHT: 315 LBS

## 2021-02-23 DIAGNOSIS — E66.01 MORBID OBESITY (HCC): ICD-10-CM

## 2021-02-23 DIAGNOSIS — F33.9 RECURRENT MAJOR DEPRESSIVE DISORDER, REMISSION STATUS UNSPECIFIED (HCC): ICD-10-CM

## 2021-02-23 DIAGNOSIS — Z23 ENCOUNTER FOR IMMUNIZATION: ICD-10-CM

## 2021-02-23 DIAGNOSIS — Z11.3 SCREEN FOR STD (SEXUALLY TRANSMITTED DISEASE): ICD-10-CM

## 2021-02-23 DIAGNOSIS — I10 ESSENTIAL HYPERTENSION: Primary | ICD-10-CM

## 2021-02-23 PROCEDURE — 99214 OFFICE O/P EST MOD 30 MIN: CPT | Performed by: INTERNAL MEDICINE

## 2021-02-23 PROCEDURE — 90686 IIV4 VACC NO PRSV 0.5 ML IM: CPT | Performed by: INTERNAL MEDICINE

## 2021-02-23 PROCEDURE — 90471 IMMUNIZATION ADMIN: CPT | Performed by: INTERNAL MEDICINE

## 2021-02-23 RX ORDER — AMLODIPINE BESYLATE 5 MG/1
TABLET ORAL
Qty: 90 TAB | Refills: 1 | Status: SHIPPED | OUTPATIENT
Start: 2021-02-23 | End: 2021-10-01

## 2021-02-23 RX ORDER — SERTRALINE HYDROCHLORIDE 50 MG/1
50 TABLET, FILM COATED ORAL DAILY
Qty: 90 TAB | Refills: 1 | Status: SHIPPED | OUTPATIENT
Start: 2021-02-23 | End: 2021-02-23 | Stop reason: SINTOL

## 2021-02-23 RX ORDER — ASPIRIN 81 MG/1
81 TABLET ORAL DAILY
Qty: 90 TAB | Refills: 1 | Status: SHIPPED | OUTPATIENT
Start: 2021-02-23

## 2021-02-23 RX ORDER — LISINOPRIL AND HYDROCHLOROTHIAZIDE 20; 25 MG/1; MG/1
TABLET ORAL
Qty: 90 TAB | Refills: 1 | Status: SHIPPED | OUTPATIENT
Start: 2021-02-23 | End: 2021-10-01

## 2021-02-23 RX ORDER — VENLAFAXINE HYDROCHLORIDE 37.5 MG/1
37.5 CAPSULE, EXTENDED RELEASE ORAL DAILY
Qty: 30 CAP | Refills: 5 | Status: SHIPPED | OUTPATIENT
Start: 2021-02-23 | End: 2022-04-22

## 2021-02-23 NOTE — PROGRESS NOTES
Pt is here for   Chief Complaint   Patient presents with    Hypertension     follow up     Denies pain at this time    1. Have you been to the ER, urgent care clinic since your last visit? Hospitalized since your last visit? No    2. Have you seen or consulted any other health care providers outside of the [address] [place] [address] since your last visit? Include any pap smears or colon screening.  No

## 2021-02-23 NOTE — PROGRESS NOTES
Tanesha Maki is a 38 y.o. male and presents with Hypertension (follow up)  . Subjective:  MS. MCCLELLAN    Last appt 9/19. Pt has not had bp meds in awhile. Pt stopped sertraline due to decreased libido. Pt did not schedule an appt w endo as yet. Pts mother die of complicatons from CHF    HTN  BP Readings from Last 3 Encounters:   02/23/21 (!) 154/99   07/17/20 131/80   09/23/19 139/84         Pt relays she has a h/o depression and was previously on sertraline w improved mood. She stopped it due to wt gain and felt better. Pt relays she has been having recurrent sxs of depression. She will accept a therapists info, declines psychiatry. Will retry sertraline    Morbid obesity-pt is UNinterested in bariatric surgery. Review of Systems  Constitutional: negative for fevers, chills, anorexia and weight loss  Respiratory:  negative for cough, hemoptysis, dyspnea,wheezing  CV:   negative for chest pain, palpitations, lower extremity edema  GI:   negative for nausea, vomiting, diarrhea, abdominal pain,melena  Endo:               negative for polyuria,polydipsia,polyphagia,heat intolerance  Musculoskel: negative for myalgias, arthralgias, back pain, muscle weakness, joint pain  Neurological:  negative for headaches, dizziness, vertigo, memory problems and gait   Behavl/Psych: positive for feelings of anxiety, depression, mood changes denies SI    Past Medical History:   Diagnosis Date    Depression     Hypertension      History reviewed. No pertinent surgical history.   Social History     Socioeconomic History    Marital status: SINGLE     Spouse name: Not on file    Number of children: Not on file    Years of education: Not on file    Highest education level: Not on file   Tobacco Use    Smoking status: Former Smoker    Smokeless tobacco: Never Used   Substance and Sexual Activity    Alcohol use: Yes     Comment: socially    Drug use: No     Types: Marijuana    Sexual activity: Not Currently Family History   Problem Relation Age of Onset    Hypertension Mother     Hypertension Father      Current Outpatient Medications   Medication Sig Dispense Refill    amLODIPine (NORVASC) 5 mg tablet Take 1 tablet by mouth once daily 90 Tab 1    aspirin delayed-release 81 mg tablet Take 1 Tab by mouth daily. 90 Tab 1    lisinopril-hydroCHLOROthiazide (PRINZIDE, ZESTORETIC) 20-25 mg per tablet Take 1 tablet by mouth once daily 90 Tab 1    venlafaxine-SR (EFFEXOR-XR) 37.5 mg capsule Take 1 Cap by mouth daily. 30 Cap 5     No Known Allergies    Objective:  Visit Vitals  BP (!) 154/99 (BP 1 Location: Left upper arm, BP Patient Position: Sitting, BP Cuff Size: Thigh)   Pulse 74   Temp 98.3 °F (36.8 °C) (Oral)   Resp 17   Ht 5' 11\" (1.803 m)   Wt (!) 373 lb (169.2 kg)   SpO2 98%   BMI 52.02 kg/m²     Physical Exam:   General appearance - alert,  and in no distress morbidly obese  Mental status - alert, oriented to person, place, and time  EYE-EOMI  Chest - clear to auscultation, no wheezes, rales or rhonchi, symmetric air entry   Heart - normal rate, regular rhythm, normal S1, S2, +ectopy and S4 + 2/6 systolic murmur  Abdomen - obese  Ext-peripheral pulses normal, no pedal edema, no clubbing or cyanosis  Skin-Warm and dry. no hyperpigmentation, vitiligo, or suspicious lesions  Neuro -alert, oriented, normal speech, no focal findings or movement disorder noted  Neck-normal C-spine, no tenderness, full ROM without pain        Results for orders placed or performed during the hospital encounter of 07/17/20   CULTURE, THROAT    Specimen: Throat Swab   Result Value Ref Range    Special Requests: NO SPECIAL REQUESTS      Culture result: NORMAL RESPIRATORY RUPA/NO BETA STREP ISOLATED     POC GROUP A STREP   Result Value Ref Range    Group A strep (POC) Negative NEG         Assessment/Plan:    ICD-10-CM ICD-9-CM    1.  Essential hypertension  I10 401.9 amLODIPine (NORVASC) 5 mg tablet      lisinopril-hydroCHLOROthiazide (PRINZIDE, ZESTORETIC) 20-25 mg per tablet   2. Recurrent major depressive disorder, remission status unspecified ([facility])  F33.9 296.30 venlafaxine-SR (EFFEXOR-XR) 37.5 mg capsule   3. Screen for STD (sexually transmitted disease)  Z11.3 V74.5 HIV 1/2 AG/AB, 4TH GENERATION,W RFLX CONFIRM      T VAGINALIS AMPLIFICATION      CHLAMYDIA/GC PCR   4. Morbid obesity ([facility] [address]  E66.01 278.01 TSH REFLEX TO T4      HEMOGLOBIN A1C WITH EAG      LIPID PANEL      METABOLIC PANEL, COMPREHENSIVE      CBC WITH AUTOMATED DIFF   5. Encounter for immunization  Z23 V03.89 FLU (FLUAD QUAD INFLUENZA VACCINE,QUAD,ADJUVANTED)     Orders Placed This Encounter    CHLAMYDIA/GC PCR     Order Specific Question:   Sample source     Answer:   Urine [258]     Order Specific Question:   Specimen source     Answer:   Urine [258]    FLU (FLUAD QUAD INFLUENZA VACCINE,QUAD,ADJUVANTED)(73572)    TSH REFLEX TO T4    HEMOGLOBIN A1C WITH EAG    LIPID PANEL    METABOLIC PANEL, COMPREHENSIVE    CBC WITH AUTOMATED DIFF    HIV 1/2 AG/AB, 4TH GENERATION,W RFLX CONFIRM    T VAGINALIS AMPLIFICATION     Order Specific Question:   Specimen source     Answer:   Urine [258]    amLODIPine (NORVASC) 5 mg tablet     Sig: Take 1 tablet by mouth once daily     Dispense:  90 Tab     Refill:  1    aspirin delayed-release 81 mg tablet     Sig: Take 1 Tab by mouth daily. Dispense:  90 Tab     Refill:  1    lisinopril-hydroCHLOROthiazide (PRINZIDE, ZESTORETIC) 20-25 mg per tablet     Sig: Take 1 tablet by mouth once daily     Dispense:  90 Tab     Refill:  1    DISCONTD: sertraline (ZOLOFT) 50 mg tablet     Sig: Take 1 Tab by mouth daily. Dispense:  90 Tab     Refill:  1    venlafaxine-SR (EFFEXOR-XR) 37.5 mg capsule     Sig: Take 1 Cap by mouth daily. Dispense:  30 Cap     Refill:  5   1. Essential hypertension  Refill meds  - amLODIPine (NORVASC) 5 mg tablet; Take 1 tablet by mouth once daily  Dispense: 90 Tab;  Refill: 1  - lisinopril-hydroCHLOROthiazide (PRINZIDE, ZESTORETIC) 20-25 mg per tablet; Take 1 tablet by mouth once daily  Dispense: 90 Tab; Refill: 1    2. Recurrent major depressive disorder, remission status unspecified (HCC)  D/c sertraline  - venlafaxine-SR (EFFEXOR-XR) 37.5 mg capsule; Take 1 Cap by mouth daily. Dispense: 30 Cap; Refill: 5    3. Screen for STD (sexually transmitted disease)    - HIV 1/2 AG/AB, 4TH GENERATION,W RFLX CONFIRM  - T VAGINALIS AMPLIFICATION  - CHLAMYDIA/GC PCR    4. Morbid obesity (HCC)    - TSH REFLEX TO T4  - HEMOGLOBIN A1C WITH EAG  - LIPID PANEL  - METABOLIC PANEL, COMPREHENSIVE  - CBC WITH AUTOMATED DIFF    5. Encounter for immunization  Administered  - FLU (FLUAD QUAD INFLUENZA VACCINE,QUAD,ADJUVANTED)        There are no Patient Instructions on file for this visit. Follow-up and Dispositions    · Return in about 4 months (around 6/23/2021) for bp check. I have reviewed with the patient details of the assessment and plan and all questions were answered. Relevent patient education was performed. The most recent lab findings were reviewed with the patient. An After Visit Summary was printed and given to the patient.

## 2021-03-04 PROBLEM — R73.03 PREDIABETES: Status: ACTIVE | Noted: 2021-03-04

## 2021-03-04 LAB
ALBUMIN SERPL-MCNC: 4.4 G/DL (ref 4–5)
ALBUMIN/GLOB SERPL: 1.5 {RATIO} (ref 1.2–2.2)
ALP SERPL-CCNC: 51 IU/L (ref 39–117)
ALT SERPL-CCNC: 17 IU/L (ref 0–44)
AST SERPL-CCNC: 24 IU/L (ref 0–40)
BASOPHILS # BLD AUTO: 0.1 X10E3/UL (ref 0–0.2)
BASOPHILS NFR BLD AUTO: 1 %
BILIRUB SERPL-MCNC: 0.4 MG/DL (ref 0–1.2)
BUN SERPL-MCNC: 13 MG/DL (ref 6–20)
BUN/CREAT SERPL: 14 (ref 9–20)
C TRACH RRNA SPEC QL NAA+PROBE: NEGATIVE
CALCIUM SERPL-MCNC: 9.5 MG/DL (ref 8.7–10.2)
CHLORIDE SERPL-SCNC: 99 MMOL/L (ref 96–106)
CHOLEST SERPL-MCNC: 178 MG/DL (ref 100–199)
CO2 SERPL-SCNC: 27 MMOL/L (ref 20–29)
CREAT SERPL-MCNC: 0.92 MG/DL (ref 0.76–1.27)
EOSINOPHIL # BLD AUTO: 0.2 X10E3/UL (ref 0–0.4)
EOSINOPHIL NFR BLD AUTO: 2 %
ERYTHROCYTE [DISTWIDTH] IN BLOOD BY AUTOMATED COUNT: 14.1 % (ref 11.6–15.4)
EST. AVERAGE GLUCOSE BLD GHB EST-MCNC: 128 MG/DL
GLOBULIN SER CALC-MCNC: 3 G/DL (ref 1.5–4.5)
GLUCOSE SERPL-MCNC: 111 MG/DL (ref 65–99)
HBA1C MFR BLD: 6.1 % (ref 4.8–5.6)
HCT VFR BLD AUTO: 46.8 % (ref 37.5–51)
HDLC SERPL-MCNC: 42 MG/DL
HGB BLD-MCNC: 15.5 G/DL (ref 13–17.7)
HIV 1+2 AB+HIV1 P24 AG SERPL QL IA: NON REACTIVE
IMM GRANULOCYTES # BLD AUTO: 0 X10E3/UL (ref 0–0.1)
IMM GRANULOCYTES NFR BLD AUTO: 0 %
IMP & REVIEW OF LAB RESULTS: NORMAL
LDLC SERPL CALC-MCNC: 105 MG/DL (ref 0–99)
LYMPHOCYTES # BLD AUTO: 4.7 X10E3/UL (ref 0.7–3.1)
LYMPHOCYTES NFR BLD AUTO: 51 %
MCH RBC QN AUTO: 29.1 PG (ref 26.6–33)
MCHC RBC AUTO-ENTMCNC: 33.1 G/DL (ref 31.5–35.7)
MCV RBC AUTO: 88 FL (ref 79–97)
MONOCYTES # BLD AUTO: 1 X10E3/UL (ref 0.1–0.9)
MONOCYTES NFR BLD AUTO: 11 %
N GONORRHOEA RRNA SPEC QL NAA+PROBE: NEGATIVE
NEUTROPHILS # BLD AUTO: 3.3 X10E3/UL (ref 1.4–7)
NEUTROPHILS NFR BLD AUTO: 35 %
PLATELET # BLD AUTO: 205 X10E3/UL (ref 150–450)
POTASSIUM SERPL-SCNC: 3.9 MMOL/L (ref 3.5–5.2)
PROT SERPL-MCNC: 7.4 G/DL (ref 6–8.5)
RBC # BLD AUTO: 5.33 X10E6/UL (ref 4.14–5.8)
SODIUM SERPL-SCNC: 139 MMOL/L (ref 134–144)
T VAGINALIS DNA SPEC QL NAA+PROBE: NEGATIVE
TRIGL SERPL-MCNC: 178 MG/DL (ref 0–149)
TSH SERPL DL<=0.005 MIU/L-ACNC: 1.1 UIU/ML (ref 0.45–4.5)
VLDLC SERPL CALC-MCNC: 31 MG/DL (ref 5–40)
WBC # BLD AUTO: 9.3 X10E3/UL (ref 3.4–10.8)

## 2021-09-27 ENCOUNTER — APPOINTMENT (RX ONLY)
Dept: URBAN - METROPOLITAN AREA OTHER 11 | Facility: OTHER | Age: 36
Setting detail: DERMATOLOGY
End: 2021-09-27

## 2021-09-27 DIAGNOSIS — D22 MELANOCYTIC NEVI: ICD-10-CM

## 2021-09-27 DIAGNOSIS — D485 NEOPLASM OF UNCERTAIN BEHAVIOR OF SKIN: ICD-10-CM

## 2021-09-27 PROBLEM — D48.5 NEOPLASM OF UNCERTAIN BEHAVIOR OF SKIN: Status: ACTIVE | Noted: 2021-09-27

## 2021-09-27 PROBLEM — D22.39 MELANOCYTIC NEVI OF OTHER PARTS OF FACE: Status: ACTIVE | Noted: 2021-09-27

## 2021-09-27 PROCEDURE — ? COUNSELING

## 2021-09-27 PROCEDURE — 99202 OFFICE O/P NEW SF 15 MIN: CPT | Mod: 25

## 2021-09-27 PROCEDURE — ? BIOPSY BY SHAVE METHOD

## 2021-09-27 PROCEDURE — 11102 TANGNTL BX SKIN SINGLE LES: CPT

## 2021-09-27 ASSESSMENT — LOCATION DETAILED DESCRIPTION DERM
LOCATION DETAILED: LEFT CENTRAL BUCCAL CHEEK
LOCATION DETAILED: RIGHT SUPERIOR MEDIAL BUCCAL CHEEK

## 2021-09-27 ASSESSMENT — LOCATION SIMPLE DESCRIPTION DERM
LOCATION SIMPLE: LEFT CHEEK
LOCATION SIMPLE: RIGHT CHEEK

## 2021-09-27 ASSESSMENT — LOCATION ZONE DERM: LOCATION ZONE: FACE

## 2022-03-19 PROBLEM — R73.03 PREDIABETES: Status: ACTIVE | Noted: 2021-03-04

## 2022-03-19 PROBLEM — Z78.9 MALE-TO-FEMALE TRANSGENDER PERSON: Status: ACTIVE | Noted: 2019-09-23

## 2022-03-19 PROBLEM — R07.9 CHEST PAIN: Status: ACTIVE | Noted: 2017-07-05

## 2022-03-19 PROBLEM — I51.7 LVH (LEFT VENTRICULAR HYPERTROPHY): Status: ACTIVE | Noted: 2019-09-23

## 2022-03-19 PROBLEM — E66.01 MORBID OBESITY (HCC): Status: ACTIVE | Noted: 2019-09-23

## 2022-03-20 PROBLEM — I10 UNCONTROLLED HYPERTENSION: Status: ACTIVE | Noted: 2017-07-05

## 2022-04-19 ENCOUNTER — APPOINTMENT (OUTPATIENT)
Dept: GENERAL RADIOLOGY | Age: 37
DRG: 637 | End: 2022-04-19
Attending: EMERGENCY MEDICINE
Payer: COMMERCIAL

## 2022-04-19 ENCOUNTER — NURSE TRIAGE (OUTPATIENT)
Dept: OTHER | Facility: CLINIC | Age: 37
End: 2022-04-19

## 2022-04-19 ENCOUNTER — HOSPITAL ENCOUNTER (INPATIENT)
Age: 37
LOS: 3 days | Discharge: HOME | DRG: 637 | End: 2022-04-22
Attending: STUDENT IN AN ORGANIZED HEALTH CARE EDUCATION/TRAINING PROGRAM | Admitting: INTERNAL MEDICINE
Payer: COMMERCIAL

## 2022-04-19 DIAGNOSIS — E11.65 TYPE 2 DIABETES MELLITUS WITH HYPERGLYCEMIA, WITH LONG-TERM CURRENT USE OF INSULIN (HCC): ICD-10-CM

## 2022-04-19 DIAGNOSIS — Z79.4 TYPE 2 DIABETES MELLITUS WITH HYPERGLYCEMIA, WITH LONG-TERM CURRENT USE OF INSULIN (HCC): ICD-10-CM

## 2022-04-19 DIAGNOSIS — N17.9 AKI (ACUTE KIDNEY INJURY) (HCC): Primary | ICD-10-CM

## 2022-04-19 DIAGNOSIS — E11.00 HYPEROSMOLAR HYPERGLYCEMIC STATE (HHS) (HCC): ICD-10-CM

## 2022-04-19 PROBLEM — U07.1 COVID-19 VIRUS INFECTION: Status: ACTIVE | Noted: 2022-04-19

## 2022-04-19 LAB
ALBUMIN SERPL-MCNC: 3.2 G/DL (ref 3.5–5)
ALBUMIN/GLOB SERPL: 0.7 {RATIO} (ref 1.1–2.2)
ALP SERPL-CCNC: 103 U/L (ref 45–117)
ALT SERPL-CCNC: 74 U/L (ref 12–78)
ANION GAP SERPL CALC-SCNC: 10 MMOL/L (ref 5–15)
ANION GAP SERPL CALC-SCNC: 10 MMOL/L (ref 5–15)
AST SERPL-CCNC: 31 U/L (ref 15–37)
ATRIAL RATE: 99 BPM
BASOPHILS # BLD: 0 K/UL (ref 0–0.1)
BASOPHILS # BLD: 0 K/UL (ref 0–0.1)
BASOPHILS NFR BLD: 0 % (ref 0–1)
BASOPHILS NFR BLD: 1 % (ref 0–1)
BILIRUB SERPL-MCNC: 0.9 MG/DL (ref 0.2–1)
BUN SERPL-MCNC: 15 MG/DL (ref 6–20)
BUN SERPL-MCNC: 15 MG/DL (ref 6–20)
BUN/CREAT SERPL: 11 (ref 12–20)
BUN/CREAT SERPL: 8 (ref 12–20)
CALCIUM SERPL-MCNC: 8.8 MG/DL (ref 8.5–10.1)
CALCIUM SERPL-MCNC: 9 MG/DL (ref 8.5–10.1)
CALCULATED P AXIS, ECG09: 75 DEGREES
CALCULATED R AXIS, ECG10: 39 DEGREES
CALCULATED T AXIS, ECG11: -17 DEGREES
CHLORIDE SERPL-SCNC: 88 MMOL/L (ref 97–108)
CHLORIDE SERPL-SCNC: 94 MMOL/L (ref 97–108)
CO2 SERPL-SCNC: 24 MMOL/L (ref 21–32)
CO2 SERPL-SCNC: 25 MMOL/L (ref 21–32)
COMMENT, HOLDF: NORMAL
COVID-19 RAPID TEST, COVR: DETECTED
CREAT SERPL-MCNC: 1.36 MG/DL (ref 0.7–1.3)
CREAT SERPL-MCNC: 1.78 MG/DL (ref 0.7–1.3)
D DIMER PPP FEU-MCNC: 0.26 MG/L FEU (ref 0–0.65)
DIAGNOSIS, 93000: NORMAL
DIFFERENTIAL METHOD BLD: ABNORMAL
DIFFERENTIAL METHOD BLD: NORMAL
EOSINOPHIL # BLD: 0.1 K/UL (ref 0–0.4)
EOSINOPHIL # BLD: 0.1 K/UL (ref 0–0.4)
EOSINOPHIL NFR BLD: 1 % (ref 0–7)
EOSINOPHIL NFR BLD: 1 % (ref 0–7)
ERYTHROCYTE [DISTWIDTH] IN BLOOD BY AUTOMATED COUNT: 12.2 % (ref 11.5–14.5)
ERYTHROCYTE [DISTWIDTH] IN BLOOD BY AUTOMATED COUNT: 12.2 % (ref 11.5–14.5)
GLOBULIN SER CALC-MCNC: 4.7 G/DL (ref 2–4)
GLUCOSE BLD STRIP.AUTO-MCNC: >600 MG/DL (ref 65–117)
GLUCOSE SERPL-MCNC: 671 MG/DL (ref 65–100)
GLUCOSE SERPL-MCNC: 891 MG/DL (ref 65–100)
HCT VFR BLD AUTO: 44.6 % (ref 36.6–50.3)
HCT VFR BLD AUTO: 46.9 % (ref 36.6–50.3)
HGB BLD-MCNC: 16.1 G/DL (ref 12.1–17)
HGB BLD-MCNC: 16.5 G/DL (ref 12.1–17)
IMM GRANULOCYTES # BLD AUTO: 0 K/UL (ref 0–0.04)
IMM GRANULOCYTES # BLD AUTO: 0 K/UL (ref 0–0.04)
IMM GRANULOCYTES NFR BLD AUTO: 0 % (ref 0–0.5)
IMM GRANULOCYTES NFR BLD AUTO: 0 % (ref 0–0.5)
LYMPHOCYTES # BLD: 2.5 K/UL (ref 0.8–3.5)
LYMPHOCYTES # BLD: 3.3 K/UL (ref 0.8–3.5)
LYMPHOCYTES NFR BLD: 33 % (ref 12–49)
LYMPHOCYTES NFR BLD: 37 % (ref 12–49)
MCH RBC QN AUTO: 30.8 PG (ref 26–34)
MCH RBC QN AUTO: 30.9 PG (ref 26–34)
MCHC RBC AUTO-ENTMCNC: 35.2 G/DL (ref 30–36.5)
MCHC RBC AUTO-ENTMCNC: 36.1 G/DL (ref 30–36.5)
MCV RBC AUTO: 85.6 FL (ref 80–99)
MCV RBC AUTO: 87.5 FL (ref 80–99)
MONOCYTES # BLD: 0.7 K/UL (ref 0–1)
MONOCYTES # BLD: 0.8 K/UL (ref 0–1)
MONOCYTES NFR BLD: 9 % (ref 5–13)
MONOCYTES NFR BLD: 9 % (ref 5–13)
NEUTS SEG # BLD: 4.3 K/UL (ref 1.8–8)
NEUTS SEG # BLD: 4.8 K/UL (ref 1.8–8)
NEUTS SEG NFR BLD: 53 % (ref 32–75)
NEUTS SEG NFR BLD: 57 % (ref 32–75)
NRBC # BLD: 0 K/UL (ref 0–0.01)
NRBC # BLD: 0 K/UL (ref 0–0.01)
NRBC BLD-RTO: 0 PER 100 WBC
NRBC BLD-RTO: 0 PER 100 WBC
P-R INTERVAL, ECG05: 192 MS
PLATELET # BLD AUTO: 101 K/UL (ref 150–400)
PLATELET # BLD AUTO: 164 K/UL (ref 150–400)
POTASSIUM SERPL-SCNC: 3.8 MMOL/L (ref 3.5–5.1)
POTASSIUM SERPL-SCNC: 3.9 MMOL/L (ref 3.5–5.1)
PROT SERPL-MCNC: 7.9 G/DL (ref 6.4–8.2)
Q-T INTERVAL, ECG07: 362 MS
QRS DURATION, ECG06: 88 MS
QTC CALCULATION (BEZET), ECG08: 464 MS
RBC # BLD AUTO: 5.21 M/UL (ref 4.1–5.7)
RBC # BLD AUTO: 5.36 M/UL (ref 4.1–5.7)
SAMPLES BEING HELD,HOLD: NORMAL
SERVICE CMNT-IMP: ABNORMAL
SODIUM SERPL-SCNC: 123 MMOL/L (ref 136–145)
SODIUM SERPL-SCNC: 128 MMOL/L (ref 136–145)
SOURCE, COVRS: ABNORMAL
TROPONIN-HIGH SENSITIVITY: 35 NG/L (ref 0–76)
VENTRICULAR RATE, ECG03: 99 BPM
WBC # BLD AUTO: 7.6 K/UL (ref 4.1–11.1)
WBC # BLD AUTO: 9 K/UL (ref 4.1–11.1)

## 2022-04-19 PROCEDURE — 74011250637 HC RX REV CODE- 250/637: Performed by: STUDENT IN AN ORGANIZED HEALTH CARE EDUCATION/TRAINING PROGRAM

## 2022-04-19 PROCEDURE — 65660000001 HC RM ICU INTERMED STEPDOWN

## 2022-04-19 PROCEDURE — 74011636637 HC RX REV CODE- 636/637: Performed by: NURSE PRACTITIONER

## 2022-04-19 PROCEDURE — 85379 FIBRIN DEGRADATION QUANT: CPT

## 2022-04-19 PROCEDURE — 99285 EMERGENCY DEPT VISIT HI MDM: CPT

## 2022-04-19 PROCEDURE — 74011250637 HC RX REV CODE- 250/637: Performed by: INTERNAL MEDICINE

## 2022-04-19 PROCEDURE — 85025 COMPLETE CBC W/AUTO DIFF WBC: CPT

## 2022-04-19 PROCEDURE — 87635 SARS-COV-2 COVID-19 AMP PRB: CPT

## 2022-04-19 PROCEDURE — 93005 ELECTROCARDIOGRAM TRACING: CPT

## 2022-04-19 PROCEDURE — 82947 ASSAY GLUCOSE BLOOD QUANT: CPT

## 2022-04-19 PROCEDURE — 74011250636 HC RX REV CODE- 250/636: Performed by: STUDENT IN AN ORGANIZED HEALTH CARE EDUCATION/TRAINING PROGRAM

## 2022-04-19 PROCEDURE — 74011636637 HC RX REV CODE- 636/637: Performed by: INTERNAL MEDICINE

## 2022-04-19 PROCEDURE — 80053 COMPREHEN METABOLIC PANEL: CPT

## 2022-04-19 PROCEDURE — 74011250636 HC RX REV CODE- 250/636: Performed by: INTERNAL MEDICINE

## 2022-04-19 PROCEDURE — 71046 X-RAY EXAM CHEST 2 VIEWS: CPT

## 2022-04-19 PROCEDURE — 82962 GLUCOSE BLOOD TEST: CPT

## 2022-04-19 PROCEDURE — 84484 ASSAY OF TROPONIN QUANT: CPT

## 2022-04-19 RX ORDER — INSULIN GLARGINE 100 [IU]/ML
30 INJECTION, SOLUTION SUBCUTANEOUS
Status: COMPLETED | OUTPATIENT
Start: 2022-04-19 | End: 2022-04-19

## 2022-04-19 RX ORDER — ENOXAPARIN SODIUM 100 MG/ML
30 INJECTION SUBCUTANEOUS EVERY 12 HOURS
Status: DISCONTINUED | OUTPATIENT
Start: 2022-04-19 | End: 2022-04-20

## 2022-04-19 RX ORDER — LANOLIN ALCOHOL/MO/W.PET/CERES
3 CREAM (GRAM) TOPICAL
Status: DISCONTINUED | OUTPATIENT
Start: 2022-04-19 | End: 2022-04-22 | Stop reason: HOSPADM

## 2022-04-19 RX ORDER — MAGNESIUM SULFATE 100 %
4 CRYSTALS MISCELLANEOUS AS NEEDED
Status: DISCONTINUED | OUTPATIENT
Start: 2022-04-19 | End: 2022-04-22 | Stop reason: HOSPADM

## 2022-04-19 RX ORDER — AMLODIPINE BESYLATE 5 MG/1
5 TABLET ORAL DAILY
Status: DISCONTINUED | OUTPATIENT
Start: 2022-04-20 | End: 2022-04-22 | Stop reason: HOSPADM

## 2022-04-19 RX ORDER — INSULIN LISPRO 100 [IU]/ML
10 INJECTION, SOLUTION INTRAVENOUS; SUBCUTANEOUS ONCE
Status: COMPLETED | OUTPATIENT
Start: 2022-04-19 | End: 2022-04-19

## 2022-04-19 RX ORDER — INSULIN LISPRO 100 [IU]/ML
INJECTION, SOLUTION INTRAVENOUS; SUBCUTANEOUS EVERY 4 HOURS
Status: DISCONTINUED | OUTPATIENT
Start: 2022-04-19 | End: 2022-04-20

## 2022-04-19 RX ORDER — ONDANSETRON 2 MG/ML
4 INJECTION INTRAMUSCULAR; INTRAVENOUS
Status: DISCONTINUED | OUTPATIENT
Start: 2022-04-19 | End: 2022-04-22 | Stop reason: HOSPADM

## 2022-04-19 RX ORDER — VENLAFAXINE HYDROCHLORIDE 37.5 MG/1
37.5 CAPSULE, EXTENDED RELEASE ORAL DAILY
Status: DISCONTINUED | OUTPATIENT
Start: 2022-04-20 | End: 2022-04-22 | Stop reason: HOSPADM

## 2022-04-19 RX ORDER — ONDANSETRON 4 MG/1
4 TABLET, ORALLY DISINTEGRATING ORAL
Status: COMPLETED | OUTPATIENT
Start: 2022-04-19 | End: 2022-04-19

## 2022-04-19 RX ORDER — SODIUM CHLORIDE, SODIUM LACTATE, POTASSIUM CHLORIDE, CALCIUM CHLORIDE 600; 310; 30; 20 MG/100ML; MG/100ML; MG/100ML; MG/100ML
150 INJECTION, SOLUTION INTRAVENOUS CONTINUOUS
Status: DISCONTINUED | OUTPATIENT
Start: 2022-04-19 | End: 2022-04-19

## 2022-04-19 RX ORDER — ACETAMINOPHEN 325 MG/1
650 TABLET ORAL
Status: DISCONTINUED | OUTPATIENT
Start: 2022-04-19 | End: 2022-04-22 | Stop reason: HOSPADM

## 2022-04-19 RX ORDER — ASPIRIN 81 MG/1
81 TABLET ORAL DAILY
Status: DISCONTINUED | OUTPATIENT
Start: 2022-04-20 | End: 2022-04-22 | Stop reason: HOSPADM

## 2022-04-19 RX ORDER — SODIUM CHLORIDE 9 MG/ML
100 INJECTION, SOLUTION INTRAVENOUS CONTINUOUS
Status: DISCONTINUED | OUTPATIENT
Start: 2022-04-19 | End: 2022-04-22 | Stop reason: HOSPADM

## 2022-04-19 RX ADMIN — Medication 3 MG: at 21:57

## 2022-04-19 RX ADMIN — ENOXAPARIN SODIUM 30 MG: 100 INJECTION SUBCUTANEOUS at 21:57

## 2022-04-19 RX ADMIN — SODIUM CHLORIDE, POTASSIUM CHLORIDE, SODIUM LACTATE AND CALCIUM CHLORIDE 1000 ML: 600; 310; 30; 20 INJECTION, SOLUTION INTRAVENOUS at 14:23

## 2022-04-19 RX ADMIN — ONDANSETRON 4 MG: 4 TABLET, ORALLY DISINTEGRATING ORAL at 13:51

## 2022-04-19 RX ADMIN — Medication 7 UNITS: at 17:24

## 2022-04-19 RX ADMIN — SODIUM CHLORIDE 150 ML/HR: 9 INJECTION, SOLUTION INTRAVENOUS at 17:22

## 2022-04-19 RX ADMIN — Medication 10 UNITS: at 23:43

## 2022-04-19 RX ADMIN — SODIUM CHLORIDE 1000 ML: 9 INJECTION, SOLUTION INTRAVENOUS at 17:25

## 2022-04-19 RX ADMIN — INSULIN GLARGINE 30 UNITS: 100 INJECTION, SOLUTION SUBCUTANEOUS at 17:24

## 2022-04-19 NOTE — ROUTINE PROCESS
TRANSFER - OUT REPORT:    Verbal report given to Cherie SOLIZ(name) on Tari Resides  being transferred to 419(unit) for routine progression of care       Report consisted of patients Situation, Background, Assessment and   Recommendations(SBAR). Information from the following report(s) SBAR, Kardex, ED Summary, [facility] and Recent Results was reviewed with the receiving nurse. Lines:   Peripheral IV 04/19/22 Right Antecubital (Active)   Site Assessment Clean, dry, & intact 04/19/22 1332   Phlebitis Assessment 0 04/19/22 1332   Infiltration Assessment 0 04/19/22 1332   Dressing Status Clean, dry, & intact 04/19/22 1332   Dressing Type Transparent 04/19/22 1332   Hub Color/Line Status Pink 04/19/22 1332   Action Taken Blood drawn 04/19/22 1332   Alcohol Cap Used Yes 04/19/22 1332        Opportunity for questions and clarification was provided.       Patient transported with:   [employer]

## 2022-04-19 NOTE — TELEPHONE ENCOUNTER
Received call from JOSE ROBERTO at [facility] with [employer] Complaint. Subjective: Caller states \"I tested positive for COVID last Monday\"     Current Symptoms: Feeling better today. Still having a hard time breathing. SOB at time of call. Chest was hurting last night. Slept 2 hours last night. Since having COVID has to breathe with her mouth open. Feels weak. Body aches. Onset: 8 days ago; unchanged, improving    Associated Symptoms: reduced appetite, increased wakefulness    Pain Severity: 0/10; ;     Temperature: Feels warm     What has been tried:     LMP:  Pregnant:     Recommended disposition: Go to ED Now    Care advice provided, patient verbalizes understanding; denies any other questions or concerns; instructed to call back for any new or worsening symptoms. Patient/caller agrees to proceed to [facility]    Attention Provider: Thank you for allowing me to participate in the care of your patient. The patient was connected to triage in response to information provided to the [facility]. Please do not respond through this encounter as the response is not directed to a shared pool.       Reason for Disposition   Difficulty breathing and within 14 days of COVID-19 Exposure   COVID-19 lab test positive   MODERATE difficulty breathing (e.g., speaks in phrases, SOB even at rest, pulse 100-120)    Protocols used: BREATHING DIFFICULTY-ADULT-[place], CORONAVIRUS (COVID-19) EXPOSURE-ADULT-[place], CORONAVIRUS (COVID-19) DIAGNOSED OR SUSPECTED-ADULT-[place]

## 2022-04-19 NOTE — ED PROVIDER NOTES
Patient presents with COVID symptoms, positive test on last Monday, symptoms started 5 days before that. Now has CP, shortness of breath, N/V, feels dehydrated even with high fluid intake. Patient states she is doing mouth breathing because her nose is congested. The history is provided by the patient and medical records. Positive For Covid-19  Max temp prior to arrival:  Within normal limits  Temp source:  Subjective  Onset quality:  Gradual  Timing:  Constant  Progression:  Worsening  Chronicity:  New  Relieved by:  Nothing  Worsened by:  Exertion  Ineffective treatments:  Acetaminophen, electrolyte drinks and ibuprofen  Associated symptoms: chest pain, myalgias and nausea    Associated symptoms comment:  Shortness of breath  Risk factors: recent sickness, recent travel and sick contacts         Past Medical History:   Diagnosis Date    Depression     Hypertension        History reviewed. No pertinent surgical history.       Family History:   Problem Relation Age of Onset    Hypertension Mother     Hypertension Father        Social History     Socioeconomic History    Marital status: SINGLE     Spouse name: Not on file    Number of children: Not on file    Years of education: Not on file    Highest education level: Not on file   Occupational History    Not on file   Tobacco Use    Smoking status: Former Smoker    Smokeless tobacco: Never Used   Vaping Use    Vaping Use: Never used   Substance and Sexual Activity    Alcohol use: Yes     Comment: socially    Drug use: No     Types: Marijuana    Sexual activity: Not Currently   Other Topics Concern    Not on file   Social History Narrative    Not on file     Social Determinants of Health     Financial Resource Strain:     Difficulty of Paying Living Expenses: Not on file   Food Insecurity:     Worried About [address] in the Last Year: Not on file    [address] in the Last Year: Not on file   Transportation Needs:     Lack of Transportation (Medical): Not on file    Lack of Transportation (Non-Medical): Not on file   Physical Activity:     Days of Exercise per Week: Not on file    Minutes of Exercise per Session: Not on file   Stress:     Feeling of Stress : Not on file   Social Connections:     Frequency of Communication with Friends and Family: Not on file    Frequency of Social Gatherings with Friends and Family: Not on file    Attends Hindu Services: Not on file    Active Member of [address] [place] [address] or Organizations: Not on file    Attends Club or Organization Meetings: Not on file    Marital Status: Not on file   Intimate Partner Violence:     Fear of Current or Ex-Partner: Not on file    Emotionally Abused: Not on file    Physically Abused: Not on file    Sexually Abused: Not on file   Housing Stability:     Unable to Pay for Housing in the Last Year: Not on file    Number of [place] in the Last Year: Not on file    Unstable Housing in the Last Year: Not on file         ALLERGIES: Patient has no known allergies. Review of Systems   Constitutional: Positive for activity change, appetite change and fatigue. HENT: Negative. Eyes: Negative. Respiratory: Positive for shortness of breath. Cardiovascular: Positive for chest pain. Gastrointestinal: Positive for nausea. Endocrine: Negative. Genitourinary: Negative. Musculoskeletal: Positive for myalgias. Skin: Negative. Allergic/Immunologic: Negative. Neurological: Negative. Hematological: Negative. Psychiatric/Behavioral: Negative. Vitals:    04/19/22 1240   BP: 113/76   Pulse: 99   Resp: 18   Temp: 98.6 °F (37 °C)   SpO2: 96%            Physical Exam  Vitals and nursing note reviewed. Constitutional:       General: He is not in acute distress. Appearance: Normal appearance. He is obese. He is ill-appearing. HENT:      Head: Normocephalic and atraumatic.       Right Ear: External ear normal.      Left Ear: External ear normal.      Nose: Nose normal.   Eyes:      Extraocular Movements: Extraocular movements intact. Conjunctiva/sclera: Conjunctivae normal.   Cardiovascular:      Rate and Rhythm: Normal rate. Pulses: Normal pulses. Radial pulses are 2+ on the right side and 2+ on the left side. Heart sounds: Normal heart sounds. Pulmonary:      Effort: Pulmonary effort is normal.      Breath sounds: Normal breath sounds. Chest:      Chest wall: No deformity or tenderness. Abdominal:      General: Abdomen is flat. There is no distension. Tenderness: There is no abdominal tenderness. Musculoskeletal:         General: No deformity or signs of injury. Normal range of motion. Cervical back: Normal range of motion and neck supple. No tenderness. Skin:     General: Skin is warm and dry. Capillary Refill: Capillary refill takes less than 2 seconds. Neurological:      Mental Status: He is alert and oriented to person, place, and time. Psychiatric:         Attention and Perception: Attention normal.         Mood and Affect: Mood normal.         Behavior: Behavior normal.          [facility]  ED Course as of 04/19/22 1449   Tue Apr 19, 2022   1345 EKG interpretation:   Rhythm: normal sinus rhythm and PAC's; and regular . Rate (approx.): 99; Axis: normal; Intervals: normal ; ST/T wave: non-specific changes; EKG documented and interpreted by Elaine Cadena.  Reanna Roche MD, Emergency Medicine.   [AL]      ED Course User Index  [AL] Jack Yancey MD     LABORATORY RESULTS:  Labs Reviewed   METABOLIC PANEL, COMPREHENSIVE - Abnormal; Notable for the following components:       Result Value    Sodium 123 (*)     Chloride 88 (*)     Glucose 891 (*)     Creatinine 1.78 (*)     BUN/Creatinine ratio 8 (*)     GFR est AA 52 (*)     GFR est non-AA 43 (*)     Albumin 3.2 (*)     Globulin 4.7 (*)     A-G Ratio 0.7 (*)     All other components within normal limits   CBC WITH AUTOMATED DIFF   SAMPLES BEING HELD   D DIMER TROPONIN-HIGH SENSITIVITY       IMAGING RESULTS:  XR CHEST PA LAT   Final Result   No acute intrathoracic disease. MEDICATIONS GIVEN:  Medications   lactated ringers bolus infusion 1,000 mL (1,000 mL IntraVENous New Bag 4/19/22 1423)   ondansetron (ZOFRAN ODT) tablet 4 mg (4 mg Oral Given 4/19/22 1351)       Differential diagnosis: COVID-19 symptoms, chest pain, ACS, PE, dehydration, electrolyte abnormality, nausea, URI, pneumonia    ED physician interpretation of imaging: Chest x-ray without focal pneumonia  ED physician interpretation of EKG: No STEMI. See my interpretation EKG and ED course above. ED physician interpretation of laboratory results: Hyperglycemia 891, patient has no history of diabetes, suspect acute insult due to COVID infection causing endocrine dysfunction. No anion gap. Patient also has KARLA with elevated serum creatinine. D-dimer within normal limits, doubt PE. Troponin at 35. MDM: Patient is a 23-year-old presenting to the ED you feeling poorly for approximately 10 to 12 days with COVID-positive test a week ago Monday. She found to have severe hyperglycemia as well as KARLA requiring hospitalization for further treatment and evaluation. DISPOSITION: Admitted    Perfect Serve Consult for Admission  2:45 PM    ED Room Number: ER07/07  Patient Name and age:  Clarice Camejo 43 y.o.  male  Working Diagnosis:   1. KARLA (acute kidney injury) ([facility]    2. Hyperosmolar hyperglycemic state (HHS) ([facility]        COVID-19 Suspicion:  yes  Sepsis present:  no  Reassessment needed: no  Code Status:  Full Code  Readmission: no  Isolation Requirements:  no  Recommended Level of Care:  med/surg  Department:  [facility] Adult ED - 21     Other: COVID symptoms for approximately 12 days. COVID-positive on Monday 11. Patient is vaccinated but not boosted. No coughing or respiratory symptoms. Chest x-ray clear. D-dimer within normal limits.   Elevation in troponin at 35 but not above normal window. Jade Baldwin.  Andrew Gonzalez MD        Procedures

## 2022-04-19 NOTE — PROGRESS NOTES
Report received from [facility] at this time. No COVID swab has been collected although per pt was positive 2 Days ago. Blood glucose 891 at 1330. Hospitalist paged to confirm orders for glucose.

## 2022-04-19 NOTE — H&P
[address] [facility] Adult  Hospitalist Group  History and Physical    Date of Service:  4/19/2022  Primary Care Provider: Kishor Marie MD  Source of information: The patient and Chart review    Chief Complaint: Positive For Covid-19      History of Presenting Illness:   Vinny Marcum is a 43 y.o. male who presents with fatigue, dryness as in constant thrist; found to have bs 900, recent + covid ( 6 days ago +/-) and no h/o DM and not in her parents  Pt is obese sedentary  As risk and else has htn, no exacerbating nor relieving factors, mild nausea, w vomiting  Early am but currently hungry. Mi Cazares; The patient denies any headache, blurry vision, sore throat, trouble swallowing, trouble with speech, chest pain, SOB, cough, fever, chills,     constipation, recent travels, sick contacts, focal or generalized neurological symptoms, falls, injuries, rashes,   hematemesis, melena, hemoptysis, hematuria, rashes, denies starting any new medications and denies any other concerns or problems besides as mentioned above. REVIEW OF SYSTEMS:  A comprehensive review of systems was negative except for that written in the History of Present Illness. Past Medical History:   Diagnosis Date    Depression     Hypertension       History reviewed. No pertinent surgical history. Prior to Admission medications    Medication Sig Start Date End Date Taking? Authorizing Provider   lisinopril-hydroCHLOROthiazide (PRINZIDE, ZESTORETIC) 20-25 mg per tablet Take 1 tablet by mouth once daily 10/1/21   Kishor Marie MD   amLODIPine (NORVASC) 5 mg tablet Take 1 tablet by mouth once daily 10/1/21   Kishor Marie MD   aspirin delayed-release 81 mg tablet Take 1 Tab by mouth daily. 2/23/21   Kishor Marie MD   venlafaxine-SR [facility] 37.5 mg capsule Take 1 Cap by mouth daily.  2/23/21   Kishor Marie MD     No Known Allergies   Family History   Problem Relation Age of Onset    Hypertension Mother     Hypertension Father       Social History:  reports that he has quit smoking. He has never used smokeless tobacco. He reports current alcohol use. He reports that he does not use drugs. Family and social history were personally reviewed, all pertinent and relevant details are outlined as above. Objective:     Visit Vitals  /76 (BP 1 Location: Right upper arm, BP Patient Position: At rest)   Pulse 99   Temp 98.6 °F (37 °C)   Resp 18   SpO2 96%      O2 Device: None (Room air)    PHYSICAL EXAM:   General: Alert x oriented x 3, awake, no acute distress, resting in bed, pleasant female appears to be stated age  HEENT: PEERL, EOMI, moist mucus membranes  Neck: Supple, no JVD, no meningeal signs  Chest: Clear to auscultation bilaterally   CVS: RRR, S1 S2 heard, no murmurs/rubs/gallops  Abd: Soft, non-tender, non-distended, +bowel sounds   Ext: No clubbing, no cyanosis, no edema  Neuro/Psych: Pleasant mood and affect, CN 2-12 grossly intact, sensory grossly within normal limit, Strength 5/5 in all extremities, DTR 1+ x 4  Cap refill: Brisk, less than 3 seconds  Pulses: 2+, symmetric in all extremities  Skin: Warm, dry, without rashes or lesions    Data Review: All diagnostic labs and studies have been reviewed. Abnormal Labs Reviewed   METABOLIC PANEL, COMPREHENSIVE - Abnormal; Notable for the following components:       Result Value    Sodium 123 (*)     Chloride 88 (*)     Glucose 891 (*)     Creatinine 1.78 (*)     BUN/Creatinine ratio 8 (*)     GFR est AA 52 (*)     GFR est non-AA 43 (*)     Albumin 3.2 (*)     Globulin 4.7 (*)     A-G Ratio 0.7 (*)     All other components within normal limits       All Micro Results     None          IMAGING:   XR CHEST PA LAT   Final Result   No acute intrathoracic disease.               ECG/ECHO:    Results for orders placed or performed during the hospital encounter of 04/19/22   EKG, 12 LEAD, INITIAL   Result Value Ref Range    Ventricular Rate 99 BPM    Atrial Rate 99 BPM    P-R Interval 192 ms    QRS Duration 88 ms    Q-T Interval 362 ms    QTC Calculation (Bezet) 464 ms    Calculated P Axis 75 degrees    Calculated R Axis 39 degrees    Calculated T Axis -17 degrees    Diagnosis       Sinus rhythm with premature atrial complexes  ST & T wave abnormality, consider inferolateral ischemia  Prolonged QT  Abnormal ECG  When compared with ECG of 05-JUL-2017 17:55,  T wave inversion now evident in Anterior leads  T wave inversion less evident in Lateral leads          Assessment:   Given the patient's current clinical presentation, there is a high level of concern for decompensation if discharged from the emergency department. Complex decision making was performed, which includes reviewing the patient's available past medical records, laboratory results, and imaging studies. Active Problems:    COVID-19 virus infection (4/19/2022)    non ketotic hyperosmolar state  New onset DM   Obesity   htn   Hyponatremia   Plan:   See orders  q 4 h poc bs's   SSI for above  Lantus 30 units now  Aggressive ivf, as S Na low bolus NS and cont NS ivf;s   a1c  Lipid panel  Act ad darleen          DIET: ADULT DIET Regular   ISOLATION PRECAUTIONS: There are currently no Active Isolations  CODE STATUS: Prior   DVT PROPHYLAXIS: Lovenox  FUNCTIONAL STATUS PRIOR TO HOSPITALIZATION: Fully active and ambulatory; able to carry on all self-care without restriction. EARLY MOBILITY ASSESSMENT: Recommend routine ambulation while hospitalized with the assistance of nursing staff  ANTICIPATED DISCHARGE: 24-48 hours. EMERGENCY CONTACT/SURROGATE DECISION MAKER: RONNA     CRITICAL CARE WAS PERFORMED FOR THIS ENCOUNTER: NO.      Signed By: Kerline Mendez MD     April 19, 2022         Please note that this dictation may have been completed with Dragon, the Delectable voice recognition software.   Quite often unanticipated grammatical, syntax, homophones, and other interpretive errors are inadvertently transcribed by the computer software. Please disregard these errors. Please excuse any errors that have escaped final proofreading.

## 2022-04-20 LAB
ANION GAP SERPL CALC-SCNC: 7 MMOL/L (ref 5–15)
BASOPHILS # BLD: 0 K/UL (ref 0–0.1)
BASOPHILS NFR BLD: 0 % (ref 0–1)
BUN SERPL-MCNC: 15 MG/DL (ref 6–20)
BUN/CREAT SERPL: 15 (ref 12–20)
CALCIUM SERPL-MCNC: 7.7 MG/DL (ref 8.5–10.1)
CHLORIDE SERPL-SCNC: 100 MMOL/L (ref 97–108)
CHOLEST SERPL-MCNC: 126 MG/DL
CO2 SERPL-SCNC: 26 MMOL/L (ref 21–32)
CREAT SERPL-MCNC: 0.98 MG/DL (ref 0.7–1.3)
CRP SERPL-MCNC: 0.51 MG/DL (ref 0–0.6)
D DIMER PPP FEU-MCNC: 0.36 MG/L FEU (ref 0–0.65)
DIFFERENTIAL METHOD BLD: ABNORMAL
EOSINOPHIL # BLD: 0.1 K/UL (ref 0–0.4)
EOSINOPHIL NFR BLD: 2 % (ref 0–7)
ERYTHROCYTE [DISTWIDTH] IN BLOOD BY AUTOMATED COUNT: 12.2 % (ref 11.5–14.5)
EST. AVERAGE GLUCOSE BLD GHB EST-MCNC: 292 MG/DL
GLUCOSE BLD STRIP.AUTO-MCNC: 297 MG/DL (ref 65–117)
GLUCOSE BLD STRIP.AUTO-MCNC: 352 MG/DL (ref 65–117)
GLUCOSE BLD STRIP.AUTO-MCNC: 442 MG/DL (ref 65–117)
GLUCOSE SERPL-MCNC: 336 MG/DL (ref 65–100)
GLUCOSE SERPL-MCNC: 671 MG/DL (ref 65–100)
HBA1C MFR BLD: 11.8 % (ref 4–5.6)
HCT VFR BLD AUTO: 35.2 % (ref 36.6–50.3)
HDLC SERPL-MCNC: 22 MG/DL
HDLC SERPL: 5.7 {RATIO} (ref 0–5)
HGB BLD-MCNC: 12.4 G/DL (ref 12.1–17)
IMM GRANULOCYTES # BLD AUTO: 0 K/UL (ref 0–0.04)
IMM GRANULOCYTES NFR BLD AUTO: 0 % (ref 0–0.5)
LDLC SERPL CALC-MCNC: 77.4 MG/DL (ref 0–100)
LYMPHOCYTES # BLD: 2.9 K/UL (ref 0.8–3.5)
LYMPHOCYTES NFR BLD: 42 % (ref 12–49)
MCH RBC QN AUTO: 31 PG (ref 26–34)
MCHC RBC AUTO-ENTMCNC: 35.2 G/DL (ref 30–36.5)
MCV RBC AUTO: 88 FL (ref 80–99)
MONOCYTES # BLD: 0.8 K/UL (ref 0–1)
MONOCYTES NFR BLD: 12 % (ref 5–13)
NEUTS SEG # BLD: 3 K/UL (ref 1.8–8)
NEUTS SEG NFR BLD: 44 % (ref 32–75)
NRBC # BLD: 0 K/UL (ref 0–0.01)
NRBC BLD-RTO: 0 PER 100 WBC
PLATELET # BLD AUTO: 109 K/UL (ref 150–400)
POTASSIUM SERPL-SCNC: 3.2 MMOL/L (ref 3.5–5.1)
RBC # BLD AUTO: 4 M/UL (ref 4.1–5.7)
RBC MORPH BLD: ABNORMAL
SERVICE CMNT-IMP: ABNORMAL
SODIUM SERPL-SCNC: 133 MMOL/L (ref 136–145)
TRIGL SERPL-MCNC: 133 MG/DL (ref ?–150)
VLDLC SERPL CALC-MCNC: 26.6 MG/DL
WBC # BLD AUTO: 6.8 K/UL (ref 4.1–11.1)

## 2022-04-20 PROCEDURE — 80061 LIPID PANEL: CPT

## 2022-04-20 PROCEDURE — 36415 COLL VENOUS BLD VENIPUNCTURE: CPT

## 2022-04-20 PROCEDURE — 85025 COMPLETE CBC W/AUTO DIFF WBC: CPT

## 2022-04-20 PROCEDURE — 74011250637 HC RX REV CODE- 250/637: Performed by: INTERNAL MEDICINE

## 2022-04-20 PROCEDURE — 74011636637 HC RX REV CODE- 636/637: Performed by: NURSE PRACTITIONER

## 2022-04-20 PROCEDURE — 83036 HEMOGLOBIN GLYCOSYLATED A1C: CPT

## 2022-04-20 PROCEDURE — 65660000001 HC RM ICU INTERMED STEPDOWN

## 2022-04-20 PROCEDURE — 82962 GLUCOSE BLOOD TEST: CPT

## 2022-04-20 PROCEDURE — 85379 FIBRIN DEGRADATION QUANT: CPT

## 2022-04-20 PROCEDURE — 86140 C-REACTIVE PROTEIN: CPT

## 2022-04-20 PROCEDURE — 80048 BASIC METABOLIC PNL TOTAL CA: CPT

## 2022-04-20 PROCEDURE — 74011636637 HC RX REV CODE- 636/637: Performed by: INTERNAL MEDICINE

## 2022-04-20 PROCEDURE — 74011250636 HC RX REV CODE- 250/636: Performed by: INTERNAL MEDICINE

## 2022-04-20 RX ORDER — INSULIN LISPRO 100 [IU]/ML
10 INJECTION, SOLUTION INTRAVENOUS; SUBCUTANEOUS
Status: DISCONTINUED | OUTPATIENT
Start: 2022-04-20 | End: 2022-04-21

## 2022-04-20 RX ORDER — INSULIN LISPRO 100 [IU]/ML
7 INJECTION, SOLUTION INTRAVENOUS; SUBCUTANEOUS ONCE
Status: DISCONTINUED | OUTPATIENT
Start: 2022-04-20 | End: 2022-04-20

## 2022-04-20 RX ORDER — INSULIN GLARGINE 100 [IU]/ML
0.3 INJECTION, SOLUTION SUBCUTANEOUS DAILY
Status: DISCONTINUED | OUTPATIENT
Start: 2022-04-20 | End: 2022-04-21

## 2022-04-20 RX ORDER — INSULIN LISPRO 100 [IU]/ML
7 INJECTION, SOLUTION INTRAVENOUS; SUBCUTANEOUS ONCE
Status: COMPLETED | OUTPATIENT
Start: 2022-04-20 | End: 2022-04-20

## 2022-04-20 RX ORDER — ENOXAPARIN SODIUM 100 MG/ML
40 INJECTION SUBCUTANEOUS EVERY 12 HOURS
Status: DISCONTINUED | OUTPATIENT
Start: 2022-04-20 | End: 2022-04-22 | Stop reason: HOSPADM

## 2022-04-20 RX ORDER — INSULIN LISPRO 100 [IU]/ML
INJECTION, SOLUTION INTRAVENOUS; SUBCUTANEOUS
Status: DISCONTINUED | OUTPATIENT
Start: 2022-04-20 | End: 2022-04-20

## 2022-04-20 RX ORDER — INSULIN LISPRO 100 [IU]/ML
INJECTION, SOLUTION INTRAVENOUS; SUBCUTANEOUS
Status: DISCONTINUED | OUTPATIENT
Start: 2022-04-20 | End: 2022-04-22 | Stop reason: HOSPADM

## 2022-04-20 RX ORDER — INSULIN GLARGINE 100 [IU]/ML
30 INJECTION, SOLUTION SUBCUTANEOUS DAILY
Status: DISCONTINUED | OUTPATIENT
Start: 2022-04-20 | End: 2022-04-20

## 2022-04-20 RX ADMIN — Medication 7 UNITS: at 18:10

## 2022-04-20 RX ADMIN — AMLODIPINE BESYLATE 5 MG: 5 TABLET ORAL at 10:14

## 2022-04-20 RX ADMIN — Medication 7 UNITS: at 05:34

## 2022-04-20 RX ADMIN — Medication 10 UNITS: at 14:20

## 2022-04-20 RX ADMIN — Medication 10 UNITS: at 18:10

## 2022-04-20 RX ADMIN — SODIUM CHLORIDE 150 ML/HR: 9 INJECTION, SOLUTION INTRAVENOUS at 00:58

## 2022-04-20 RX ADMIN — ENOXAPARIN SODIUM 30 MG: 100 INJECTION SUBCUTANEOUS at 10:14

## 2022-04-20 RX ADMIN — VENLAFAXINE HYDROCHLORIDE 37.5 MG: 37.5 CAPSULE, EXTENDED RELEASE ORAL at 10:14

## 2022-04-20 RX ADMIN — INSULIN GLARGINE 46 UNITS: 100 INJECTION, SOLUTION SUBCUTANEOUS at 10:14

## 2022-04-20 RX ADMIN — Medication 10 UNITS: at 14:21

## 2022-04-20 RX ADMIN — ASPIRIN 81 MG: 81 TABLET, COATED ORAL at 10:13

## 2022-04-20 NOTE — PROGRESS NOTES
Reason for Admission:  Admitted from home with complaints of fatigue, dry mouth. Found to have glucose level 900. Also Covid on admission. RUR Score:  5%-Low                   Plan for utilizing home health: Independent prior to admit no skilled needs identified at this time. PCP: First and Last name:  Liat Guzman MD  Are you a current patient: Yes/No: YES  Approximate date of last visit: 3/2022  Can you participate in a virtual visit with your PCP: YES                  Current Advanced Directive/Advance Care Plan:   Healthcare Decision Maker:             Primary Decision Maker: Gi Looney - Sister - [phone]                  Transition of Care Plan:   New diabetic will need teaching prior to discharge and follow up with Diabetic treatment team. Once medically stable will discharge home with no skilled needs. Care Management Interventions  PCP Verified by CM:  Yes  Last Visit to PCP: 03/09/22  Mode of Transport at Discharge:  (Family transport)  Support Systems: Other Family Member(s) (Sister Maureen [facility])  The Patient and/or Patient Representative was Provided with a Choice of Provider and Agrees with the Discharge Plan?:  ([address])  [employer] Information Provided?: No  Discharge Location  Patient Expects to be Discharged to[de-identified] Home

## 2022-04-20 NOTE — DISCHARGE INSTR - DIET

## 2022-04-20 NOTE — PROGRESS NOTES
[facility] Adult  Hospitalist Group                                                                                          Hospitalist Progress Note  Bruno Arango MD  Answering service: [phone] or [phone] from in house phone        Date of Service:  2022  NAME:  Vinny Marcum  :  1985  MRN:  [id number]      Admission Summary:   Copied form admit: \" Vinny Marcum is a 43 y.o. male who presents with fatigue, dryness as in constant thrist; found to have bs 900, recent + covid ( 6 days ago +/-) and no h/o DM and not in her parents  Pt is obese sedentary  As risk and else has htn, no exacerbating nor relieving factors, mild nausea, w vomiting  Early am but currently hungry. Mi Cazares;       The patient denies any headache, blurry vision, sore throat, trouble swallowing, trouble with speech, chest pain, SOB, cough, fever, chills,     constipation, recent travels, sick contacts, focal or generalized neurological symptoms, falls, injuries, rashes,   hematemesis, melena, hemoptysis, hematuria, rashes, denies starting any new medications and denies any other concerns or problems besides as mentioned above. \"    Interval history / Subjective:     2022 :    Constipated, else no complaints   Learning about her diabetes   Certified Clinical Nurse Specialist for DM following - appreciated   Amy Villa as below        Assessment & Plan: Active Problems:    COVID-19 virus infection (2022)     non ketotic hyperosmolar state - resolving  New onset DM -  meds adjustments on going   Morbid Obesity   Body mass index is 48.8 kg/m². HTN; treat as indicated    Hyponatremia  NS iv bolus/iv, monitor     DIET: ADULT DIET dm reg  ISOLATION PRECAUTIONS: There are currently no Active Isolations  CODE STATUS: Prior   DVT PROPHYLAXIS: Lovenox  FUNCTIONAL STATUS PRIOR TO HOSPITALIZATION: Fully active and ambulatory; able to carry on all self-care without restriction.   EARLY MOBILITY ASSESSMENT: Recommend routine ambulation while hospitalized with the assistance of nursing staff  ANTICIPATED DISCHARGE: 24-48 hours. EMERGENCY CONTACT/SURROGATE DECISION MAKER: MEKHI        Hospital Problems  Date Reviewed: 2/23/2021          Codes Class Noted POA    COVID-19 virus infection ICD-10-CM: U07.1  ICD-9-CM: 079.89  4/19/2022 Unknown                  After personally interviewing pt at bedside the following is noted on 4/20/2022 :    Review of Systems   Constitutional: Negative for chills and fever. Respiratory: Negative for cough, shortness of breath and wheezing. Gastrointestinal: Positive for constipation. All other systems reviewed and are negative. I had a face to face encounter with patient on 4/20/2022 at bedside for the following physical exam:     PHYSICAL EXAM:    Visit Vitals  /66 (BP 1 Location: Left upper arm, BP Patient Position: At rest;Sitting)   Pulse 95   Temp 99.1 °F (37.3 °C)   Resp 29   Ht 5' 10\" (1.778 m)   Wt 154.3 kg (340 lb 1.6 oz)   SpO2 96%   BMI 48.80 kg/m²          Physical Exam  Constitutional:       General: She is not in acute distress. Appearance: She is obese. She is not ill-appearing, toxic-appearing or diaphoretic. HENT:      Head: Normocephalic and atraumatic. Right Ear: External ear normal.      Left Ear: External ear normal.      Nose: Nose normal.      Mouth/Throat:      Mouth: Mucous membranes are moist.      Pharynx: Oropharynx is clear. Eyes:      Extraocular Movements: Extraocular movements intact. Conjunctiva/sclera: Conjunctivae normal.      Pupils: Pupils are equal, round, and reactive to light. Cardiovascular:      Rate and Rhythm: Normal rate and regular rhythm. Pulmonary:      Effort: Pulmonary effort is normal. No respiratory distress. Abdominal:      General: Abdomen is flat. There is no distension. Musculoskeletal:      Cervical back: Normal range of motion and neck supple.    Skin:     Coloration: Skin is not jaundiced or pale. Neurological:      Mental Status: She is alert. Mental status is at baseline. Psychiatric:         Mood and Affect: Mood normal.         Behavior: Behavior normal.                     Data Review:    Review and/or order of clinical lab test      Labs:     Recent Labs     04/20/22  0555 04/19/22  1726   WBC 6.8 9.0   HGB 12.4 16.1   HCT 35.2* 44.6   * 101*     Recent Labs     04/20/22  0555 04/19/22  2126 04/19/22  1726 04/19/22  1333 04/19/22  1333   *  --  128*  --  123*   K 3.2*  --  3.8  --  3.9     --  94*  --  88*   CO2 26  --  24  --  25   BUN 15  --  15  --  15   CREA 0.98  --  1.36*  --  1.78*   * 671* 671*   < > 891*   CA 7.7*  --  8.8  --  9.0    < > = values in this interval not displayed. Recent Labs     04/19/22  1333   ALT 74      TBILI 0.9   TP 7.9   ALB 3.2*   GLOB 4.7*     No results for input(s): INR, PTP, APTT, INREXT in the last 72 hours. No results for input(s): FE, TIBC, PSAT, FERR in the last 72 hours. No results found for: FOL, RBCF   No results for input(s): PH, PCO2, PO2 in the last 72 hours. No results for input(s): CPK, CKNDX, TROIQ in the last 72 hours.     No lab exists for component: CPKMB  Lab Results   Component Value Date/Time    Cholesterol, total 126 04/20/2022 05:55 AM    HDL Cholesterol 22 04/20/2022 05:55 AM    LDL, calculated 77.4 04/20/2022 05:55 AM    Triglyceride 133 04/20/2022 05:55 AM    CHOL/HDL Ratio 5.7 (H) 04/20/2022 05:55 AM     Lab Results   Component Value Date/Time    Glucose (POC) 442 (H) 04/20/2022 12:48 PM    Glucose (POC) 352 (H) 04/20/2022 04:09 AM    Glucose (POC) >600 (HH) 04/19/2022 08:44 PM    Glucose (POC) >600 (HH) 04/19/2022 08:42 PM    Glucose (POC) >600 () 04/19/2022 04:33 PM     Lab Results   Component Value Date/Time    Color YELLOW/STRAW 07/05/2017 11:49 AM    Appearance CLEAR 07/05/2017 11:49 AM    Specific gravity 1.011 07/05/2017 11:49 AM    Specific gravity 1.020 03/29/2010 07:45 PM    pH (UA) 7.0 07/05/2017 11:49 AM    Protein NEGATIVE  07/05/2017 11:49 AM    Glucose NEGATIVE  07/05/2017 11:49 AM    Ketone NEGATIVE  07/05/2017 11:49 AM    Bilirubin NEGATIVE  07/05/2017 11:49 AM    Urobilinogen 0.2 07/05/2017 11:49 AM    Nitrites NEGATIVE  07/05/2017 11:49 AM    Leukocyte Esterase NEGATIVE  07/05/2017 11:49 AM    Epithelial cells FEW 07/05/2017 11:49 AM    Bacteria NEGATIVE  07/05/2017 11:49 AM    WBC 0-4 07/05/2017 11:49 AM    RBC 0-5 07/05/2017 11:49 AM         Medications Reviewed:     Current Facility-Administered Medications   Medication Dose Route Frequency    insulin glargine (LANTUS) injection 46 Units  0.3 Units/kg SubCUTAneous DAILY    insulin lispro (HUMALOG) injection   SubCUTAneous AC&HS    dextrose 10 % infusion 0-250 mL  0-250 mL IntraVENous PRN    insulin lispro (HUMALOG) injection 10 Units  10 Units SubCUTAneous TID WITH MEALS    enoxaparin (LOVENOX) injection 40 mg  40 mg SubCUTAneous Q12H    ondansetron (ZOFRAN) injection 4 mg  4 mg IntraVENous Q4H PRN    acetaminophen (TYLENOL) tablet 650 mg  650 mg Oral Q4H PRN    melatonin tablet 3 mg  3 mg Oral QHS PRN    amLODIPine (NORVASC) tablet 5 mg  5 mg Oral DAILY    aspirin delayed-release tablet 81 mg  81 mg Oral DAILY    venlafaxine-SR (EFFEXOR-XR) capsule 37.5 mg  37.5 mg Oral DAILY    0.9% sodium chloride infusion  100 mL/hr IntraVENous CONTINUOUS    glucose chewable tablet 16 g  4 Tablet Oral PRN    glucagon (GLUCAGEN) injection 1 mg  1 mg IntraMUSCular PRN     ______________________________________________________________________  EXPECTED LENGTH OF STAY: 4d 0h  ACTUAL LENGTH OF STAY:          1                 Margarita Shahid MD

## 2022-04-20 NOTE — PROGRESS NOTES
Bedside shift change report given to Cindy RN (oncoming nurse) by Blossom Romero RN (offgoing nurse). Report included the following information SBAR, Kardex, MAR and Cardiac Rhythm NSR.

## 2022-04-20 NOTE — DIABETES MGMT
[address] [facility]    CLINICAL NURSE SPECIALIST CONSULT     Initial Presentation   Brett Downing is a 43 y.o. male (identifies as female) who presented to the ED with positive home COVID-19 test 8 days ago now with a 5 day c/o chest pain, fatigue, N/V and SOB. In the ED, labs were significant for , , AG 10, GFR 52, A1C 11.8%, positive COVID-19 rapid test.     HX:   Past Medical History:   Diagnosis Date    Depression     Hypertension         INITIAL DX:   COVID-19 virus infection [U07.1]   HHS    Current Treatment     TX: IV fluids, basal/bolus insulin    Consulted by Lillian Raines MD for advanced diabetes nursing assessment and care for:   [x] Home management assessment  [x] Survival skill education    Hospital Course   Clinical progress has been uncomplicated. Diabetes History   History of pre-diabetes: A1C 6.1% 3/21 now 11.8%  No family hx diabetes  PCP: Myrtice Phalen, MD    Diabetes Medication History  Key Antihyperglycemic Medications     Patient is on no antihyperglycemic meds. Diabetes self-management practices:   Eating pattern   [x] Breakfast Skips  [x] Lunch  [employer] or [employer] Take out (2a-5a)  [x] Dinner  Before bed (8-10a): Take out on the way home from work  [x] Snacks Works night shift- grazes on chips, crackers, fruit during her shift  [x] Beverages Regular soda, Gingerale  Physical activity pattern  Sedentary   Monitoring pattern  Never required to check BG before    Social determinants of health impacting diabetes self-management practices   Concerned that you need to know more about how to stay healthy with diabetes    Overall evaluation:    [x] Not achieving A1c target with drug therapy & self-care practices    Subjective   I was told I had slightly high sugar before.      Single  Former Tobacco smoker, current marijuana smoker  Works in [employer] for a [employer]- full time night shift  Born male, identifies as female, not on hormone replacement  Objective   Physical exam  General Obese AA female/male in no acute distress/ill-appearing. Conversant and cooperative  Neuro  Alert, oriented   Vital Signs   Visit Vitals  BP (!) 154/86 (BP 1 Location: Left upper arm, BP Patient Position: At rest)   Pulse 89   Temp 98.2 °F (36.8 °C)   Resp 21   Ht 5' 10\" (1.778 m)   Wt 154.3 kg (340 lb 1.6 oz)   SpO2 98%   BMI 48.80 kg/m²     Skin  Warm and dry. Acanthosis noted along neckline. Heart   Regular rate and rhythm. No murmurs, rubs or gallops  Lungs  Clear to auscultation without rales or rhonchi  Extremities No foot wounds        Laboratory  Recent Labs     22  0555 22  2126 22  1726 22  1333 22  1333   * 671* 671*   < > 891*   AGAP 7  --  10  --  10   TRIGL 133  --   --   --   --    WBC 6.8  --  9.0  --  7.6   CREA 0.98  --  1.36*  --  1.78*   GFRNA >60  --  59*  --  43*   AST  --   --   --   --  31   ALT  --   --   --   --  74    < > = values in this interval not displayed.        Factors impacting BG management  Factor Dose Comments   Nutrition:  Standard meals     60 grams/meal    Pain     Infection COVID-19    Other:   Kidney function   KARLA, GFR now >60      Blood glucose pattern      Significant diabetes-related events over the past 24-72 hours  A1C 11.8%  Low dose basal: 30 units given last night  24 units correctional humalog given since yesterday at noon  Fasting B      Assessment and Plan   Nursing Diagnosis Risk for unstable blood glucose pattern   Nursing Intervention Domain 4607 Decision-making Support   Nursing Interventions Examined current inpatient diabetes/blood glucose control   Explored factors facilitating and impeding inpatient management  Explored corrective strategies with patient and responsible inpatient provider   Informed patient of rational for insulin strategy while hospitalized     Nursing Diagnosis 11885 Ineffective Health Management   Nursing Intervention Domain 2966 Decision-makingSupport   Nursing Interventions Identified diabetes self-management practices impeding diabetes control  Discussed diabetes survival skills related to  1. Healthy Plate eating plan; given handouts and discussed. Patient interested in drinking non-sugary drinks and less carb containing snacks  2. Role of physical activity in improving insulin sensitivity and action  3. Procedure for blood glucose monitoring   4. Medications plan at discharge: including metformin, long and short acting insulin. Patient simulated insulin PEN use. Evaluation   Teofilo Guzman is a 43year old transgender female, not on hormone therapy and with a history of pre-diabetes, who presented to the ED with acute hypoxic respiratory failure and dehydration s/t COVID-19 viral infection. In the ED, labs were significant for , , AG 10, GFR 52, A1C 11.8% consistent with HHS and new onset Type 2 Diabetes. She was fluid resuscitated and started on low dose basal/correctional insulin. Glucose did trend down to 336 and she is ready to start eating. Please increase insulin to moderate weight based dosing and add bolus insulin. Inpatient glucose goal is 140-180mg/dl. Recommendations   1. POC glucose ACHS    2. Consistent carbohydrate diet (60 grams CHO/meal)    3.  Adjust the subcutaneous Insulin Order set (5380)  Insulin Dosing Specific recommendation   Basal                                      (Based on weight, BMI & GFR) [x] 0.3 units/kg/D: 46 units Lantus daily   If fasting BG over 180 tomorrow, increase dose to 0.4 units/kg/D   Nutritional                                       (Based on CHO/dextrose load) Moderate sensitivity: 10 units Humalog/meal    Hold if patient consumes less than 50% of carbohydrates on meal tray Advance by 2 units daily for   persistent pre-prandial   hyperglycemia     Corrective                                       (Useful in adjusting insulin dosing) [x] Insulin-resistant sensitivity [facility]      Discharge Recommendations   1. Will need a FUV with PCP within 1-2 weeks after hospital discharge for ongoing diabetes management     2. On Discharge, please place an outpatient order for \"diabetes education\" (enter as REF20). This will trigger a referral for the Program [facility] Diabetes [facility] which includes outpatient diabetes self management training with a certified diabetes educator. 3. Prescription for glucometer kit (Meter, Lancets (100), Strips (100)). Patient to obtain a blood glucose reading four times daily. First thing in the morning prior to eating and drinking anything then before lunch, dinner and bedtime. Create a log and present to PCP for interpretation. 4. Metformin SR: 500mg BID    5. Insulin TBD    6. Pen Needle, Diabetic 32 Gauge x 1/4\" (1 box)      Billing Code(s)   [x] 17923/41101    Before making these care recommendations, I personally reviewed the hospitalization record, including notes, laboratory & diagnostic data and current medications, and examined the patient at the bedside (circumstances permitting) before making care recommendations. More than fifty (50) percent of the time was spent in patient counseling and/or care coordination.   Total minutes: 65    MEENA Weldon  Diabetes Clinical Nurse Specialist  [facility]  Access via Piehole

## 2022-04-20 NOTE — PROGRESS NOTES
Comprehensive Nutrition Assessment      Type and Reason for Visit: Initial,Positive nutrition screen    Nutrition Recommendations/Plan:   1. Continue 4 carb choice- add ONS to meet higher kcal/protein needs of pt with large stature  2. Added Ensure HP BID and Gelatein x 1 to help meet protein needs. 3. Check BG, weight, ONS intake      Nutrition Assessment:       Pt admitted for COVID-19 virus infection [U07.1]. Past Medical History:   Diagnosis Date    Depression     Hypertension      Pt admitted with BG ~900. Pt also COVID+. Pt with \"pre-DM\" just over a year ago with A1c 6.1, now 11.8. Significant increase over the last year. Pt notes she was not eating well for the last ~3 weeks and does feel like she lost some weight during this time as she was just drinking liquids. Pt agreeable to RN recs of eliminating \"sugary drinks\" and is encouraged to make appointment with program for DM health on d/c. Pt did eat breakfast this morning and eating lunch during call. Appetite seems improved currently and eating well. Denies chew/swallow difficulties. Notes no BM since admission - pt usually has a BM daily. Considering pt hadn't eaten yesterday 2/2 admission and BG issues, pt not constipated just didn't eat enough. No known food allergies. Pt down 33 lbs (8.8%) over last ~1 month which is significant for time frame with poor PO during this time, meeting <50% of needs >7 days.       Wt Readings from Last 10 Encounters:   04/20/22 154.3 kg (340 lb 1.6 oz)   02/23/21 (!) 169.2 kg (373 lb)   09/23/19 (!) 171 kg (377 lb)   12/03/18 (!) 169.8 kg (374 lb 6.4 oz)   09/09/18 (!) 165.8 kg (365 lb 9.6 oz)   08/11/17 (!) 161.5 kg (356 lb)   07/05/17 (!) 160.6 kg (354 lb 2 oz)   01/20/16 (!) 170.1 kg (375 lb)   10/07/15 131.5 kg (290 lb)   05/12/15 (!) 168.3 kg (371 lb)       Malnutrition Assessment:  Malnutrition Status:  Severe malnutrition    Context:  Acute illness     Findings of the 6 clinical characteristics of malnutrition:   Energy Intake:  50% or less of est energy requirements for 5 or more days  Weight Loss:  Greater than 5% over 1 month     Body Fat Loss:  Unable to assess,     Muscle Mass Loss:  Unable to assess,    Fluid Accumulation:  No significant fluid accumulation,     Strength:  Not performed       Estimated Daily Nutrient Needs:  Energy (kcal): 2315; Weight Used for Energy Requirements: Current  Protein (g): 150; Weight Used for Protein Requirements: Ideal  Fluid (ml/day): 2315; Method Used for Fluid Requirements: 1 ml/kcal    Documented meal intake:   No data found. Documented Supplement intake:  No data found.     Nutrition Related Findings:    Bowel sounds:    Last BM: 04/19/22, Loose  Edema: No data recorded    Medications:     Current Facility-Administered Medications   Medication Dose Route Frequency    insulin glargine (LANTUS) injection 46 Units  0.3 Units/kg SubCUTAneous DAILY    insulin lispro (HUMALOG) injection   SubCUTAneous AC&HS    insulin lispro (HUMALOG) injection 10 Units  10 Units SubCUTAneous TID WITH MEALS    enoxaparin (LOVENOX) injection 40 mg  40 mg SubCUTAneous Q12H    amLODIPine (NORVASC) tablet 5 mg  5 mg Oral DAILY    aspirin delayed-release tablet 81 mg  81 mg Oral DAILY    venlafaxine-SR (EFFEXOR-XR) capsule 37.5 mg  37.5 mg Oral DAILY    0.9% sodium chloride infusion  100 mL/hr IntraVENous CONTINUOUS         Wounds:    None       Current Nutrition Therapies:  ADULT DIET Regular; 4 carb choices (60 gm/meal)    Anthropometric Measures:  · Height:  5' 10\" (177.8 cm)  · Current Body Wt:  154.3 kg (340 lb 2.7 oz)   · Admission Body Wt:       · Usual Body Wt:   350-360 lbs     · Ideal Body Wt:  166 lbs:  204.9 %   · BMI Category:  Obese class 3 (BMI 40.0 or greater)       Nutrition Diagnosis:   · Limited adherence to nutrition-related recommendations related to endocrine dysfunction as evidenced by BMI,lab values      Nutrition Interventions:   Food and/or Nutrient Delivery: Modify current diet,Start oral nutrition supplement  Nutrition Education and Counseling: No recommendations at this time  Coordination of Nutrition Care: Continue to monitor while inpatient,Interdisciplinary rounds    Goals:  PO intake 50% or greater,within 7 days       Nutrition Monitoring and Evaluation:   Behavioral-Environmental Outcomes: None identified  Food/Nutrient Intake Outcomes: Food and nutrient intake,Supplement intake  Physical Signs/Symptoms Outcomes: Biochemical data,Weight    Discharge Planning:    Recommend pursue outpatient nutrition counseling,Continue current diet,Continue oral nutrition supplement     Electronically signed by Burgess Dewey [address] [place] [address] Ave: [phone]

## 2022-04-21 LAB
GLUCOSE BLD STRIP.AUTO-MCNC: 202 MG/DL (ref 65–117)
GLUCOSE BLD STRIP.AUTO-MCNC: 270 MG/DL (ref 65–117)
GLUCOSE BLD STRIP.AUTO-MCNC: 270 MG/DL (ref 65–117)
GLUCOSE BLD STRIP.AUTO-MCNC: 283 MG/DL (ref 65–117)
GLUCOSE BLD STRIP.AUTO-MCNC: 303 MG/DL (ref 65–117)
SERVICE CMNT-IMP: ABNORMAL

## 2022-04-21 PROCEDURE — 74011250637 HC RX REV CODE- 250/637: Performed by: INTERNAL MEDICINE

## 2022-04-21 PROCEDURE — 82962 GLUCOSE BLOOD TEST: CPT

## 2022-04-21 PROCEDURE — 74011636637 HC RX REV CODE- 636/637: Performed by: HOSPITALIST

## 2022-04-21 PROCEDURE — 74011250636 HC RX REV CODE- 250/636: Performed by: INTERNAL MEDICINE

## 2022-04-21 PROCEDURE — 74011636637 HC RX REV CODE- 636/637: Performed by: INTERNAL MEDICINE

## 2022-04-21 PROCEDURE — 74011250637 HC RX REV CODE- 250/637: Performed by: HOSPITALIST

## 2022-04-21 PROCEDURE — 99231 SBSQ HOSP IP/OBS SF/LOW 25: CPT | Performed by: CLINICAL NURSE SPECIALIST

## 2022-04-21 PROCEDURE — 65660000001 HC RM ICU INTERMED STEPDOWN

## 2022-04-21 RX ORDER — POLYETHYLENE GLYCOL 3350 17 G/17G
17 POWDER, FOR SOLUTION ORAL DAILY
Status: DISCONTINUED | OUTPATIENT
Start: 2022-04-21 | End: 2022-04-22 | Stop reason: HOSPADM

## 2022-04-21 RX ORDER — INSULIN GLARGINE 100 [IU]/ML
50 INJECTION, SOLUTION SUBCUTANEOUS DAILY
Status: DISCONTINUED | OUTPATIENT
Start: 2022-04-21 | End: 2022-04-22 | Stop reason: HOSPADM

## 2022-04-21 RX ORDER — INSULIN LISPRO 100 [IU]/ML
12 INJECTION, SOLUTION INTRAVENOUS; SUBCUTANEOUS
Status: DISCONTINUED | OUTPATIENT
Start: 2022-04-21 | End: 2022-04-22 | Stop reason: HOSPADM

## 2022-04-21 RX ADMIN — SODIUM CHLORIDE 100 ML/HR: 9 INJECTION, SOLUTION INTRAVENOUS at 23:32

## 2022-04-21 RX ADMIN — Medication 12 UNITS: at 09:48

## 2022-04-21 RX ADMIN — Medication 10 UNITS: at 13:16

## 2022-04-21 RX ADMIN — Medication 12 UNITS: at 18:47

## 2022-04-21 RX ADMIN — Medication 3 MG: at 00:57

## 2022-04-21 RX ADMIN — Medication 7 UNITS: at 18:47

## 2022-04-21 RX ADMIN — ENOXAPARIN SODIUM 40 MG: 100 INJECTION SUBCUTANEOUS at 01:02

## 2022-04-21 RX ADMIN — INSULIN GLARGINE 50 UNITS: 100 INJECTION, SOLUTION SUBCUTANEOUS at 09:49

## 2022-04-21 RX ADMIN — SODIUM CHLORIDE 100 ML/HR: 9 INJECTION, SOLUTION INTRAVENOUS at 13:20

## 2022-04-21 RX ADMIN — VENLAFAXINE HYDROCHLORIDE 37.5 MG: 37.5 CAPSULE, EXTENDED RELEASE ORAL at 09:43

## 2022-04-21 RX ADMIN — ENOXAPARIN SODIUM 40 MG: 100 INJECTION SUBCUTANEOUS at 13:14

## 2022-04-21 RX ADMIN — AMLODIPINE BESYLATE 5 MG: 5 TABLET ORAL at 09:43

## 2022-04-21 RX ADMIN — Medication 7 UNITS: at 09:47

## 2022-04-21 RX ADMIN — Medication 2 UNITS: at 23:00

## 2022-04-21 RX ADMIN — SODIUM CHLORIDE 100 ML/HR: 9 INJECTION, SOLUTION INTRAVENOUS at 00:57

## 2022-04-21 RX ADMIN — Medication 4 UNITS: at 00:50

## 2022-04-21 RX ADMIN — POLYETHYLENE GLYCOL 3350 17 G: 17 POWDER, FOR SOLUTION ORAL at 13:16

## 2022-04-21 RX ADMIN — ASPIRIN 81 MG: 81 TABLET, COATED ORAL at 09:43

## 2022-04-21 RX ADMIN — Medication 12 UNITS: at 13:15

## 2022-04-21 NOTE — DIABETES MGMT
[address] [facility]    CLINICAL NURSE SPECIALIST CONSULT     Initial Presentation   Rebel Mcgill is a 43 y.o. adult (identifies as female) who presented to the ED with positive home COVID-19 test 8 days ago now with a 5 day c/o chest pain, fatigue, N/V and SOB. In the ED, labs were significant for , , AG 10, GFR 52, A1C 11.8%, positive COVID-19 rapid test.     HX:   Past Medical History:   Diagnosis Date    Depression     Hypertension         INITIAL DX:   COVID-19 virus infection [U07.1]   HHS    Current Treatment     TX: IV fluids, basal/bolus insulin    Consulted by Landy Hill MD for advanced diabetes nursing assessment and care for:   [x] Home management assessment  [x] Survival skill education    Hospital Course   Clinical progress has been uncomplicated. Diabetes History   History of pre-diabetes: A1C 6.1% 3/21 now 11.8%  No family hx diabetes  PCP: Marysol Novoa MD    Diabetes Medication History  Key Antihyperglycemic Medications     Patient is on no antihyperglycemic meds. Diabetes self-management practices:   Eating pattern   [x] Breakfast Skips  [x] Lunch  [employer] or [employer] Take out (2a-5a)  [x] Dinner  Before bed (8-10a): Take out on the way home from work  [x] Snacks Works night shift- grazes on chips, crackers, fruit during her shift  [x] Beverages Regular soda, Gingerale  Physical activity pattern  Sedentary   Monitoring pattern  Never required to check BG before    Social determinants of health impacting diabetes self-management practices   Concerned that you need to know more about how to stay healthy with diabetes    Overall evaluation:    [x] Not achieving A1c target with drug therapy & self-care practices    Subjective   Via phone: Patient very grateful of care received and appreciative of efforts to help her understand her diabetes diagnosis. Had no questions re: education received re: survival skills for diabetes.  Only concerned with how she was going to know how much insulin to give herself. Assured her that we are working to see how much insulin requires and she will know how much she needs when discharged. Single  Former Tobacco smoker, current marijuana smoker  Works in [employer] for a [employer]- full time night shift  Born male, identifies as female, not on hormone replacement  Objective   Physical exam  General Obese AA female/male in no acute distress/ill-appearing. Conversant and cooperative  Neuro  Alert, oriented   Vital Signs   Visit Vitals  BP (!) 145/80 (BP 1 Location: Left upper arm, BP Patient Position: At rest)   Pulse 92   Temp 97.4 °F (36.3 °C)   Resp 18   Ht 5' 10\" (1.778 m)   Wt 154.3 kg (340 lb 1.6 oz)   SpO2 98%   BMI 48.80 kg/m²     Skin  Warm and dry. Acanthosis noted along neckline. Heart   Regular rate and rhythm. No murmurs, rubs or gallops  Lungs  Clear to auscultation without rales or rhonchi  Extremities No foot wounds        Laboratory  Recent Labs     22  0555 22  2126 22  1726 22  1333 22  1333   * 671* 671*   < > 891*   AGAP 7  --  10  --  10   TRIGL 133  --   --   --   --    WBC 6.8  --  9.0  --  7.6   CREA 0.98  --  1.36*  --  1.78*   GFRNA >60  --  59*  --  43*   AST  --   --   --   --  31   ALT  --   --   --   --  74    < > = values in this interval not displayed.        Factors impacting BG management  Factor Dose Comments   Nutrition:  Standard meals     60 grams/meal    Pain     Infection COVID-19    Other:   Kidney function   KARLA, GFR now >60      Blood glucose pattern      Significant diabetes-related events over the past 24-72 hours  A1C 11.8%  Low dose basal: 30 units given last night  24 units correctional humalog given since yesterday at noon  Fasting B  : , noted basal and bolus insulin advanced by hospitalist this AM; pre prandial       Assessment and Plan   Nursing Diagnosis Risk for unstable blood glucose pattern   Nursing Intervention Domain 5250 Decision-making Support   Nursing Interventions Examined current inpatient diabetes/blood glucose control   Explored factors facilitating and impeding inpatient management  Explored corrective strategies with patient and responsible inpatient provider   Informed patient of rational for insulin strategy while hospitalized     Nursing Diagnosis 16599 Ineffective Health Management   Nursing Intervention Domain 5250 Decision-makingSupport   Nursing Interventions Identified diabetes self-management practices impeding diabetes control  Discussed diabetes survival skills related to  1. Healthy Plate eating plan; given handouts and discussed. Patient interested in drinking non-sugary drinks and less carb containing snacks  2. Role of physical activity in improving insulin sensitivity and action  3. Procedure for blood glucose monitoring   4. Medications plan at discharge: including metformin, long and short acting insulin. Patient simulated insulin PEN use. Evaluation   Cristo Ness is a 43year old transgender female, not on hormone therapy and with a history of pre-diabetes, who presented to the ED with acute hypoxic respiratory failure and dehydration s/t COVID-19 viral infection. In the ED, labs were significant for , , AG 10, GFR 52, A1C 11.8% consistent with HHS and new onset Type 2 Diabetes. She was fluid resuscitated and started on low dose basal/correctional insulin. Glucose did trend down to 336 and she is ready to start eating. Please increase insulin to moderate weight based dosing and add bolus insulin. Inpatient glucose goal is 140-180mg/dl. Insulin resistance is apparent with this patient. Hospitalist, along with diabetes mgmt ensuring proper advancement of insulins to get BG to target 140-180. Will likely require basal and bolus advancement on 4/22 as well. Recommendations   1. POC glucose ACHS    2.  Consistent carbohydrate diet (60 grams CHO/meal)    3. Adjust the subcutaneous Insulin Order set (8628)  Insulin Dosing Specific recommendation   ADVANCE Basal if AM BG >200 on 4/22                                       (Based on weight, BMI & GFR) [x] 0.4 units/kg/D: 60 units Lantus daily   If fasting BG over 180 tomorrow, increase dose to 0.5 units/kg/D   ADVANCE pre meal if prandial BG >200  Nutritional                                       (Based on CHO/dextrose load) Moderate sensitivity: 15 units Humalog/meal    Hold if patient consumes less than 50% of carbohydrates on meal tray Advance by 2 units daily for   persistent pre-prandial   hyperglycemia     CONTINUE Corrective                        (Useful in adjusting insulin dosing) [x] Insulin-resistant sensitivity ACHS      Discharge Recommendations   1. Will need a FUV with PCP within 1-2 weeks after hospital discharge for ongoing diabetes management     2. On Discharge, please place an outpatient order for \"diabetes education\" (enter as REF20). This will trigger a referral for the Program [facility] Diabetes [facility] which includes outpatient diabetes self management training with a certified diabetes educator. 3. Prescription for glucometer kit (Meter, Lancets (100), Strips (100)). Patient to obtain a blood glucose reading four times daily. First thing in the morning prior to eating and drinking anything then before lunch, dinner and bedtime. Create a log and present to PCP for interpretation. 4. Metformin SR: 500mg BID    5. Insulin TBD    6. Pen Needle, Diabetic 32 Gauge x 1/4\" (1 box)      Billing Code(s)   [x] 01721    Before making these care recommendations, I personally reviewed the hospitalization record, including notes, laboratory & diagnostic data and current medications, and examined the patient at the bedside (circumstances permitting) before making care recommendations.  More than fifty (50) percent of the time was spent in patient counseling and/or care coordination.   Total minutes:15    MEENA Miles  Diabetes Clinical Nurse Specialist  [facility]  Access via ScaleGrid

## 2022-04-21 NOTE — PROGRESS NOTES
Bedside shift change report given to MARTÍNEZ Kramer (oncoming nurse) by Sharon Chicas (offgoing nurse).  Report included the following information SBAR, Procedure Summary, Intake/Output, MAR, Recent Results, Med Rec Status and Cardiac Rhythm SR.

## 2022-04-21 NOTE — PROGRESS NOTES
[facility] [facility]  Hospitalist Group                                                                                          Hospitalist Progress Note  Kris Drummond MD  Answering service: [phone] from in house phone        Date of Service:  2022  NAME:  Moon Taveras  :  1985  MRN:  [id number]      Admission Summary:   Copied form admit: \" Moon Taveras is a 43 y.o. male who presents with fatigue, dryness as in constant thrist; found to have bs 900, recent + covid ( 6 days ago +/-) and no h/o DM and not in her parents  Pt is obese sedentary  As risk and else has htn, no exacerbating nor relieving factors, mild nausea, w vomiting  Early am but currently hungry. Rosalee Cleveland;       The patient denies any headache, blurry vision, sore throat, trouble swallowing, trouble with speech, chest pain, SOB, cough, fever, chills,     constipation, recent travels, sick contacts, focal or generalized neurological symptoms, falls, injuries, rashes,   hematemesis, melena, hemoptysis, hematuria, rashes, denies starting any new medications and denies any other concerns or problems besides as mentioned above. \"    Interval history / Subjective:     2022 :    Constipated, else no complaints   Awake and alert   Had diabetic education    No new issues    bg still in high 200, occasionally > 300s       Assessment & Plan:     Asymptomatic COVID infection, supportive care     non ketotic hyperosmolar state - resolving    New onset DM   -  A1c 11.8  -  Increased Lantus to 50U and Mealtime to 12U with meals     Morbid Obesity   Body mass index is 48.8 kg/m².      HTN; treat as indicated    Hypokalemia - replaced     Hyponatremia  Improved with IVF    DIET: ADULT DIET dm reg  ISOLATION PRECAUTIONS: There are currently no Active Isolations  CODE STATUS: Prior   DVT PROPHYLAXIS: Lovenox  FUNCTIONAL STATUS PRIOR TO HOSPITALIZATION: Fully active and ambulatory; able to carry on all self-care without restriction. EARLY MOBILITY ASSESSMENT: Recommend routine ambulation while hospitalized with the assistance of nursing staff  ANTICIPATED DISCHARGE: later today or in AM if BG better  EMERGENCY CONTACT/SURROGATE DECISION MAKER: MEKHI        Hospital Problems  Date Reviewed: 2/23/2021          Codes Class Noted POA    COVID-19 virus infection ICD-10-CM: U07.1  ICD-9-CM: 079.89  4/19/2022 Unknown                  After personally interviewing pt at bedside the following is noted on 4/21/2022 :    Review of Systems   Constitutional: Negative for chills and fever. Respiratory: Negative for cough, shortness of breath and wheezing. Gastrointestinal: Positive for constipation. All other systems reviewed and are negative. I had a face to face encounter with patient on 4/21/2022 at bedside for the following physical exam:     PHYSICAL EXAM:    Visit Vitals  /70 (BP 1 Location: Left upper arm, BP Patient Position: At rest)   Pulse 85   Temp 98.8 °F (37.1 °C)   Resp 19   Ht 5' 10\" (1.778 m)   Wt 154.3 kg (340 lb 1.6 oz)   SpO2 99%   BMI 48.80 kg/m²          Physical Exam  Constitutional:       General: She is not in acute distress. Appearance: She is obese. She is not ill-appearing, toxic-appearing or diaphoretic. HENT:      Head: Normocephalic and atraumatic. Right Ear: External ear normal.      Left Ear: External ear normal.      Nose: Nose normal.      Mouth/Throat:      Mouth: Mucous membranes are moist.      Pharynx: Oropharynx is clear. Eyes:      Extraocular Movements: Extraocular movements intact. Conjunctiva/sclera: Conjunctivae normal.      Pupils: Pupils are equal, round, and reactive to light. Cardiovascular:      Rate and Rhythm: Normal rate and regular rhythm. Pulmonary:      Effort: Pulmonary effort is normal. No respiratory distress. Abdominal:      General: Abdomen is flat. There is no distension.    Musculoskeletal:      Cervical back: Normal range of motion and neck supple. Skin:     Coloration: Skin is not jaundiced or pale. Neurological:      Mental Status: She is alert. Mental status is at baseline. Psychiatric:         Mood and Affect: Mood normal.         Behavior: Behavior normal.                     Data Review:    Review and/or order of clinical lab test      Labs:     Recent Labs     04/20/22  0555 04/19/22  1726   WBC 6.8 9.0   HGB 12.4 16.1   HCT 35.2* 44.6   * 101*     Recent Labs     04/20/22  0555 04/19/22  2126 04/19/22  1726 04/19/22  1333 04/19/22  1333   *  --  128*  --  123*   K 3.2*  --  3.8  --  3.9     --  94*  --  88*   CO2 26  --  24  --  25   BUN 15  --  15  --  15   CREA 0.98  --  1.36*  --  1.78*   * 671* 671*   < > 891*   CA 7.7*  --  8.8  --  9.0    < > = values in this interval not displayed. Recent Labs     04/19/22  1333   ALT 74      TBILI 0.9   TP 7.9   ALB 3.2*   GLOB 4.7*     No results for input(s): INR, PTP, APTT, INREXT, INREXT in the last 72 hours. No results for input(s): FE, TIBC, PSAT, FERR in the last 72 hours. No results found for: FOL, RBCF   No results for input(s): PH, PCO2, PO2 in the last 72 hours. No results for input(s): CPK, CKNDX, TROIQ in the last 72 hours.     No lab exists for component: CPKMB  Lab Results   Component Value Date/Time    Cholesterol, total 126 04/20/2022 05:55 AM    HDL Cholesterol 22 04/20/2022 05:55 AM    LDL, calculated 77.4 04/20/2022 05:55 AM    Triglyceride 133 04/20/2022 05:55 AM    CHOL/HDL Ratio 5.7 (H) 04/20/2022 05:55 AM     Lab Results   Component Value Date/Time    Glucose (POC) 303 (H) 04/21/2022 11:50 AM    Glucose (POC) 270 (H) 04/21/2022 08:19 AM    Glucose (POC) 283 (H) 04/21/2022 12:07 AM    Glucose (POC) 297 (H) 04/20/2022 05:46 PM    Glucose (POC) 442 (H) 04/20/2022 12:48 PM     Lab Results   Component Value Date/Time    Color YELLOW/STRAW 07/05/2017 11:49 AM    Appearance CLEAR 07/05/2017 11:49 AM    Specific gravity 1.011 07/05/2017 11:49 AM    Specific gravity 1.020 03/29/2010 07:45 PM    pH (UA) 7.0 07/05/2017 11:49 AM    Protein NEGATIVE  07/05/2017 11:49 AM    Glucose NEGATIVE  07/05/2017 11:49 AM    Ketone NEGATIVE  07/05/2017 11:49 AM    Bilirubin NEGATIVE  07/05/2017 11:49 AM    Urobilinogen 0.2 07/05/2017 11:49 AM    Nitrites NEGATIVE  07/05/2017 11:49 AM    Leukocyte Esterase NEGATIVE  07/05/2017 11:49 AM    Epithelial cells FEW 07/05/2017 11:49 AM    Bacteria NEGATIVE  07/05/2017 11:49 AM    WBC 0-4 07/05/2017 11:49 AM    RBC 0-5 07/05/2017 11:49 AM         Medications Reviewed:     Current Facility-Administered Medications   Medication Dose Route Frequency    insulin lispro (HUMALOG) injection 12 Units  12 Units SubCUTAneous TID WITH MEALS    insulin glargine (LANTUS) injection 50 Units  50 Units SubCUTAneous DAILY    polyethylene glycol (MIRALAX) packet 17 g  17 g Oral DAILY    insulin lispro (HUMALOG) injection   SubCUTAneous AC&HS    dextrose 10 % infusion 0-250 mL  0-250 mL IntraVENous PRN    enoxaparin (LOVENOX) injection 40 mg  40 mg SubCUTAneous Q12H    ondansetron (ZOFRAN) injection 4 mg  4 mg IntraVENous Q4H PRN    acetaminophen (TYLENOL) tablet 650 mg  650 mg Oral Q4H PRN    melatonin tablet 3 mg  3 mg Oral QHS PRN    amLODIPine (NORVASC) tablet 5 mg  5 mg Oral DAILY    aspirin delayed-release tablet 81 mg  81 mg Oral DAILY    venlafaxine-SR (EFFEXOR-XR) capsule 37.5 mg  37.5 mg Oral DAILY    0.9% sodium chloride infusion  100 mL/hr IntraVENous CONTINUOUS    glucose chewable tablet 16 g  4 Tablet Oral PRN    glucagon (GLUCAGEN) injection 1 mg  1 mg IntraMUSCular PRN     ______________________________________________________________________  EXPECTED LENGTH OF STAY: 4d 0h  ACTUAL LENGTH OF STAY:          2                 Maxime Middleton MD

## 2022-04-22 ENCOUNTER — TELEPHONE (OUTPATIENT)
Dept: INTERNAL MEDICINE CLINIC | Age: 37
End: 2022-04-22

## 2022-04-22 VITALS
OXYGEN SATURATION: 97 % | RESPIRATION RATE: 21 BRPM | SYSTOLIC BLOOD PRESSURE: 132 MMHG | WEIGHT: 315 LBS | DIASTOLIC BLOOD PRESSURE: 80 MMHG | TEMPERATURE: 98.5 F | HEIGHT: 70 IN | HEART RATE: 108 BPM | BODY MASS INDEX: 45.1 KG/M2

## 2022-04-22 LAB
BASOPHILS # BLD: 0 K/UL (ref 0–0.1)
BASOPHILS NFR BLD: 1 % (ref 0–1)
DIFFERENTIAL METHOD BLD: ABNORMAL
EOSINOPHIL # BLD: 0.1 K/UL (ref 0–0.4)
EOSINOPHIL NFR BLD: 2 % (ref 0–7)
ERYTHROCYTE [DISTWIDTH] IN BLOOD BY AUTOMATED COUNT: 12 % (ref 11.5–14.5)
GLUCOSE BLD STRIP.AUTO-MCNC: 209 MG/DL (ref 65–117)
GLUCOSE BLD STRIP.AUTO-MCNC: 282 MG/DL (ref 65–117)
HCT VFR BLD AUTO: 41 % (ref 36.6–50.3)
HGB BLD-MCNC: 14.3 G/DL (ref 12.1–17)
IMM GRANULOCYTES # BLD AUTO: 0 K/UL (ref 0–0.04)
IMM GRANULOCYTES NFR BLD AUTO: 0 % (ref 0–0.5)
LYMPHOCYTES # BLD: 2.8 K/UL (ref 0.8–3.5)
LYMPHOCYTES NFR BLD: 64 % (ref 12–49)
MCH RBC QN AUTO: 31 PG (ref 26–34)
MCHC RBC AUTO-ENTMCNC: 34.9 G/DL (ref 30–36.5)
MCV RBC AUTO: 88.9 FL (ref 80–99)
MONOCYTES # BLD: 0.7 K/UL (ref 0–1)
MONOCYTES NFR BLD: 15 % (ref 5–13)
NEUTS SEG # BLD: 0.8 K/UL (ref 1.8–8)
NEUTS SEG NFR BLD: 18 % (ref 32–75)
NRBC # BLD: 0 K/UL (ref 0–0.01)
NRBC BLD-RTO: 0 PER 100 WBC
PLATELET # BLD AUTO: 107 K/UL (ref 150–400)
RBC # BLD AUTO: 4.61 M/UL (ref 4.1–5.7)
RBC MORPH BLD: ABNORMAL
SERVICE CMNT-IMP: ABNORMAL
SERVICE CMNT-IMP: ABNORMAL
WBC # BLD AUTO: 4.4 K/UL (ref 4.1–11.1)

## 2022-04-22 PROCEDURE — 82962 GLUCOSE BLOOD TEST: CPT

## 2022-04-22 PROCEDURE — 36415 COLL VENOUS BLD VENIPUNCTURE: CPT

## 2022-04-22 PROCEDURE — 85025 COMPLETE CBC W/AUTO DIFF WBC: CPT

## 2022-04-22 PROCEDURE — 74011636637 HC RX REV CODE- 636/637: Performed by: HOSPITALIST

## 2022-04-22 PROCEDURE — 74011250637 HC RX REV CODE- 250/637: Performed by: HOSPITALIST

## 2022-04-22 PROCEDURE — 74011250637 HC RX REV CODE- 250/637: Performed by: INTERNAL MEDICINE

## 2022-04-22 PROCEDURE — 99232 SBSQ HOSP IP/OBS MODERATE 35: CPT | Performed by: CLINICAL NURSE SPECIALIST

## 2022-04-22 PROCEDURE — 74011250636 HC RX REV CODE- 250/636: Performed by: INTERNAL MEDICINE

## 2022-04-22 PROCEDURE — 74011636637 HC RX REV CODE- 636/637: Performed by: INTERNAL MEDICINE

## 2022-04-22 RX ORDER — INSULIN LISPRO 100 [IU]/ML
12 INJECTION, SOLUTION INTRAVENOUS; SUBCUTANEOUS
Qty: 4 PEN | Refills: 0 | Status: SHIPPED | OUTPATIENT
Start: 2022-04-22 | End: 2022-05-09 | Stop reason: SDUPTHER

## 2022-04-22 RX ORDER — METFORMIN HYDROCHLORIDE 500 MG/1
500 TABLET, EXTENDED RELEASE ORAL 2 TIMES DAILY
Status: DISCONTINUED | OUTPATIENT
Start: 2022-04-22 | End: 2022-04-22 | Stop reason: HOSPADM

## 2022-04-22 RX ORDER — INSULIN PUMP SYRINGE, 3 ML
EACH MISCELLANEOUS
Qty: 1 KIT | Refills: 0 | Status: SHIPPED | OUTPATIENT
Start: 2022-04-22

## 2022-04-22 RX ORDER — INSULIN GLARGINE 100 [IU]/ML
50 INJECTION, SOLUTION SUBCUTANEOUS DAILY
Qty: 5 PEN | Refills: 0 | Status: SHIPPED | OUTPATIENT
Start: 2022-04-22 | End: 2022-05-25 | Stop reason: SDUPTHER

## 2022-04-22 RX ORDER — OXYMETAZOLINE HCL 0.05 %
2 SPRAY, NON-AEROSOL (ML) NASAL 2 TIMES DAILY
Status: DISCONTINUED | OUTPATIENT
Start: 2022-04-22 | End: 2022-04-22 | Stop reason: HOSPADM

## 2022-04-22 RX ORDER — METFORMIN HYDROCHLORIDE 500 MG/1
500 TABLET, EXTENDED RELEASE ORAL 2 TIMES DAILY
Qty: 60 TABLET | Refills: 0 | Status: SHIPPED | OUTPATIENT
Start: 2022-04-22 | End: 2022-05-25 | Stop reason: SDUPTHER

## 2022-04-22 RX ORDER — PEN NEEDLE, DIABETIC 29 G X1/2"
NEEDLE, DISPOSABLE MISCELLANEOUS
Qty: 100 PEN NEEDLE | Refills: 0 | Status: SHIPPED | OUTPATIENT
Start: 2022-04-22 | End: 2022-05-20 | Stop reason: SDUPTHER

## 2022-04-22 RX ADMIN — ENOXAPARIN SODIUM 40 MG: 100 INJECTION SUBCUTANEOUS at 01:20

## 2022-04-22 RX ADMIN — Medication 12 UNITS: at 10:09

## 2022-04-22 RX ADMIN — AMLODIPINE BESYLATE 5 MG: 5 TABLET ORAL at 10:08

## 2022-04-22 RX ADMIN — ASPIRIN 81 MG: 81 TABLET, COATED ORAL at 10:08

## 2022-04-22 RX ADMIN — METFORMIN HYDROCHLORIDE 500 MG: 500 TABLET, EXTENDED RELEASE ORAL at 10:00

## 2022-04-22 RX ADMIN — Medication 12 UNITS: at 12:30

## 2022-04-22 RX ADMIN — Medication 4 UNITS: at 10:09

## 2022-04-22 RX ADMIN — Medication 7 UNITS: at 12:31

## 2022-04-22 RX ADMIN — POLYETHYLENE GLYCOL 3350 17 G: 17 POWDER, FOR SOLUTION ORAL at 10:08

## 2022-04-22 RX ADMIN — INSULIN GLARGINE 50 UNITS: 100 INJECTION, SOLUTION SUBCUTANEOUS at 10:08

## 2022-04-22 NOTE — PROGRESS NOTES
0800  Bedside shift change report given to Wandy Sequeira (oncoming nurse) by Serina Castellanos RN (offgoing nurse). Report included the following information SBAR, Kardex, MAR, Accordion, Recent Results and Cardiac Rhythm NSR.     0350  Pt had a nosebleed when she returned to bed from bathroom. I put some ice over her nose and it stopped. Notified the provider. Received on order for cbc to check platelets and afrin. Pt refused to use afrin. Problem: Risk for Spread of Infection  Goal: Prevent transmission of infectious organism to others  Description: Prevent the transmission of infectious organisms to other patients, staff members, and visitors. Outcome: Progressing Towards Goal     Problem: Falls - Risk of  Goal: *Absence of Falls  Description: Document Aquilino Chance Fall Risk and appropriate interventions in the flowsheet. Outcome: Progressing Towards Goal  Note: Fall Risk Interventions:       Mentation Interventions: Adequate sleep, hydration, pain control    Medication Interventions: Assess postural VS orthostatic hypotension,Evaluate medications/consider consulting pharmacy,Patient to call before getting OOB    Elimination Interventions: Call light in reach,Patient to call for help with toileting needs,Toilet paper/wipes in reach              Problem: Airway Clearance - Ineffective  Goal: Achieve or maintain patent airway  Outcome: Progressing Towards Goal     Problem: Gas Exchange - Impaired  Goal: Absence of hypoxia  Outcome: Progressing Towards Goal     Problem: Breathing Pattern - Ineffective  Goal: Ability to achieve and maintain a regular respiratory rate  Outcome: Progressing Towards Goal     Problem:  Body Temperature -  Risk of, Imbalanced  Goal: Ability to maintain a body temperature within defined limits  Outcome: Progressing Towards Goal     Problem: Isolation Precautions - Risk of Spread of Infection  Goal: Prevent transmission of infectious organism to others  Outcome: Progressing Towards Goal     Problem: Risk for Fluid Volume Deficit  Goal: Maintain normal heart rhythm  Outcome: Progressing Towards Goal     Problem: Fatigue  Goal: Verbalize increase energy and improved vitality  Outcome: Progressing Towards Goal     Problem: Diabetes Self-Management  Goal: *Disease process and treatment process  Description: Define diabetes and identify own type of diabetes; list 3 options for treating diabetes. Outcome: Progressing Towards Goal  Goal: *Monitoring blood glucose, interpreting and using results  Description: Identify recommended blood glucose targets  and personal targets.   Outcome: Progressing Towards Goal

## 2022-04-22 NOTE — PROGRESS NOTES
Bedside shift change report given to MARTÍNEZ Rust (oncoming nurse) by Sandro Mcdaniel (offgoing nurse). Report included the following information SBAR, OR Summary, Procedure Summary, Intake/Output, MAR, Recent Results and Med Rec Status.

## 2022-04-22 NOTE — TELEPHONE ENCOUNTER
----- Message from Corinne Chang sent at 4/22/2022 11:22 AM EDT -----  Subject: Message to Provider    QUESTIONS  Information for Provider? Patient is asking if any cancelations . Please   reach out to patient she is asking for a sooner hospital follow up .   ---------------------------------------------------------------------------  --------------  [address]  What is the best way for the office to contact you? OK to leave message on   voicemail  Preferred Call Back Phone Number? [phone]  ---------------------------------------------------------------------------  --------------  SCRIPT ANSWERS  Relationship to Patient?  Self

## 2022-04-22 NOTE — DIABETES MGMT
[facility]    CLINICAL NURSE SPECIALIST CONSULT     Initial Presentation   Silver Condon is a 43 y.o. adult (identifies as female) who presented to the ED with positive home COVID-19 test 8 days ago now with a 5 day c/o chest pain, fatigue, N/V and SOB. In the ED, labs were significant for , , AG 10, GFR 52, A1C 11.8%, positive COVID-19 rapid test.     HX:   Past Medical History:   Diagnosis Date    Depression     Hypertension         INITIAL DX:   COVID-19 virus infection [U07.1]   HHS    Current Treatment     TX: IV fluids, basal/bolus insulin    Consulted by Zion Davalos MD for advanced diabetes nursing assessment and care for:   [x] Home management assessment  [x] Survival skill education    Hospital Course   Clinical progress has been uncomplicated. Diabetes History   History of pre-diabetes: A1C 6.1% 3/21 now 11.8%  No family hx diabetes  PCP: Maxwell Ashby MD    Diabetes Medication History  Key Antihyperglycemic Medications     Patient is on no antihyperglycemic meds. Diabetes self-management practices:   Eating pattern   [x] Breakfast Skips  [x] Lunch  [employer] or [employer] Take out (2a-5a)  [x] Dinner  Before bed (8-10a): Take out on the way home from work  [x] Snacks Works night shift- grazes on chips, crackers, fruit during her shift  [x] Beverages Regular soda, Gingerale  Physical activity pattern  Sedentary   Monitoring pattern  Never required to check BG before    Social determinants of health impacting diabetes self-management practices   Concerned that you need to know more about how to stay healthy with diabetes    Overall evaluation:    [x] Not achieving A1c target with drug therapy & self-care practices    Subjective   Via phone: Patient very grateful of care received and appreciative of efforts to help her understand her diabetes diagnosis. Had no questions re: education received re: survival skills for diabetes.  Only concerned with how she was going to know how much insulin to give herself. Assured her that we are working to see how much insulin requires and she will know how much she needs when discharged. Single  Former Tobacco smoker, current marijuana smoker  Works in [employer] for a [employer]- full time night shift  Born male, identifies as female, not on hormone replacement  Objective   Physical exam  General Obese AA female/male in no acute distress/ill-appearing. Conversant and cooperative  Neuro  Alert, oriented   Vital Signs   Visit Vitals  BP (!) 147/59 (BP 1 Location: Left upper arm, BP Patient Position: At rest)   Pulse 78   Temp 98.3 °F (36.8 °C)   Resp 23   Ht 5' 10\" (1.778 m)   Wt 154.3 kg (340 lb 1.6 oz)   SpO2 98%   BMI 48.80 kg/m²     Skin  Warm and dry. Acanthosis noted along neckline. Heart   Regular rate and rhythm. No murmurs, rubs or gallops  Lungs  Clear to auscultation without rales or rhonchi  Extremities No foot wounds        Laboratory  Recent Labs     22  0722 22  0555 22  2126 22  1726 22  1333 22  1333   GLU  --  336* 671* 671*   < > 891*   AGAP  --  7  --  10  --  10   TRIGL  --  133  --   --   --   --    WBC 4.4 6.8  --  9.0   < > 7.6   CREA  --  0.98  --  1.36*  --  1.78*   GFRNA  --  >60  --  59*  --  43*   AST  --   --   --   --   --  31   ALT  --   --   --   --   --  74    < > = values in this interval not displayed.        Factors impacting BG management  Factor Dose Comments   Nutrition:  Standard meals     60 grams/meal    Pain     Infection COVID-19    Other:   Kidney function   KARLA, GFR now >60      Blood glucose pattern      Significant diabetes-related events over the past 24-72 hours  A1C 11.8%  Moderate dose basal: 50 units daily  Bolus: 12 units with meals  19 units correctional humalog given since yesterday at noon  Fasting B  Pre-prandial B-303      Assessment and Plan   Nursing Diagnosis Risk for unstable blood glucose pattern   Nursing Intervention Domain 5250 Decision-making Support   Nursing Interventions Examined current inpatient diabetes/blood glucose control   Explored factors facilitating and impeding inpatient management  Explored corrective strategies with patient and responsible inpatient provider   Informed patient of rational for insulin strategy while hospitalized     Nursing Diagnosis 92537 Ineffective Health Management   Nursing Intervention Domain 5250 Decision-makingSupport   Nursing Interventions Identified diabetes self-management practices impeding diabetes control  Discussed diabetes survival skills related to  1. Healthy Plate eating plan; given handouts and discussed. Patient interested in drinking non-sugary drinks and less carb containing snacks  2. Role of physical activity in improving insulin sensitivity and action  3. Procedure for blood glucose monitoring   4. Medications plan at discharge: including metformin, long and short acting insulin. Patient simulated insulin PEN use. Evaluation   Jeniffer Tolentino is a 43year old transgender female, not on hormone therapy and with a history of pre-diabetes, who presented to the ED with acute hypoxic respiratory failure and dehydration s/t COVID-19 viral infection. In the ED, labs were significant for , , AG 10, GFR 52, A1C 11.8% consistent with HHS and new onset Type 2 Diabetes. She was fluid resuscitated and started on low dose basal/correctional insulin. Glucose did trend down to 336 and diet advanced  Insulin was increased to moderate weight based dosing but patient remained hyperglycemic s/t insulin resistance/covid-19 infection. .  Please increase insulin to target and inpatient glucose goal is 140-180mg/dl. Recommendations   1. POC glucose ACHS    2. Consistent carbohydrate diet (60 grams CHO/meal)    3.  Adjust the subcutaneous Insulin Order set (7467)  Insulin Dosing Specific recommendation    Basal (Based on weight, BMI & GFR) 50 units Lantus daily   If fasting BG over 180 tomorrow, increase dose to 0.5 units/kg/D   Nutritional                                       (Based on CHO/dextrose load) Moderate sensitivity: 12 units Humalog/meal    Hold if patient consumes less than 50% of carbohydrates on meal tray Advance by 2 units daily for   persistent pre-prandial   hyperglycemia     Corrective                          (Useful in adjusting insulin dosing) [x] Insulin-resistant sensitivity ACHS      5. Add Metformin 500mg BID    Discharge Recommendations   1. Will need a FUV with PCP within 1-2 weeks after hospital discharge for ongoing diabetes management     2. On Discharge, please place an outpatient order for \"diabetes education\" (enter as REF20). This will trigger a referral for the Program [facility] which includes outpatient diabetes self management training with a certified diabetes educator. 3. Prescription for glucometer kit (Meter, Lancets (100), Strips (100)). Patient to obtain a blood glucose reading four times daily. First thing in the morning prior to eating and drinking anything then before lunch, dinner and bedtime. Create a log and present to PCP for interpretation. 4. Metformin SR: 500mg BID, Will need dose titration with PCP     5. Glargine PEN: 50 units daily    6. Lispro PEN: 12 units with meal     7. Pen Needle, Diabetic 32 Gauge x 1/4\" (1 box)  Billing Code(s)   [x] 97454    Before making these care recommendations, I personally reviewed the hospitalization record, including notes, laboratory & diagnostic data and current medications, and examined the patient at the bedside (circumstances permitting) before making care recommendations. More than fifty (50) percent of the time was spent in patient counseling and/or care coordination.   Total minutes: 25 mins    MEENA Eckert  Diabetes Clinical Nurse Specialist  [facility]  Access via [facility] [employer]

## 2022-04-22 NOTE — PROGRESS NOTES
Hospital follow-up Virtual PCP transitional care appointment has been scheduled with Toy Das NP for Tuesday, 4/28/22 at 11:00 a.m. Pending patient discharge. Andrew Alatorre, Care Management Assistant.

## 2022-04-22 NOTE — DISCHARGE SUMMARY
Discharge Summary     Patient:  Kentrell Valentino       MRN: [id number]       YOB: 1985       Age: 43 y.o. Date of admission:  4/19/2022    Date of discharge:  4/22/2022    Primary care provider: Dr. Maxwell Moss MD    Admitting provider:  Andrei Brunner MD    Discharging provider:  Art Miller MD - [phone]  If unavailable, call [phone] and ask the  to page the triage hospitalist.    Consultations  · None    Procedures  · * No surgery found *    Discharge destination: HOME. The patient is stable for discharge. Admission diagnosis  · COVID-19 virus infection [U07.1]   · New Dx of Dm    Current Discharge Medication List      START taking these medications    Details   insulin glargine (Lantus Solostar U-100 Insulin) 100 unit/mL (3 mL) inpn 50 Units by SubCUTAneous route daily. Qty: 5 Pen, Refills: 0  Start date: 4/22/2022      insulin lispro (HUMALOG) 100 unit/mL kwikpen 12 Units by SubCUTAneous route Before breakfast, lunch, and dinner. Qty: 4 Pen, Refills: 0  Start date: 4/22/2022      metFORMIN ER (GLUCOPHAGE XR) 500 mg tablet Take 1 Tablet by mouth two (2) times a day.   Qty: 60 Tablet, Refills: 0  Start date: 4/22/2022      Blood-Glucose Meter monitoring kit USE QID  Qty: 1 Kit, Refills: 0  Start date: 4/22/2022      Insulin Needles, Disposable, (Pen Needles) 31 gauge x 1/4\" ndle USE AS DIRECTED for Lantus and Lispro injections  Qty: 100 Pen Needle, Refills: 0  Start date: 4/22/2022    Associated Diagnoses: Type 2 diabetes mellitus with hyperglycemia, with long-term current use of insulin (HCC)         CONTINUE these medications which have NOT CHANGED    Details   amLODIPine (NORVASC) 5 mg tablet Take 1 tablet by mouth once daily  Qty: 90 Tablet, Refills: 1    Associated Diagnoses: Essential hypertension      aspirin delayed-release 81 mg tablet Take 1 Tab by mouth daily.  Qty: 90 Tab, Refills: 1      lisinopril-hydroCHLOROthiazide (PRINZIDE, ZESTORETIC) 20-25 mg per tablet Take 1 tablet by mouth once daily  Qty: 90 Tablet, Refills: 1    Associated Diagnoses: Essential hypertension         STOP taking these medications       venlafaxine-SR (EFFEXOR-XR) 37.5 mg capsule Comments:   Reason for Stopping: Follow-up Information     Follow up With Specialties Details Why Contact Info    Program for Diabetes Health RDE Diabetes Schedule an appointment as soon as possible for a visit Call to schedule an apt with a certified diabetes  [facility]  [address] [address] [facility]    January Stroud MD Internal Medicine In 1 week Discharge follow up  [address] [place] [address]  [phone]            Final discharge diagnoses and brief hospital course  Please also refer to the admission H&P for details on the presenting problem. 24 yo female with PMHx of HTN, Depression admitted for fatigue, constant thirst. In ER found to have . Patient diagnosed with COVID 9 days prior to admission as well. Patient was diagnosed with new onset of Dm with A1c 11.8  and started on Insulin gtt. Diabetes educator consulted and provided extensive counseling and teaching. Patient was transitioned to Lantus 50U, Lispro 12U and Metformin 500mg BID. Patient given Rx for Glucometer and supplies as well. Patient had electrolyte abnormalities due to hyperglycemia which was corrected as well. Patient has asymptomatic COVID infection which did not require any treatment during hospitalization. Patient will continue with home BP medication as prior to admission. Patient now stable for discharge home.      Dx:  Non-ketotic Hyperosmolar state, resolved  New Diagnosis of DM type 2 likely, A1c 11.8  Hyponatremia/Hypokalemia -resolved  HTN      FOLLOW-UP TESTS recommended:   - Take Medication as prescribed for diabetes      PENDING TEST RESULTS:  At the time of your discharge the following test results are still pending: NONE  Please make sure you review these results with your outpatient follow-up provider(s).     SYMPTOMS to watch for: chest pain, shortness of breath, fever, chills, nausea, vomiting, diarrhea, change in mentation, falling, weakness, bleeding.     DIET/what to eat:  Diabetic Diet     ACTIVITY:  Activity as tolerated     WOUND CARE: NONE     EQUIPMENT needed:  NONE    Physical examination at discharge  Visit Vitals  BP (!) 147/59 (BP 1 Location: Left upper arm, BP Patient Position: At rest)   Pulse 87   Temp 98.3 °F (36.8 °C)   Resp 23   Ht 5' 10\" (1.778 m)   Wt 154.3 kg (340 lb 1.6 oz)   SpO2 98%   BMI 48.80 kg/m²     Ao3  Lung clear  CVS: RRR  Abd: soft NT ND Obese  Ext: no edema      Pertinent imaging studies:      Recent Labs     04/22/22  0722 04/20/22  0555 04/19/22  1726   WBC 4.4 6.8 9.0   HGB 14.3 12.4 16.1   HCT 41.0 35.2* 44.6   * 109* 101*     Recent Labs     04/20/22  0555 04/19/22  2126 04/19/22  1726 04/19/22  1333 04/19/22  1333   *  --  128*  --  123*   K 3.2*  --  3.8  --  3.9     --  94*  --  88*   CO2 26  --  24  --  25   BUN 15  --  15  --  15   CREA 0.98  --  1.36*  --  1.78*   * 671* 671*   < > 891*   CA 7.7*  --  8.8  --  9.0    < > = values in this interval not displayed. Recent Labs     04/19/22  1333      TP 7.9   ALB 3.2*   GLOB 4.7*     No results for input(s): INR, PTP, APTT, INREXT in the last 72 hours. No results for input(s): FE, TIBC, PSAT, FERR in the last 72 hours. No results for input(s): PH, PCO2, PO2 in the last 72 hours. No results for input(s): CPK, CKMB in the last 72 hours.     No lab exists for component: TROPONINI  No components found for: [place]    Chronic Diagnoses:    Problem List as of 4/22/2022 Date Reviewed: 2/23/2021          Codes Class Noted - Resolved    COVID-19 virus infection ICD-10-CM: U07.1  ICD-9-CM: 079.89  4/19/2022 - Present Prediabetes ICD-10-CM: R73.03  ICD-9-CM: 790.29  3/4/2021 - Present        ERRONEOUS ENCOUNTER--DISREGARD ICD-9-CM: 09896  12/9/2020 - Present        LVH (left ventricular hypertrophy) ICD-10-CM: I51.7  ICD-9-CM: 429.3  9/23/2019 - Present        Male-to-female transgender person ICD-10-CM: Z78.9  ICD-9-CM: V49.89  9/23/2019 - Present        Morbid obesity ([address] ICD-10-CM: E66.01  ICD-9-CM: 278.01  9/23/2019 - Present        Chest pain ICD-10-CM: R07.9  ICD-9-CM: 786.50  7/5/2017 - Present        Uncontrolled hypertension ICD-10-CM: I10  ICD-9-CM: 401.9  7/5/2017 - Present              Time spent on discharge related activities today greater than 30 minutes.       Signed:  Yvonne Francis MD                 Hospitalist, Internal Medicine      Cc: January Stroud MD

## 2022-04-22 NOTE — DISCHARGE INSTRUCTIONS
Please bring this form with you to show your primary care provider at your follow-up appointment. Primary care provider:  Dr. Carie Anderson MD    Discharging provider:  Elroy Stacy MD    You have been admitted to the hospital with the following diagnoses:  · COVID-19 virus infection [U07.1]   · New Diagnosis of Diabetes   · Asymptomatic COVID infection     FOLLOW-UP CARE RECOMMENDATIONS:    APPOINTMENTS:  Follow-up Information     Follow up With Specialties Details Why Contact Info    Program for Diabetes Health RDE Diabetes Schedule an appointment as soon as possible for a visit Call to schedule an apt with a certified diabetes  [address] 26[address] [facility]    Carie Anderson MD Internal Medicine In 1 week Discharge follow up  [address] [place] [address]  [phone]            FOLLOW-UP TESTS recommended:   - Take Medication as prescribed for diabetes     PENDING TEST RESULTS:  At the time of your discharge the following test results are still pending: NONE  Please make sure you review these results with your outpatient follow-up provider(s). SYMPTOMS to watch for: chest pain, shortness of breath, fever, chills, nausea, vomiting, diarrhea, change in mentation, falling, weakness, bleeding. DIET/what to eat:  Diabetic Diet    ACTIVITY:  Activity as tolerated    WOUND CARE: NONE    EQUIPMENT needed:  NONE      What to do if new or unexpected symptoms occur? If you experience any of the above symptoms (or should other concerns or questions arise after discharge) please call your primary care physician. Return to the emergency room if you cannot get hold of your doctor. · It is very important that you keep your follow-up appointment(s). · Please bring discharge papers, medication list (and/or medication bottles) to your follow-up appointments for review by your outpatient provider(s).   · Please check the list of medications and be sure it includes every medication (even non-prescription medications) that your provider wants you to take. · It is important that you take the medication exactly as they are prescribed. · Keep your medication in the bottles provided by the pharmacist and keep a list of the medication names, dosages, and times to be taken in your wallet. · Do not take other medications without consulting your doctor. · If you have any questions about your medications or other instructions, please talk to your nurse or care provider before you leave the hospital.    I understand that if any problems occur once I am at home I am to contact my physician. These instructions were explained to me and I had the opportunity to ask questions. Patient Education        Learning About Meal Planning for Diabetes  Why plan your meals? Meal planning can be a key part of managing diabetes. Planning meals and snacks with the right balance of carbohydrate, protein, and fat can help you keep your blood sugar at the target level you set with your doctor. You don't have to eat special foods. You can eat what your family eats, including sweets once in a while. But you do have to pay attention to how often you eat and how much you eat of certain foods. You may want to work with a dietitian or a diabetes educator. They can give you tips and meal ideas and can answer your questions about meal planning. This health professional can also help you reach a healthy weight if that is one of your goals. What plan is right for you? Your dietitian or diabetes educator may suggest that you start with the plate format or carbohydrate counting. The plate format  The plate format is a simple way to help you manage how you eat. You plan meals by learning how much space each food should take on a plate. Using the plate format helps you manage the amount of carbohydrate you eat.  It can make it easier to keep your blood sugar level within your target range. It also helps you see if you're eating healthy portion sizes. To use the plate format, you put non-starchy vegetables on half your plate. Add lean protein foods, such as fish, lean meats and poultry, or soy products, on one-quarter of the plate. Put a grain or starchy vegetable (such as brown rice or a potato) on the final quarter of the plate. You can add a small piece of fruit and some low-fat or fat-free milk or yogurt, depending on your carbohydrate goal for each meal.  Here are some tips for using the plate format:  · Make sure that you are not using an oversized plate. A 9-inch plate is best. Many restaurants use larger plates. · Get used to using the plate format at home. Then you can use it when you eat out. · Write down your questions about using the plate format. Talk to your doctor, a dietitian, or a diabetes educator about your concerns. Carbohydrate counting  With carbohydrate counting, you plan meals based on the amount of carbohydrate in each food. Carbohydrate raises blood sugar higher and more quickly than any other nutrient. It is found in desserts, breads and cereals, and fruit. It's also found in starchy vegetables such as potatoes and corn, grains such as rice and pasta, and milk and yogurt. You can help keep your blood sugar levels within your target range by planning how much carbohydrate to have at meals and snacks. The amount you need depends on several things. These include your weight, how active you are, which diabetes medicines you take, and what your goals are for your blood sugar levels. A registered dietitian or diabetes educator can help you plan how much carbohydrate to include in each meal and snack. An example of a carbohydrate counting plan is:  · 45 to 60 grams at each meal. That's about the same as 3 to 4 carbohydrate servings. · 15 to 20 grams at each snack. That's about the same as 1 carbohydrate serving.   The Nutrition Facts label on packaged foods tells you how much carbohydrate is in a serving of the food. First, look at the serving size on the food label. Is that the amount you eat in a serving? All of the nutrition information on a food label is based on that serving size. So if you eat more or less than that, you'll need to adjust the other numbers. Total carbohydrate is the next thing you need to look for on the label. If you count carbohydrate servings, one serving of carbohydrate is 15 grams. For foods that don't come with labels, such as fresh fruits and vegetables, you'll need a guide that lists carbohydrate in these foods. Ask your doctor, dietitian, or diabetes educator about books or other nutrition guides you can use. If you take insulin, you need to know how many grams of carbohydrate are in a meal. This lets you know how much rapid-acting insulin to take before you eat. If you use an insulin pump, you get a constant rate of insulin during the day. So the pump must be programmed at meals to give you extra insulin to cover the rise in blood sugar after meals. When you know how much carbohydrate you will eat, you can take the right amount of insulin. Or, if you always use the same amount of insulin, you need to make sure that you eat the same amount of carbohydrate at meals. If you need more help to understand carbohydrate counting and food labels, ask your doctor, dietitian, or diabetes educator. How can you plan healthy meals? Here are some tips to get started:  · Plan your meals a week at a time. Don't forget to include snacks too. · Use cookbooks or online recipes to plan several main meals. Plan some quick meals for busy nights. You also can double some recipes that freeze well. Then you can save half for other busy nights when you don't have time to cook. · Make sure you have the ingredients you need for your recipes. If you're running low on basic items, put these items on your shopping list too.   · List foods that you use to make breakfasts, lunches, and snacks. List plenty of fruits and vegetables. · Post this list on the refrigerator. Add to it as you think of more things you need. · Take the list to the store to do your weekly shopping. Follow-up care is a key part of your treatment and safety. Be sure to make and go to all appointments, and call your doctor if you are having problems. It's also a good idea to know your test results and keep a list of the medicines you take. Where can you learn more? Go to http://www.gray.com/  Enter Y859 in the search box to learn more about \"Learning About Meal Planning for Diabetes. \"  Current as of: September 8, 2021               Content Version: 13.2  © 3262-8868 [employer]. Care instructions adapted under license by [employer] (which disclaims liability or warranty for this information). If you have questions about a medical condition or this instruction, always ask your healthcare professional. [address] any warranty or liability for your use of this information.

## 2022-04-25 ENCOUNTER — PATIENT OUTREACH (OUTPATIENT)
Dept: CASE MANAGEMENT | Age: 37
End: 2022-04-25

## 2022-04-25 NOTE — PROGRESS NOTES
Care Transitions Initial Call    Call within 2 business days of discharge: Yes     Patient: Vinny Marcum Patient : 1985 MRN: [id number]    Last Discharge [address]       Complaint Diagnosis Description Type Department Provider    22 Positive For Covid-19 KARLA (acute kidney injury) ([facility] [address] . .. ED to Hosp-Admission (Discharged) (ADMIT) Brennan Bynum MD; Gustavo Klein. .. Was this an external facility discharge? No Discharge Facility: [facility]    Challenges to be reviewed by the provider   Additional needs identified to be addressed with provider: yes  medications- patient now diabetic and on humalog 3x a day 12 u and lantus 50 u daily. checking bs 4x a day and also on metformin bid         Method of communication with provider : chart routing    Discussed COVID-19 related testing which was home test positive at this time. Test results were home test pos. Patient informed of results, if available? no     Advance Care Planning:   Does patient have an Advance Directive:  currently not on file; patient declined education    Inpatient Readmission Risk score: Unplanned Readmit Risk Score: 7 ( )    Was this a readmission? no   Patient stated reason for the admission: short of breath and my blood sugar was really high    Patients top risk factors for readmission: medical condition-diabetes and medication management   Interventions to address risk factors: Scheduled appointment with PCP-22 and Obtained and reviewed discharge summary and/or continuity of care documents    Care Transition Nurse (CTN) contacted the patient by telephone to perform post hospital discharge assessment. Verified name and  with patient as identifiers. Provided introduction to self, and explanation of the CTN role. CTN reviewed discharge instructions, medical action plan and red flags with patient who verbalized understanding. Were discharge instructions available to patient? yes.  Reviewed appropriate site of care based on symptoms and resources available to patient including: PCP. Patient given an opportunity to ask questions and does not have any further questions or concerns at this time. The patient agrees to contact the PCP office for questions related to their healthcare. Medication reconciliation was performed with patient, who verbalizes understanding of administration of home medications. Advised obtaining a 90-day supply of all daily and as-needed medications. Referral to Pharm D needed: no     Home Health/Outpatient orders at discharge: none    Durable Medical Equipment ordered at discharge: Diabetic Supplies      Covid Risk Education    Educated patient about risk for severe COVID-19 due to risk factors according to CDC guidelines. CTN reviewed discharge instructions, medical action plan and red flag symptoms with the patient who verbalized understanding. Discussed COVID vaccination status: yes. Education provided on COVID-19 vaccination as appropriate. Discussed exposure protocols and quarantine with CDC Guidelines. Patient was given an opportunity to verbalize any questions and concerns and agrees to contact CTN or health care provider for questions related to their healthcare. Was patient discharged with a pulse oximeter? No, patient has ordered one. Will check bid. Discussed follow-up appointments. If no appointment was previously scheduled, appointment scheduling offered: yes. Is follow up appointment scheduled within 7 days of discharge? yes. [facility] follow up appointment(s):   Future Appointments   Date Time Provider [place]   4[facility]28[facility]2022 11:00 [facility]   5/9/2022  8:00 AM Marysol Novoa MD [facility] BS AMB     [facility] follow up appointment(s): none at this time. Plan for follow-up call in 5-7 days based on severity of symptoms and risk factors.   Plan for next call: follow up appointment-4/28/22  CTN provided contact information for future needs. Goals Addressed                 This Visit's Progress     Prevent complications post hospitalization. 4/25/22     Patient to check bs 4x a day and take lantus and humalog as prescribed.  Patient to attend pcp appt on 4/28/22   Patient to check pulse ox bid   Patient to watch carbs and concentrated sweets and keep a diary of meals   Ctn to follow up in one week.    [address] Catherine Cardozo RN

## 2022-04-25 NOTE — ACP (ADVANCE CARE PLANNING)
Advance Care Planning     General Advance Care Planning (ACP) Conversation      Date of Conversation: 4/25/2022  Conducted with: Patient with Decision Making Capacity    Healthcare Decision Maker:     Primary Decision Maker: Ari Morales - [phone]  Click here to complete [address] including selection of the Healthcare Decision Maker Relationship (ie \"Primary\")          Content/Action Overview:   DECLINED ACP conversation - will revisit periodically   Reviewed DNR/DNI and patient elects Full Code (Attempt Resuscitation)         Length of Voluntary ACP Conversation in minutes:  <16 minutes (Non-Billable)    Abby Lucio RN

## 2022-04-28 ENCOUNTER — VIRTUAL VISIT (OUTPATIENT)
Dept: INTERNAL MEDICINE CLINIC | Age: 37
End: 2022-04-28
Payer: COMMERCIAL

## 2022-04-28 DIAGNOSIS — U07.1 COVID-19: Primary | ICD-10-CM

## 2022-04-28 DIAGNOSIS — E11.65 TYPE 2 DIABETES MELLITUS WITH HYPERGLYCEMIA, WITH LONG-TERM CURRENT USE OF INSULIN (HCC): ICD-10-CM

## 2022-04-28 DIAGNOSIS — Z79.4 TYPE 2 DIABETES MELLITUS WITH HYPERGLYCEMIA, WITH LONG-TERM CURRENT USE OF INSULIN (HCC): ICD-10-CM

## 2022-04-28 PROCEDURE — 99213 OFFICE O/P EST LOW 20 MIN: CPT | Performed by: NURSE PRACTITIONER

## 2022-04-28 NOTE — PROGRESS NOTES
Chief Complaint   Patient presents with   [facility] Follow Up     [facility]- covid-19 - sx has gotten better, still having SOB when walking, also dx w/ diabetes     New Order     continous glucometer    Other     text link [phone]     Pt denies pain at this time    1. \"Have you been to the ER, urgent care clinic since your last visit? Hospitalized since your last visit? \" Yes When: 4/19/2022-4/22/2022 Where: [facility] Reason for visit: covid-19     2. \"Have you seen or consulted any other health care providers outside of the [address] [place] [address] [place] since your last visit? \" No     3. For patients aged 39-70: Has the patient had a colonoscopy / FIT/ Cologuard? No      If the patient is female:    4. For patients aged 41-77: Has the patient had a mammogram within the past 2 years? No      5. For patients aged 21-65: Has the patient had a pap smear?  No

## 2022-04-28 NOTE — PROGRESS NOTES
Vivek Roper is a 43 y.o. adult who was seen by synchronous (real-time) audio-video technology on 4/28/2022 for Hospital Follow Up [facility]- covid-19 - sx has gotten better, still having SOB when walking, also dx w/ diabetes ), New Order (continous glucometer), and Other (text link [phone])        Assessment & Plan:   Diagnoses and all orders for this visit:    1. COVID-19    2. Type 2 diabetes mellitus with hyperglycemia, with long-term current use of insulin (HCC)  -     REFERRAL TO DIABETIC EDUCATION  -     AMB SUPPLY ORDER      The complexity of medical decision making for this visit is moderate     She has fu w usual provider in May    Subjective:       Prior to Admission medications    Medication Sig Start Date End Date Taking? Authorizing Provider   insulin glargine (Lantus Solostar U-100 Insulin) 100 unit/mL (3 mL) inpn 50 Units by SubCUTAneous route daily. 4/22/22  Yes Sri Rodriguez MD   insulin lispro (HUMALOG) 100 unit/mL kwikpen 12 Units by SubCUTAneous route Before breakfast, lunch, and dinner. 4/22/22  Yes Sri Rodriguez MD   metFORMIN ER (GLUCOPHAGE XR) 500 mg tablet Take 1 Tablet by mouth two (2) times a day. 4/22/22  Yes Sri Rodriguez MD   lisinopril-hydroCHLOROthiazide (PRINZIDE, ZESTORETIC) 20-25 mg per tablet Take 1 tablet by mouth once daily 10/1/21  Yes Rosi Green MD   amLODIPine (NORVASC) 5 mg tablet Take 1 tablet by mouth once daily 10/1/21  Yes Rosi Green MD   aspirin delayed-release 81 mg tablet Take 1 Tab by mouth daily.  2/23/21  Yes Rosi Green MD   Blood-Glucose Meter monitoring kit USE QID 4/22/22   Sri Rodriguez MD   Insulin Needles, Disposable, (Pen Needles) 31 gauge x 1/4\" ndle USE AS DIRECTED for Lantus and Lispro injections 4/22/22   MD OLU Dupont     Diabetic Review of Systems - newly diagnosed medication compliance: compliant all of the time, diabetic diet compliance: compliant most of the time, home glucose monitoring: is performed regularly. 119 this morning- generally in the mid 100s  She asks for CGM bc it hurts to stick her finger so much  +interest in DM education    covid-19: she was dx on 4/19  Feeling much better apart from some STANLEY  No leg swelling, chest or back pain  Overall improvement  rtw on monday            Objective:   No flowsheet data found. [INSTRUCTIONS:  \"[x]\" Indicates a positive item  \"[]\" Indicates a negative item  -- DELETE ALL ITEMS NOT EXAMINED]    Constitutional: [x] Appears well-developed and well-nourished [x] No apparent distress      [] Abnormal -     Mental status: [x] Alert and awake  [x] Oriented to person/place/time [x] Able to follow commands    [] Abnormal -     Eyes:   EOM    [x]  Normal    [] Abnormal -   Sclera  [x]  Normal    [] Abnormal -          Discharge [x]  None visible   [] Abnormal -     HENT: [x] Normocephalic, atraumatic  [] Abnormal -   [x] Mouth/Throat: Mucous membranes are moist    External Ears [x] Normal  [] Abnormal -    Neck: [x] No visualized mass [] Abnormal -     Pulmonary/Chest: [x] Respiratory effort normal   [x] No visualized signs of difficulty breathing or respiratory distress        [] Abnormal -      Musculoskeletal:   [x] Normal gait with no signs of ataxia         [x] Normal range of motion of neck        [] Abnormal -     Neurological:        [x] No Facial Asymmetry (Cranial nerve 7 motor function) (limited exam due to video visit)          [x] No gaze palsy        [] Abnormal -          Skin:        [x] No significant exanthematous lesions or discoloration noted on facial skin         [] Abnormal -            Psychiatric:       [x] Normal Affect [] Abnormal -        [x] No Hallucinations    Other pertinent observable physical exam findings:-        We discussed the expected course, resolution and complications of the diagnosis(es) in detail.   Medication risks, benefits, costs, interactions, and alternatives were discussed as indicated. I advised her to contact the office if her condition worsens, changes or fails to improve as anticipated. She expressed understanding with the diagnosis(es) and plan. Drinda Sandhoff, was evaluated through a synchronous (real-time) audio-video encounter. The patient (or guardian if applicable) is aware that this is a billable service, which includes applicable co-pays. Verbal consent to proceed has been obtained. The visit was conducted pursuant to the emergency declaration under the [address] [place] [address] authority and the [employer] General Act. Patient identification was verified, and a caregiver was present when appropriate. The patient was located at home in a state where the provider was licensed to provide care. Cara Zhao NP

## 2022-04-28 NOTE — LETTER
NOTIFICATION RETURN TO WORK / SCHOOL    4/28/2022 10:30 AM    Ms. Jacky Fitch  [address] [id number]      To Whom It May Concern:    Jacky Fitch is currently under the care of Eleazar Gore. She will return to work/school on: 5/2/22 unless she gets positive covid test over the weekend. If there are questions or concerns please have the patient contact our office. Sincerely,      Derick Mata.  Charissa Kwong NP

## 2022-05-04 ENCOUNTER — PATIENT OUTREACH (OUTPATIENT)
Dept: CASE MANAGEMENT | Age: 37
End: 2022-05-04

## 2022-05-05 NOTE — PROGRESS NOTES
Care Transitions Outreach Attempt    Call within 2 business days of discharge: Yes   Attempted to reach patient for transitions of care follow up. Unable to reach patient. Patient: Clarice Camejo Patient : 1985 MRN: [id number]    [facility]       Complaint Diagnosis Description Type Department Provider    22 Positive For Covid-19 KARLA (acute kidney injury) ([facility] . .. ED to Hosp-Admission (Discharged) (ADMIT) Tabitha Rodriguez MD; Joann Pimentel. .. Was this an external facility discharge? No Discharge Facility: [facility]      Noted following upcoming appointments from discharge chart review:   [facility](s):   Future Appointments   Date Time Provider Joseph Akosua   2022  8:00 AM [facility] MD [facility]     Non-BS follow up appointment(s): none at this time  Ctn will attempt outreach in one week.     Iglesia Schneider RN, CPN - Care Transition Nurse- [phone]

## 2022-05-09 ENCOUNTER — OFFICE VISIT (OUTPATIENT)
Dept: INTERNAL MEDICINE CLINIC | Age: 37
End: 2022-05-09
Payer: COMMERCIAL

## 2022-05-09 VITALS
BODY MASS INDEX: 45.1 KG/M2 | SYSTOLIC BLOOD PRESSURE: 129 MMHG | HEIGHT: 70 IN | DIASTOLIC BLOOD PRESSURE: 82 MMHG | WEIGHT: 315 LBS | TEMPERATURE: 98.2 F | RESPIRATION RATE: 18 BRPM | HEART RATE: 62 BPM

## 2022-05-09 DIAGNOSIS — E66.01 MORBID OBESITY (HCC): ICD-10-CM

## 2022-05-09 DIAGNOSIS — E11.65 TYPE 2 DIABETES MELLITUS WITH HYPERGLYCEMIA, WITH LONG-TERM CURRENT USE OF INSULIN (HCC): Primary | ICD-10-CM

## 2022-05-09 DIAGNOSIS — I51.7 LVH (LEFT VENTRICULAR HYPERTROPHY): ICD-10-CM

## 2022-05-09 DIAGNOSIS — I15.2 HYPERTENSION COMPLICATING DIABETES (HCC): ICD-10-CM

## 2022-05-09 DIAGNOSIS — Z79.4 TYPE 2 DIABETES MELLITUS WITH HYPERGLYCEMIA, WITH LONG-TERM CURRENT USE OF INSULIN (HCC): Primary | ICD-10-CM

## 2022-05-09 DIAGNOSIS — Z78.9 MALE-TO-FEMALE TRANSGENDER PERSON: ICD-10-CM

## 2022-05-09 DIAGNOSIS — E11.59 HYPERTENSION COMPLICATING DIABETES (HCC): ICD-10-CM

## 2022-05-09 PROBLEM — I10 HYPERTENSION COMPLICATING DIABETES (HCC): Status: ACTIVE | Noted: 2017-07-05

## 2022-05-09 PROBLEM — E11.9 HYPERTENSION COMPLICATING DIABETES (HCC): Status: ACTIVE | Noted: 2017-07-05

## 2022-05-09 LAB — GLUCOSE POC: 109 MG/DL

## 2022-05-09 PROCEDURE — 82962 GLUCOSE BLOOD TEST: CPT | Performed by: INTERNAL MEDICINE

## 2022-05-09 PROCEDURE — 99214 OFFICE O/P EST MOD 30 MIN: CPT | Performed by: INTERNAL MEDICINE

## 2022-05-09 RX ORDER — FLASH GLUCOSE SENSOR
KIT MISCELLANEOUS
Qty: 2 KIT | Refills: 5 | Status: SHIPPED | OUTPATIENT
Start: 2022-05-09

## 2022-05-09 RX ORDER — FLASH GLUCOSE SCANNING READER
EACH MISCELLANEOUS
Qty: 1 EACH | Refills: 5 | Status: SHIPPED | OUTPATIENT
Start: 2022-05-09

## 2022-05-09 RX ORDER — INSULIN LISPRO 100 [IU]/ML
8 INJECTION, SOLUTION INTRAVENOUS; SUBCUTANEOUS
Qty: 4 PEN | Refills: 0
Start: 2022-05-09 | End: 2022-07-28 | Stop reason: SDUPTHER

## 2022-05-09 RX ORDER — ALBUTEROL SULFATE 90 UG/1
1 AEROSOL, METERED RESPIRATORY (INHALATION)
Qty: 18 G | Refills: 1 | Status: SHIPPED | OUTPATIENT
Start: 2022-05-09

## 2022-05-09 RX ORDER — AMLODIPINE BESYLATE 5 MG/1
5 TABLET ORAL DAILY
Qty: 90 TABLET | Refills: 1 | Status: SHIPPED | OUTPATIENT
Start: 2022-05-09

## 2022-05-09 RX ORDER — LISINOPRIL AND HYDROCHLOROTHIAZIDE 20; 25 MG/1; MG/1
1 TABLET ORAL DAILY
Qty: 90 TABLET | Refills: 1 | Status: SHIPPED | OUTPATIENT
Start: 2022-05-09

## 2022-05-09 NOTE — PROGRESS NOTES
Chief Complaint   Patient presents with    Follow-up    Medication Evaluation     insulin, pt is concerned glucose is going to drop too low by taking insulin during meals     Request For New Medication     inhaler        1. \"Have you been to the ER, urgent care clinic since your last visit? Hospitalized since your last visit? \" Yes When: 4/19/22 Where: [facility] Reason for visit: covid-19     2. \"Have you seen or consulted any other health care providers outside of the [address] [place] [address] since your last visit? \" No     3. For patients aged 39-70: Has the patient had a colonoscopy / FIT/ Cologuard? NA - based on age      If the patient is female:    4. For patients aged 41-77: Has the patient had a mammogram within the past 2 years? NA - based on age or sex      11. For patients aged 21-65: Has the patient had a pap smear?  NA - based on age or sex

## 2022-05-09 NOTE — PROGRESS NOTES
Shimon Laughlin is a 43 y.o. adult and presents with Follow-up, Medication Evaluation (insulin, pt is concerned glucose is going to drop too low by taking insulin during meals ), and Request For New Medication (inhaler )  . Subjective:  MS. MCCLELLAN- pt is transgender female. Last appt w me 2/2021. Pt was hospitalized in April-dx'ed w covid and DM. Type 2 DM-pt is on lantus/humalog 12U w meals and metformin   -pt reports her glucoses have been  on current regimen   -she has been falling asleep at work and fears her glucoses have been low   -pt denies snoring    Lab Results   Component Value Date/Time    Hemoglobin A1c 11.8 (H) 04/20/2022 05:55 AM    Hemoglobin A1c (POC) 5.5 10/31/2014 11:03 AM     Lab Results   Component Value Date/Time    Glucose 336 (H) 04/20/2022 05:55 AM    Glucose (POC) 282 (H) 04/22/2022 12:05 PM    Glucose  05/09/2022 08:10 AM           HTN  BP Readings from Last 3 Encounters:   05/09/22 129/82   04/22/22 132/80   02/23/21 (!) 154/99         Pt relays she has a h/o depression and was previously on sertraline w improved mood. She stopped it due to wt gain and felt better. Pt relays she has been having recurrent sxs of depression. She will accept a therapists info, declines psychiatry. Will retry sertraline    Morbid obesity-pt is UNinterested in bariatric surgery.      Review of Systems  Constitutional: negative for fevers, chills, anorexia and weight loss  Respiratory:  negative for cough, hemoptysis, dyspnea,wheezing  CV:   negative for chest pain, palpitations, lower extremity edema  GI:   negative for nausea, vomiting, diarrhea, abdominal pain,melena  Endo:               negative for polyuria,polydipsia,polyphagia,heat intolerance  Musculoskel: negative for myalgias, arthralgias, back pain, muscle weakness, joint pain  Neurological:  negative for headaches, dizziness, vertigo, memory problems and gait   Behavl/Psych: positive for feelings of anxiety, depression, mood changes denies SI    Past Medical History:   Diagnosis Date    Depression     Hypertension      History reviewed. No pertinent surgical history. Social History     Socioeconomic History    Marital status: SINGLE   Tobacco Use    Smoking status: Former Smoker    Smokeless tobacco: Never Used   Vaping Use    Vaping Use: Never used   Substance and Sexual Activity    Alcohol use: Yes     Comment: socially    Drug use: No     Types: Marijuana    Sexual activity: Not Currently     Family History   Problem Relation Age of Onset    Hypertension Mother     Hypertension Father      Current Outpatient Medications   Medication Sig Dispense Refill    amLODIPine (NORVASC) 5 mg tablet Take 1 Tablet by mouth daily. 90 Tablet 1    lisinopril-hydroCHLOROthiazide (PRINZIDE, ZESTORETIC) 20-25 mg per tablet Take 1 Tablet by mouth daily. 90 Tablet 1    flash glucose sensor (FreeStyle Steff 2 Sensor) kit Use as directed Dx E11.9 2 Kit 5    flash glucose scanning reader (FreeStyle Steff 2 [place]) misc Use as directed Dx E11.9 1 Each 5    insulin glargine (Lantus Solostar U-100 Insulin) 100 unit/mL (3 mL) inpn 50 Units by SubCUTAneous route daily. 5 Pen 0    insulin lispro (HUMALOG) 100 unit/mL kwikpen 12 Units by SubCUTAneous route Before breakfast, lunch, and dinner. 4 Pen 0    metFORMIN ER (GLUCOPHAGE XR) 500 mg tablet Take 1 Tablet by mouth two (2) times a day. 60 Tablet 0    aspirin delayed-release 81 mg tablet Take 1 Tab by mouth daily.  90 Tab 1    Blood-Glucose Meter monitoring kit USE QID 1 Kit 0    Insulin Needles, Disposable, (Pen Needles) 31 gauge x 1/4\" ndle USE AS DIRECTED for Lantus and Lispro injections 100 Pen Needle 0     No Known Allergies    Objective:  Visit Vitals  /82 (BP 1 Location: Left arm, BP Patient Position: Sitting, BP Cuff Size: Thigh)   Pulse 62   Temp 98.2 °F (36.8 °C) (Temporal)   Resp 18   Ht 5' 10\" (1.778 m)   Wt (!) 370 lb (167.8 kg)   BMI 53.09 kg/m² Physical Exam:   General appearance - alert,  and in no distress morbidly obese  Mental status - alert, oriented to person, place, and time  EYE-EOMI  Chest - clear to auscultation, no wheezes, rales or rhonchi, symmetric air entry   Heart - normal rate, regular rhythm, normal S1, S2, +ectopy and S4 + 2/6 systolic murmur  Abdomen - obese  Ext-peripheral pulses normal, no pedal edema, no clubbing or cyanosis  Skin-Warm and dry. no hyperpigmentation, vitiligo, or suspicious lesions  Neuro -alert, oriented, normal speech, no focal findings or movement disorder noted  Neck-normal C-spine, no tenderness, full ROM without pain        Results for orders placed or performed in visit on 05/09/22   AMB POC GLUCOSE BLOOD, BY GLUCOSE MONITORING DEVICE   Result Value Ref Range    Glucose  MG/DL       Assessment/Plan:    ICD-10-CM ICD-9-CM    1. Type 2 diabetes mellitus with hyperglycemia, with long-term current use of insulin ([facility])  E11.65 250.00 AMB POC GLUCOSE BLOOD, BY GLUCOSE MONITORING DEVICE    Z79.4 790.29 REFERRAL TO DIABETIC EDUCATION     V58.67 flash glucose sensor (FreeStyle Steff 2 Sensor) kit      flash glucose scanning reader (FreeStyle Steff 2 [place]) misc   2. Essential hypertension  I10 401.9 amLODIPine (NORVASC) 5 mg tablet      lisinopril-hydroCHLOROthiazide (PRINZIDE, ZESTORETIC) 20-25 mg per tablet     Orders Placed This Encounter    REFERRAL TO DIABETIC EDUCATION     Referral Priority:   Routine     Referral Type:   Consultation     Referral Reason:   Specialty Services Required     Number of Visits Requested:   1    AMB POC GLUCOSE BLOOD, BY GLUCOSE MONITORING DEVICE    amLODIPine (NORVASC) 5 mg tablet     Sig: Take 1 Tablet by mouth daily. Dispense:  90 Tablet     Refill:  1    lisinopril-hydroCHLOROthiazide (PRINZIDE, ZESTORETIC) 20-25 mg per tablet     Sig: Take 1 Tablet by mouth daily.      Dispense:  90 Tablet     Refill:  1    flash glucose sensor (FreeStyle Steff 2 Sensor) kit Sig: Use as directed Dx E11.9     Dispense:  2 Kit     Refill:  5    flash glucose scanning reader (FreeStyle Steff 2 [place]) misc     Sig: Use as directed Dx E11.9     Dispense:  1 Each     Refill:  5   1. Type 2 diabetes mellitus with hyperglycemia, with long-term current use of insulin (formerly [facility])  Will decrease humalog to 8U   - AMB POC GLUCOSE BLOOD, BY GLUCOSE MONITORING DEVICE  - REFERRAL TO DIABETIC EDUCATION  - flash glucose sensor (FreeStyle Steff 2 Sensor) kit; Use as directed Dx E11.9  Dispense: 2 Kit; Refill: 5  - flash glucose scanning reader (FreeStyle Steff 2 [place]) misc; Use as directed Dx E11.9  Dispense: 1 Each; Refill: 5  - REFERRAL TO ENDOCRINOLOGY    2. Hypertension complicating diabetes (formerly [facility])  Controlled  - amLODIPine (NORVASC) 5 mg tablet; Take 1 Tablet by mouth daily. Dispense: 90 Tablet; Refill: 1  - lisinopril-hydroCHLOROthiazide (PRINZIDE, ZESTORETIC) 20-25 mg per tablet; Take 1 Tablet by mouth daily. Dispense: 90 Tablet; Refill: 1    3. Male-to-female transgender person  Noted  - REFERRAL TO ENDOCRINOLOGY    4. Morbid obesity ([address]  D/w pt daily walking (at least 20 minutes), healthy diet w fruits and/or veggies w each meal, portion sizes and weight loss  I suspect pt has NISHA    5. LVH (left ventricular hypertrophy)  Noted        There are no Patient Instructions on file for this visit. I have reviewed with the patient details of the assessment and plan and all questions were answered. Relevent patient education was performed. The most recent lab findings were reviewed with the patient. An After Visit Summary was printed and given to the patient.

## 2022-05-20 ENCOUNTER — PATIENT OUTREACH (OUTPATIENT)
Dept: CASE MANAGEMENT | Age: 37
End: 2022-05-20

## 2022-05-20 DIAGNOSIS — E11.65 TYPE 2 DIABETES MELLITUS WITH HYPERGLYCEMIA, WITH LONG-TERM CURRENT USE OF INSULIN (HCC): ICD-10-CM

## 2022-05-20 DIAGNOSIS — Z79.4 TYPE 2 DIABETES MELLITUS WITH HYPERGLYCEMIA, WITH LONG-TERM CURRENT USE OF INSULIN (HCC): ICD-10-CM

## 2022-05-20 RX ORDER — PEN NEEDLE, DIABETIC 29 G X1/2"
NEEDLE, DISPOSABLE MISCELLANEOUS
Qty: 100 PEN NEEDLE | Refills: 11 | Status: SHIPPED | OUTPATIENT
Start: 2022-05-20

## 2022-05-20 NOTE — TELEPHONE ENCOUNTER
----- Message from Noam Adame sent at 5/20/2022 11:19 AM EDT -----  Subject: Message to Provider    QUESTIONS  Information for Provider? Patient was seen in hospital for three days, [facility] April 19-22nd and needs more needles and test strips for her   diabetes. Hospital sent her home with some, and she has run out.   ---------------------------------------------------------------------------  --------------  [address]  What is the best way for the office to contact you? Do not leave any   message, patient will call back for answer  Preferred Call Back Phone Number? [phone]  ---------------------------------------------------------------------------  --------------  SCRIPT ANSWERS  Relationship to Patient?  Self

## 2022-05-20 NOTE — PROGRESS NOTES
Care Transitions Follow Up Call    Challenges to be reviewed by the provider   Additional needs identified to be addressed with provider: yes  medications- patient needs refills on needles and test strips           Method of communication with provider : chart routing    Care Transition Nurse (CTN) contacted the patient by telephone to follow up after admission on 22. Verified name and  with patient as identifiers. Addressed changes since last contact: medications- change in humalog from 12 units down to 8 units  Follow up appointment completed? yes. Was follow up appointment scheduled within 7 days of discharge? yes. CTN reviewed discharge instructions, medical action plan and red flags with patient and discussed any barriers to care and/or understanding of plan of care after discharge. Discussed appropriate site of care based on symptoms and resources available to patient including: Specialist. The patient agrees to contact the PCP office for questions related to their healthcare. Patients top risk factors for readmission: depression, medication management and polypharmacy   Interventions to address risk factors: Obtained and reviewed discharge summary and/or continuity of care documents    [facility] follow up appointment(s):   Future Appointments   Date Time Provider Joseph South   2022 10:15 AM Maxwell Moss MD [facility]     [facility] follow up appointment(s): none at this time    CTN provided contact information for future needs. Plan for follow-up call in 5-7 days based on severity of symptoms and risk factors. Plan for next call: follow up appointment-dietician consult, graduation call,      Goals Addressed                 This Visit's Progress     Prevent complications post hospitalization. On track     22     Patient to check bs 4x a day and take lantus and humalog as prescribed.     Patient to attend pcp appt on 22   Patient to check pulse ox bid   Patient to watch carbs and concentrated sweets and keep a diary of meals   Ctn to follow up in one week.  Ed Aguayo RN    5/20/22     Patient to use freestyle tai to check blood sugars 4 times a day   Patient to watch carbs and concentrated sweets and keep a diary of meals   Ctn to follow up in one week.    [address] [facility] [address]

## 2022-06-02 ENCOUNTER — VIRTUAL VISIT (OUTPATIENT)
Dept: INTERNAL MEDICINE CLINIC | Age: 37
End: 2022-06-02
Payer: COMMERCIAL

## 2022-06-02 DIAGNOSIS — E11.65 TYPE 2 DIABETES MELLITUS WITH HYPERGLYCEMIA, WITH LONG-TERM CURRENT USE OF INSULIN (HCC): Primary | ICD-10-CM

## 2022-06-02 DIAGNOSIS — E11.59 HYPERTENSION COMPLICATING DIABETES (HCC): ICD-10-CM

## 2022-06-02 DIAGNOSIS — Z79.4 TYPE 2 DIABETES MELLITUS WITH HYPERGLYCEMIA, WITH LONG-TERM CURRENT USE OF INSULIN (HCC): Primary | ICD-10-CM

## 2022-06-02 DIAGNOSIS — I15.2 HYPERTENSION COMPLICATING DIABETES (HCC): ICD-10-CM

## 2022-06-02 PROCEDURE — 99214 OFFICE O/P EST MOD 30 MIN: CPT | Performed by: INTERNAL MEDICINE

## 2022-06-02 RX ORDER — INSULIN GLARGINE 100 [IU]/ML
50 INJECTION, SOLUTION SUBCUTANEOUS DAILY
Qty: 5 ADJUSTABLE DOSE PRE-FILLED PEN SYRINGE | Refills: 1 | Status: SHIPPED | OUTPATIENT
Start: 2022-06-02 | End: 2022-07-29

## 2022-06-02 NOTE — PROGRESS NOTES
Pamella Kohler is a 43 y.o. adult who was seen by synchronous (real-time) audio-video technology on 6/2/2022 for Follow-up (DM- pt states lantus is too expensive ) and Other (text link )        Assessment & Plan:   1. Type 2 diabetes mellitus with hyperglycemia, with long-term current use of insulin (HCC)  Change to basaglar  Pt urged to contact me if basaglar is $$    - insulin glargine (Basaglar KwikPen U-100 Insulin) 100 unit/mL (3 mL) inpn; 50 Units by SubCUTAneous route daily. Dispense: 5 Adjustable Dose Pre-filled Pen Syringe; Refill: 1    2. Hypertension complicating diabetes ([address]  F/u in office  712  Subjective:     Pt reports she has not been taking her lantus x 1 week bc she was quoted $400 for the rx. Gloucoses have been in the low 100s      PMH:   Type 2 DM-pt is on lantus/humalog 12U w meals and metformin   -pt reports her glucoses have been  on current regimen   -she has been falling asleep at work and fears her glucoses have been low   -pt denies snoring    Lab Results   Component Value Date/Time    Hemoglobin A1c 11.8 (H) 04/20/2022 05:55 AM    Hemoglobin A1c (POC) 5.5 10/31/2014 11:03 AM     Lab Results   Component Value Date/Time    Glucose 336 (H) 04/20/2022 05:55 AM    Glucose (POC) 282 (H) 04/22/2022 12:05 PM    Glucose  05/09/2022 08:10 AM           HTN  BP Readings from Last 3 Encounters:   05/09/22 129/82   04/22/22 132/80   02/23/21 (!) 154/99         Pt relays she has a h/o depression and was previously on sertraline w improved mood. She stopped it due to wt gain and felt better. Pt relays she has been having recurrent sxs of depression. She will accept a therapists info, declines psychiatry. Will retry sertraline    Morbid obesity-pt is UNinterested in bariatric surgery. Prior to Admission medications    Medication Sig Start Date End Date Taking?  Authorizing Provider   insulin glargine (Basaglar KwikPen U-100 Insulin) 100 unit/mL (3 mL) inpn 50 Units by SubCUTAneous route daily. 6/2/22  Yes Brayden Gutierrez MD   metFORMIN ER (GLUCOPHAGE XR) 500 mg tablet Take 1 Tablet by mouth two (2) times a day. 5/25/22  Yes Brayden Gutierrez MD   amLODIPine (NORVASC) 5 mg tablet Take 1 Tablet by mouth daily. 5/9/22  Yes Brayden Gutierrez MD   lisinopril-hydroCHLOROthiazide (PRINZIDE, ZESTORETIC) 20-25 mg per tablet Take 1 Tablet by mouth daily. 5/9/22  Yes Brayden Gutierrez MD   insulin lispro (HUMALOG) 100 unit/mL kwikpen 8 Units by SubCUTAneous route Before breakfast, lunch, and dinner. 5/9/22  Yes Brayden Gutierrez MD   albuterol (PROVENTIL HFA, VENTOLIN HFA, PROAIR HFA) 90 mcg/actuation inhaler Take 1 Puff by inhalation every six (6) hours as needed for Wheezing. 5/9/22  Yes Brayden Gutierrez MD   aspirin delayed-release 81 mg tablet Take 1 Tab by mouth daily. 2/23/21  Yes Brayden Gutierrez MD   insulin glargine (Lantus Solostar U-100 Insulin) 100 unit/mL (3 mL) inpn 50 Units by SubCUTAneous route daily. Patient not taking: Reported on 6/2/2022 5/25/22 6/2/22  Brayden Gutierrez MD   Insulin Needles, Disposable, (Pen Needles) 31 gauge x 1/4\" ndle USE AS DIRECTED for Lantus and Lispro injections 5/20/22   Brayden Gutierrez MD   flash glucose sensor (FreeStyle [place] 2 Sensor) kit Use as directed Dx E11.9 5/9/22   Brayden Gutierrez MD   flash glucose scanning reader [facility] 2 [place]) misc Use as directed Dx E11.9 5/9/22   Brayden Gutierrez MD   Blood-Glucose Meter monitoring kit USE QID 4/22/22   Divya Ramos MD     Review of systems (12) negative, except noted above. Objective:   No flowsheet data found.    General: alert, cooperative, no distress   Mental  status: normal mood, behavior, speech, dress, motor activity, and thought processes, able to follow commands   HENT: NCAT   Neck: no visualized mass   Resp: no respiratory distress   Neuro: no gross deficits   Skin: no discoloration or lesions of concern on visible areas Psychiatric: normal affect, consistent with stated mood, no evidence of hallucinations     Additional exam findings: We discussed the expected course, resolution and complications of the diagnosis(es) in detail. Medication risks, benefits, costs, interactions, and alternatives were discussed as indicated. I advised her to contact the office if her condition worsens, changes or fails to improve as anticipated. She expressed understanding with the diagnosis(es) and plan. Marcy Vallejo, was evaluated through a synchronous (real-time) audio-video encounter. The patient (or guardian if applicable) is aware that this is a billable service, which includes applicable co-pays. This Virtual Visit was conducted with patient's (and/or legal guardian's) consent. The visit was conducted pursuant to the emergency declaration under the [address] [place] [address] authority and the [employer] [address] [address] and [employer] General Act. Patient identification was verified, and a caregiver was present when appropriate.   The patient was located at: Home: [address] [place], [place] [address]Kayleigh Hinojosa [facility] [id number]  The provider was located at: Home:         Margi Hatfield MD

## 2022-06-02 NOTE — PROGRESS NOTES
Chief Complaint   Patient presents with    Follow-up     DM- pt states lantus is too expensive     Other     text link        1. \"Have you been to the ER, urgent care clinic since your last visit? Hospitalized since your last visit? \" No    2. \"Have you seen or consulted any other health care providers outside of the [address] [place] [address] since your last visit? \" No     3. For patients aged 39-70: Has the patient had a colonoscopy / FIT/ Cologuard? NA - based on age      If the patient is female:    4. For patients aged 41-77: Has the patient had a mammogram within the past 2 years? NA - based on age or sex      11. For patients aged 21-65: Has the patient had a pap smear?  NA - based on age or sex

## 2022-06-07 ENCOUNTER — PATIENT OUTREACH (OUTPATIENT)
Dept: CASE MANAGEMENT | Age: 37
End: 2022-06-07

## 2022-06-07 NOTE — PROGRESS NOTES
Patient has graduated from the Transitions of Care Coordination  program on 6/7/22. Patient/family has the ability to self-manage at this time Care management goals have been completed. Patient was not referred to the [facility] team for further management. Goals Addressed                 This Visit's Progress     COMPLETED: Prevent complications post hospitalization. On track     4/25/22     Patient to check bs 4x a day and take lantus and humalog as prescribed.  Patient to attend pcp appt on 4/28/22   Patient to check pulse ox bid   Patient to watch carbs and concentrated sweets and keep a diary of meals   Ctn to follow up in one week.  Simone Wayne RN    5/20/22     Patient to use freestyle tai to check blood sugars 4 times a day   Patient to watch carbs and concentrated sweets and keep a diary of meals   Ctn to follow up in one week. Edwina Cardozo RN    6/7/22   Patient has had no readmissions for 30 days. Edwina Cardozo RN              Patient has Care Transition Nurse's contact information for any further questions, concerns, or needs. Patients upcoming visits:  No future appointments.

## 2022-07-20 ENCOUNTER — HOSPITAL ENCOUNTER (EMERGENCY)
Age: 37
Discharge: HOME | End: 2022-07-20
Attending: EMERGENCY MEDICINE
Payer: COMMERCIAL

## 2022-07-20 ENCOUNTER — APPOINTMENT (OUTPATIENT)
Dept: VASCULAR SURGERY | Age: 37
End: 2022-07-20
Attending: EMERGENCY MEDICINE
Payer: COMMERCIAL

## 2022-07-20 VITALS
RESPIRATION RATE: 20 BRPM | OXYGEN SATURATION: 98 % | DIASTOLIC BLOOD PRESSURE: 77 MMHG | HEART RATE: 76 BPM | SYSTOLIC BLOOD PRESSURE: 127 MMHG | TEMPERATURE: 98.6 F

## 2022-07-20 DIAGNOSIS — R20.0 NUMBNESS AND TINGLING IN LEFT ARM: Primary | ICD-10-CM

## 2022-07-20 DIAGNOSIS — R20.2 NUMBNESS AND TINGLING IN LEFT ARM: Primary | ICD-10-CM

## 2022-07-20 DIAGNOSIS — R07.9 CHEST PAIN, UNSPECIFIED TYPE: ICD-10-CM

## 2022-07-20 LAB
ALBUMIN SERPL-MCNC: 3 G/DL (ref 3.5–5)
ALBUMIN/GLOB SERPL: 0.7 {RATIO} (ref 1.1–2.2)
ALP SERPL-CCNC: 63 U/L (ref 45–117)
ALT SERPL-CCNC: 53 U/L (ref 12–78)
ANION GAP SERPL CALC-SCNC: 5 MMOL/L (ref 5–15)
AST SERPL-CCNC: 38 U/L (ref 15–37)
BASOPHILS # BLD: 0.1 K/UL (ref 0–0.1)
BASOPHILS NFR BLD: 1 % (ref 0–1)
BILIRUB SERPL-MCNC: 0.3 MG/DL (ref 0.2–1)
BUN SERPL-MCNC: 12 MG/DL (ref 6–20)
BUN/CREAT SERPL: 14 (ref 12–20)
CALCIUM SERPL-MCNC: 8.6 MG/DL (ref 8.5–10.1)
CHLORIDE SERPL-SCNC: 105 MMOL/L (ref 97–108)
CO2 SERPL-SCNC: 29 MMOL/L (ref 21–32)
COMMENT, HOLDF: NORMAL
CREAT SERPL-MCNC: 0.86 MG/DL (ref 0.7–1.3)
DIFFERENTIAL METHOD BLD: ABNORMAL
EOSINOPHIL # BLD: 0.3 K/UL (ref 0–0.4)
EOSINOPHIL NFR BLD: 4 % (ref 0–7)
ERYTHROCYTE [DISTWIDTH] IN BLOOD BY AUTOMATED COUNT: 13.2 % (ref 11.5–14.5)
GLOBULIN SER CALC-MCNC: 4.5 G/DL (ref 2–4)
GLUCOSE SERPL-MCNC: 138 MG/DL (ref 65–100)
HCT VFR BLD AUTO: 43.9 % (ref 36.6–50.3)
HGB BLD-MCNC: 14.9 G/DL (ref 12.1–17)
IMM GRANULOCYTES # BLD AUTO: 0 K/UL (ref 0–0.04)
IMM GRANULOCYTES NFR BLD AUTO: 0 % (ref 0–0.5)
LYMPHOCYTES # BLD: 4 K/UL (ref 0.8–3.5)
LYMPHOCYTES NFR BLD: 53 % (ref 12–49)
MCH RBC QN AUTO: 31.4 PG (ref 26–34)
MCHC RBC AUTO-ENTMCNC: 33.9 G/DL (ref 30–36.5)
MCV RBC AUTO: 92.6 FL (ref 80–99)
MONOCYTES # BLD: 0.8 K/UL (ref 0–1)
MONOCYTES NFR BLD: 10 % (ref 5–13)
NEUTS SEG # BLD: 2.4 K/UL (ref 1.8–8)
NEUTS SEG NFR BLD: 32 % (ref 32–75)
NRBC # BLD: 0 K/UL (ref 0–0.01)
NRBC BLD-RTO: 0 PER 100 WBC
PLATELET # BLD AUTO: 162 K/UL (ref 150–400)
PMV BLD AUTO: 12.1 FL (ref 8.9–12.9)
POTASSIUM SERPL-SCNC: 3.5 MMOL/L (ref 3.5–5.1)
PROT SERPL-MCNC: 7.5 G/DL (ref 6.4–8.2)
RBC # BLD AUTO: 4.74 M/UL (ref 4.1–5.7)
SAMPLES BEING HELD,HOLD: NORMAL
SODIUM SERPL-SCNC: 139 MMOL/L (ref 136–145)
TROPONIN-HIGH SENSITIVITY: 17 NG/L (ref 0–76)
WBC # BLD AUTO: 7.5 K/UL (ref 4.1–11.1)

## 2022-07-20 PROCEDURE — 84484 ASSAY OF TROPONIN QUANT: CPT

## 2022-07-20 PROCEDURE — 93005 ELECTROCARDIOGRAM TRACING: CPT

## 2022-07-20 PROCEDURE — 36415 COLL VENOUS BLD VENIPUNCTURE: CPT

## 2022-07-20 PROCEDURE — 99284 EMERGENCY DEPT VISIT MOD MDM: CPT

## 2022-07-20 PROCEDURE — 93971 EXTREMITY STUDY: CPT

## 2022-07-20 PROCEDURE — 80053 COMPREHEN METABOLIC PANEL: CPT

## 2022-07-20 PROCEDURE — 85025 COMPLETE CBC W/AUTO DIFF WBC: CPT

## 2022-07-20 NOTE — ED TRIAGE NOTES
Patient ambulated through triage with complaints of left sided chest pain that radiated down her arm, reports that 7/10 and describes it as sharp. These symptoms started a week ago and has waxed and waned since.  Patient denies sob, n/v.

## 2022-07-20 NOTE — ED PROVIDER NOTES
Tingling sensation up and down L arm. Symptoms come and go over past week. Tingling comes out of nowhere. Described as a pins and needle sensation. Occurs several times per day. This is often associated with a chest heaviness or pressure. There is no exacerbating or alleviating factors. She is taken no medications for the symptoms. Is painful for short time. No cough, fever, vomiting. NO sob. Past Medical History:   Diagnosis Date    Depression     Hypertension        No past surgical history on file. Family History:   Problem Relation Age of Onset    Hypertension Mother     Hypertension Father        Social History     Socioeconomic History    Marital status: SINGLE     Spouse name: Not on file    Number of children: Not on file    Years of education: Not on file    Highest education level: Not on file   Occupational History    Not on file   Tobacco Use    Smoking status: Former    Smokeless tobacco: Never   Vaping Use    Vaping Use: Never used   Substance and Sexual Activity    Alcohol use: Yes     Comment: socially    Drug use: No     Types: Marijuana    Sexual activity: Not Currently   Other Topics Concern    Not on file   Social History Narrative    Not on file     Social Determinants of Health     Financial Resource Strain: Not on file   Food Insecurity: Not on file   Transportation Needs: Not on file   Physical Activity: Not on file   Stress: Not on file   Social Connections: Not on file   Intimate Partner Violence: Not on file   Housing Stability: Not on file         ALLERGIES: Patient has no known allergies. Review of Systems   Constitutional:  Negative for fever. HENT:  Negative for facial swelling. Eyes:  Negative for visual disturbance. Respiratory:  Negative for chest tightness. Cardiovascular:  Negative for chest pain. Gastrointestinal:  Negative for abdominal pain. Genitourinary:  Negative for difficulty urinating and dysuria.    Musculoskeletal:  Negative for arthralgias. Skin:  Negative for rash. Neurological:  Negative for headaches. Hematological:  Negative for adenopathy. Psychiatric/Behavioral:  Negative for suicidal ideas. Vitals:    07/20/22 1446   BP: (!) 164/112   Pulse: 89   Resp: 18   Temp: 98.4 °F (36.9 °C)   SpO2: 99%            Physical Exam  Vitals and nursing note reviewed. Constitutional:       General: She is not in acute distress. Appearance: She is well-developed. HENT:      Head: Normocephalic and atraumatic. Eyes:      General: No scleral icterus. Conjunctiva/sclera: Conjunctivae normal.      Pupils: Pupils are equal, round, and reactive to light. Cardiovascular:      Rate and Rhythm: Normal rate. Heart sounds: No murmur heard. Pulmonary:      Effort: Pulmonary effort is normal. No respiratory distress. Abdominal:      General: There is no distension. Musculoskeletal:         General: Normal range of motion. Cervical back: Normal range of motion and neck supple. Skin:     General: Skin is warm and dry. Findings: No rash. Neurological:      Mental Status: She is alert and oriented to person, place, and time. MDM  Number of Diagnoses or Management Options  Chest pain, unspecified type  Numbness and tingling in left arm  Diagnosis management comments: Patient's cardiac evaluation in the ER was unremarkable. Her EKG and troponin were reassuring. Rest of her blood work was unremarkable. Vital signs remained stable. She did D-dimer chest as well that was normal.  She has been resting comfortably with no complaints at this time. Her symptoms could be radicular neuropathy radiating into the left arm. But no signs of any weakness or swelling. Stable for discharge home and treat symptomatically. She can follow-up with her primary care doctor. She can also return to the ER if she has any worsening symptoms.        Amount and/or Complexity of Data Reviewed  Clinical lab tests: reviewed  Tests in the radiology section of CPT®: reviewed  Tests in the medicine section of CPT®: reviewed      ED Course as of 07/20/22 2301   Wed Jul 20, 2022   1454 G obtained at 66 65 76 showing sinus rhythm with premature atrial complexes, rate 94, normal intervals, [JE]      ED Course User Index  [JE] Valente Tran MD       Procedures

## 2022-07-20 NOTE — Clinical Note
[address]  [address] [facility] [address]  [phone]    Work/School Note    Date: 7/20/2022    To Whom It May concern:      Michael Brown was seen and treated today in the emergency room by the following provider(s):  Attending Provider: Magaly Zapata MD.      Michael Brown is excused from work/school on 07/20/22. She is clear to return to work/school on 07/21/22.         Sincerely,          Neris Taylor MD

## 2022-07-22 LAB
ATRIAL RATE: 94 BPM
CALCULATED P AXIS, ECG09: 57 DEGREES
CALCULATED R AXIS, ECG10: 3 DEGREES
CALCULATED T AXIS, ECG11: 55 DEGREES
DIAGNOSIS, 93000: NORMAL
P-R INTERVAL, ECG05: 156 MS
Q-T INTERVAL, ECG07: 352 MS
QRS DURATION, ECG06: 88 MS
QTC CALCULATION (BEZET), ECG08: 440 MS
VENTRICULAR RATE, ECG03: 94 BPM

## 2022-11-10 DIAGNOSIS — E11.59 HYPERTENSION COMPLICATING DIABETES (HCC): ICD-10-CM

## 2022-11-10 DIAGNOSIS — Z79.4 TYPE 2 DIABETES MELLITUS WITH HYPERGLYCEMIA, WITH LONG-TERM CURRENT USE OF INSULIN (HCC): ICD-10-CM

## 2022-11-10 DIAGNOSIS — E11.65 TYPE 2 DIABETES MELLITUS WITH HYPERGLYCEMIA, WITH LONG-TERM CURRENT USE OF INSULIN (HCC): ICD-10-CM

## 2022-11-10 DIAGNOSIS — I15.2 HYPERTENSION COMPLICATING DIABETES (HCC): ICD-10-CM

## 2022-11-10 RX ORDER — AMLODIPINE BESYLATE 5 MG/1
5 TABLET ORAL DAILY
Qty: 90 TABLET | Refills: 0 | Status: SHIPPED | OUTPATIENT
Start: 2022-11-10

## 2022-11-10 RX ORDER — LISINOPRIL AND HYDROCHLOROTHIAZIDE 20; 25 MG/1; MG/1
1 TABLET ORAL DAILY
Qty: 90 TABLET | Refills: 0 | Status: SHIPPED | OUTPATIENT
Start: 2022-11-10

## 2022-11-10 RX ORDER — PEN NEEDLE, DIABETIC 29 G X1/2"
NEEDLE, DISPOSABLE MISCELLANEOUS
Qty: 100 PEN NEEDLE | Refills: 11 | Status: CANCELLED | OUTPATIENT
Start: 2022-11-10

## 2022-11-10 NOTE — TELEPHONE ENCOUNTER
Duplicate request: Pen Needles #100 with 11 refills was sent to [address] on 05/20/2022. Last visit 06/02/2022 Virtual visit MD Cb Dobbins   Next appointment No show 08/17/2022 - Nothing rescheduled   Previous refill encounter(s)   05/09/2022:  - Norvasc #90 with 1 refill,   - Prinzide #90 with 1 refill. For Pharmacy Admin Tracking Only    Intervention Detail: Discontinued Rx: 1, reason: Duplicate Therapy and New Rx: 1, reason: Patient Preference  Time Spent (min): 5        Requested Prescriptions     Pending Prescriptions Disp Refills    amLODIPine (NORVASC) 5 mg tablet 90 Tablet 0     Sig: Take 1 Tablet by mouth daily. lisinopril-hydroCHLOROthiazide (PRINZIDE, ZESTORETIC) 20-25 mg per tablet 90 Tablet 0     Sig: Take 1 Tablet by mouth daily.     Insulin Needles, Disposable, (Pen Needles) 31 gauge x 1/4\" ndle 100 Pen Needle 11     Sig: USE AS DIRECTED for Lantus and Lispro injections

## 2022-11-21 RX ORDER — METFORMIN HYDROCHLORIDE 500 MG/1
500 TABLET, EXTENDED RELEASE ORAL 2 TIMES DAILY
Qty: 180 TABLET | Refills: 1 | Status: SHIPPED | OUTPATIENT
Start: 2022-11-21

## 2022-11-21 NOTE — TELEPHONE ENCOUNTER
Future Appointments:  No future appointments. Last Appointment With Me:  8/17/2022     Requested Prescriptions     Pending Prescriptions Disp Refills    metFORMIN ER (GLUCOPHAGE XR) 500 mg tablet 180 Tablet 1     Sig: Take 1 Tablet by mouth two (2) times a day.

## 2023-02-28 DIAGNOSIS — E11.65 TYPE 2 DIABETES MELLITUS WITH HYPERGLYCEMIA, WITH LONG-TERM CURRENT USE OF INSULIN (HCC): ICD-10-CM

## 2023-02-28 DIAGNOSIS — Z79.4 TYPE 2 DIABETES MELLITUS WITH HYPERGLYCEMIA, WITH LONG-TERM CURRENT USE OF INSULIN (HCC): ICD-10-CM

## 2023-02-28 RX ORDER — INSULIN GLARGINE 100 [IU]/ML
50 INJECTION, SOLUTION SUBCUTANEOUS DAILY
Qty: 5 ADJUSTABLE DOSE PRE-FILLED PEN SYRINGE | Refills: 0 | Status: SHIPPED | OUTPATIENT
Start: 2023-02-28

## 2023-02-28 NOTE — TELEPHONE ENCOUNTER
Please contact patient for scheduling. Thanks    Writer sent  pt a message via [facility]    ------------------------------------  Last visit 2022 Virtual visit MD Fred Acosta   Next appointment No show 2023 - Nothing rescheduled   Previous refill encounter(s)   2022 Basaglar #5 pens with 1 refill. For Pharmacy Admin Tracking Only    Program: Medication Refill  Intervention Detail: New Rx: 1, reason: Patient Preference  Time Spent (min): 5      Requested Prescriptions     Pending Prescriptions Disp Refills    insulin glargine (Basaglar KwikPen U-100 Insulin) 100 unit/mL (3 mL) inpn 5 Adjustable Dose Pre-filled Pen Syringe 0     Si Units by SubCUTAneous route daily.

## 2023-05-09 ENCOUNTER — TELEPHONE (OUTPATIENT)
Facility: CLINIC | Age: 38
End: 2023-05-09

## 2023-05-09 RX ORDER — AMLODIPINE BESYLATE 5 MG/1
TABLET ORAL
Qty: 30 TABLET | Refills: 0 | Status: SHIPPED | OUTPATIENT
Start: 2023-05-09 | End: 2023-06-09

## 2023-05-09 RX ORDER — INSULIN GLARGINE 100 [IU]/ML
INJECTION, SOLUTION SUBCUTANEOUS
Qty: 1 ADJUSTABLE DOSE PRE-FILLED PEN SYRINGE | Refills: 0 | Status: SHIPPED | OUTPATIENT
Start: 2023-05-09 | End: 2023-06-09

## 2023-05-09 RX ORDER — LISINOPRIL AND HYDROCHLOROTHIAZIDE 25; 20 MG/1; MG/1
TABLET ORAL
Qty: 30 TABLET | Refills: 0 | Status: SHIPPED | OUTPATIENT
Start: 2023-05-09 | End: 2023-06-09

## 2023-05-15 ENCOUNTER — TELEMEDICINE (OUTPATIENT)
Facility: CLINIC | Age: 38
End: 2023-05-15
Payer: COMMERCIAL

## 2023-05-15 DIAGNOSIS — I15.2 HYPERTENSION COMPLICATING DIABETES (HCC): ICD-10-CM

## 2023-05-15 DIAGNOSIS — Z79.4 TYPE 2 DIABETES MELLITUS WITH HYPERGLYCEMIA, WITH LONG-TERM CURRENT USE OF INSULIN (HCC): Primary | ICD-10-CM

## 2023-05-15 DIAGNOSIS — E11.65 TYPE 2 DIABETES MELLITUS WITH HYPERGLYCEMIA, WITH LONG-TERM CURRENT USE OF INSULIN (HCC): Primary | ICD-10-CM

## 2023-05-15 DIAGNOSIS — F39 MOOD DISORDER (HCC): ICD-10-CM

## 2023-05-15 DIAGNOSIS — E66.01 MORBID OBESITY (HCC): ICD-10-CM

## 2023-05-15 DIAGNOSIS — E11.59 HYPERTENSION COMPLICATING DIABETES (HCC): ICD-10-CM

## 2023-05-15 DIAGNOSIS — Z78.9 MALE-TO-FEMALE TRANSGENDER PERSON: ICD-10-CM

## 2023-05-15 PROCEDURE — 99214 OFFICE O/P EST MOD 30 MIN: CPT | Performed by: INTERNAL MEDICINE

## 2023-05-15 RX ORDER — SERTRALINE HYDROCHLORIDE 100 MG/1
100 TABLET, FILM COATED ORAL DAILY
Qty: 30 TABLET | Refills: 2 | Status: SHIPPED | OUTPATIENT
Start: 2023-05-15

## 2023-05-15 ASSESSMENT — PATIENT HEALTH QUESTIONNAIRE - PHQ9
SUM OF ALL RESPONSES TO PHQ9 QUESTIONS 1 & 2: 0
1. LITTLE INTEREST OR PLEASURE IN DOING THINGS: 0
SUM OF ALL RESPONSES TO PHQ QUESTIONS 1-9: 0
SUM OF ALL RESPONSES TO PHQ QUESTIONS 1-9: 0
2. FEELING DOWN, DEPRESSED OR HOPELESS: 0
SUM OF ALL RESPONSES TO PHQ QUESTIONS 1-9: 0
SUM OF ALL RESPONSES TO PHQ QUESTIONS 1-9: 0

## 2023-05-15 NOTE — PROGRESS NOTES
Chief Complaint   Patient presents with    Diabetes    Hypertension     1. Have you been to the ER, urgent care clinic since your last visit? Hospitalized since your last visit? No    2. Have you seen or consulted any other health care providers outside of the [address] [place] [address] since your last visit? Include any pap smears or colon screening.  No

## 2023-05-15 NOTE — PROGRESS NOTES
Cruzito Murcia is a 43 y.o. adult who was seen by synchronous (real-time) audio-video technology on 6/2/2022 for Follow-up (DM- pt states lantus is too expensive ) and Other (text link )        Assessment & Plan:   1. Type 2 diabetes mellitus with hyperglycemia, with long-term current use of insulin (HCC)    - Continuous Blood Gluc  (FREESTYLE LOLA 2 READER) FELISHA; Use as directed  Dispense: 2 each; Refill: 5  - External Referral To Endocrinology    2. Male-to-female transgender person    - External Referral To Endocrinology    3. Hypertension complicating diabetes ([facility] [address]  Check bp in office    4. Morbid obesity ([facility] [address]  Will refer to bariatrics [place]      712  Subjective:     Last appt 5/2022    Pt is currently in [place] for work. Pt is interested in bariatric surgery. Pt needs a new endocrinologist-she was dismissed from her previous one due to missed appts. PMH:   Type 2 DM-pt is on lantus/humalog 8U w meals and metformin       Lab Results   Component Value Date/Time    Hemoglobin A1c 11.8 (H) 04/20/2022 05:55 AM    Hemoglobin A1c (POC) 5.5 10/31/2014 11:03 AM     Lab Results   Component Value Date/Time    Glucose 336 (H) 04/20/2022 05:55 AM    Glucose (POC) 282 (H) 04/22/2022 12:05 PM    Glucose  05/09/2022 08:10 AM       HTN  BP Readings from Last 3 Encounters:   05/09/22 129/82        Pt relays she has a h/o depression and was previously on sertraline w improved mood. She stopped it due to wt gain and felt better. Pt relays she has been having recurrent sxs of depression. She will accept a therapists info, declines psychiatry. Will retry sertraline    Morbid obesity-pt is now contemplating bariatric surgery. Review of systems (12) negative, except noted above. Objective:   No flowsheet data found.    General: alert, cooperative, no distress   Mental  status: normal mood, behavior, speech, dress, motor activity, and thought processes, able to follow commands   HENT: NCAT   Neck: no

## 2023-05-30 RX ORDER — INSULIN LISPRO 100 [IU]/ML
INJECTION, SOLUTION INTRAVENOUS; SUBCUTANEOUS
Qty: 10 ML | Refills: 0 | Status: SHIPPED | OUTPATIENT
Start: 2023-05-30

## 2023-06-09 RX ORDER — AMLODIPINE BESYLATE 5 MG/1
TABLET ORAL
Qty: 90 TABLET | Refills: 0 | Status: SHIPPED | OUTPATIENT
Start: 2023-06-09

## 2023-06-09 RX ORDER — INSULIN GLARGINE 100 [IU]/ML
INJECTION, SOLUTION SUBCUTANEOUS
Qty: 15 ML | Refills: 0 | Status: SHIPPED | OUTPATIENT
Start: 2023-06-09

## 2023-06-09 RX ORDER — LISINOPRIL AND HYDROCHLOROTHIAZIDE 25; 20 MG/1; MG/1
TABLET ORAL
Qty: 90 TABLET | Refills: 0 | Status: SHIPPED | OUTPATIENT
Start: 2023-06-09

## 2023-06-19 ENCOUNTER — TELEPHONE (OUTPATIENT)
Facility: CLINIC | Age: 38
End: 2023-06-19

## 2023-06-19 DIAGNOSIS — Z79.4 TYPE 2 DIABETES MELLITUS WITH HYPERGLYCEMIA, WITH LONG-TERM CURRENT USE OF INSULIN (HCC): Primary | ICD-10-CM

## 2023-06-19 DIAGNOSIS — E11.65 TYPE 2 DIABETES MELLITUS WITH HYPERGLYCEMIA, WITH LONG-TERM CURRENT USE OF INSULIN (HCC): Primary | ICD-10-CM

## 2023-06-19 RX ORDER — INSULIN LISPRO 100 [IU]/ML
8 INJECTION, SOLUTION INTRAVENOUS; SUBCUTANEOUS
Qty: 15 ML | Refills: 0 | Status: SHIPPED | OUTPATIENT
Start: 2023-06-19

## 2023-07-12 ENCOUNTER — OFFICE VISIT (OUTPATIENT)
Facility: CLINIC | Age: 38
End: 2023-07-12
Payer: COMMERCIAL

## 2023-07-12 VITALS
DIASTOLIC BLOOD PRESSURE: 72 MMHG | RESPIRATION RATE: 18 BRPM | BODY MASS INDEX: 45.1 KG/M2 | WEIGHT: 315 LBS | OXYGEN SATURATION: 96 % | HEART RATE: 72 BPM | SYSTOLIC BLOOD PRESSURE: 106 MMHG | HEIGHT: 70 IN | TEMPERATURE: 97.6 F

## 2023-07-12 DIAGNOSIS — Z78.9 MALE-TO-FEMALE TRANSGENDER PERSON: ICD-10-CM

## 2023-07-12 DIAGNOSIS — Z79.4 TYPE 2 DIABETES MELLITUS WITH HYPERGLYCEMIA, WITH LONG-TERM CURRENT USE OF INSULIN (HCC): Primary | ICD-10-CM

## 2023-07-12 DIAGNOSIS — E11.59 HYPERTENSION COMPLICATING DIABETES (HCC): ICD-10-CM

## 2023-07-12 DIAGNOSIS — L68.0 HIRSUTISM: ICD-10-CM

## 2023-07-12 DIAGNOSIS — I15.2 HYPERTENSION COMPLICATING DIABETES (HCC): ICD-10-CM

## 2023-07-12 DIAGNOSIS — E66.01 MORBID OBESITY (HCC): ICD-10-CM

## 2023-07-12 DIAGNOSIS — E11.65 TYPE 2 DIABETES MELLITUS WITH HYPERGLYCEMIA, WITH LONG-TERM CURRENT USE OF INSULIN (HCC): Primary | ICD-10-CM

## 2023-07-12 DIAGNOSIS — Z79.4 LONG TERM (CURRENT) USE OF INSULIN (HCC): ICD-10-CM

## 2023-07-12 PROBLEM — R07.9 CHEST PAIN: Status: RESOLVED | Noted: 2017-07-05 | Resolved: 2023-07-12

## 2023-07-12 PROBLEM — R73.03 PREDIABETES: Status: RESOLVED | Noted: 2021-03-04 | Resolved: 2023-07-12

## 2023-07-12 PROBLEM — U07.1 COVID-19 VIRUS INFECTION: Status: RESOLVED | Noted: 2022-04-19 | Resolved: 2023-07-12

## 2023-07-12 LAB — HBA1C MFR BLD: 7.5 %

## 2023-07-12 PROCEDURE — 3074F SYST BP LT 130 MM HG: CPT | Performed by: INTERNAL MEDICINE

## 2023-07-12 PROCEDURE — 83036 HEMOGLOBIN GLYCOSYLATED A1C: CPT | Performed by: INTERNAL MEDICINE

## 2023-07-12 PROCEDURE — 99214 OFFICE O/P EST MOD 30 MIN: CPT | Performed by: INTERNAL MEDICINE

## 2023-07-12 PROCEDURE — 3078F DIAST BP <80 MM HG: CPT | Performed by: INTERNAL MEDICINE

## 2023-07-12 RX ORDER — INSULIN LISPRO 100 [IU]/ML
INJECTION, SOLUTION INTRAVENOUS; SUBCUTANEOUS
Qty: 10 ML | Refills: 5 | Status: SHIPPED | OUTPATIENT
Start: 2023-07-12

## 2023-07-12 RX ORDER — SEMAGLUTIDE 0.68 MG/ML
0.25 INJECTION, SOLUTION SUBCUTANEOUS WEEKLY
Qty: 9 ML | Refills: 0 | Status: SHIPPED | OUTPATIENT
Start: 2023-07-12 | End: 2023-08-14

## 2023-07-12 RX ORDER — INSULIN GLARGINE 100 [IU]/ML
INJECTION, SOLUTION SUBCUTANEOUS
Qty: 15 ML | Refills: 5 | Status: SHIPPED | OUTPATIENT
Start: 2023-07-12

## 2023-07-12 ASSESSMENT — PATIENT HEALTH QUESTIONNAIRE - PHQ9
SUM OF ALL RESPONSES TO PHQ QUESTIONS 1-9: 0
SUM OF ALL RESPONSES TO PHQ9 QUESTIONS 1 & 2: 0
SUM OF ALL RESPONSES TO PHQ QUESTIONS 1-9: 0
1. LITTLE INTEREST OR PLEASURE IN DOING THINGS: 0
SUM OF ALL RESPONSES TO PHQ QUESTIONS 1-9: 0
SUM OF ALL RESPONSES TO PHQ QUESTIONS 1-9: 0
2. FEELING DOWN, DEPRESSED OR HOPELESS: 0

## 2023-07-12 NOTE — PROGRESS NOTES
Padmini Soto is a 43 y.o. adult and presents with   Chief Complaint   Patient presents with    Diabetes       . Subjective:  MS. CHASE- pt is transgender female. Last IN OFFICE appt w me 5/2022. Pt requests endo referral for hormone management       Type 2 DM-pt is on lantus 50 /humalog 8U w meals and metformin   -pt reports her glucoses have been  on current regimen   -she has been falling asleep at work    -pt denies snoring    Hemoglobin A1C, POC   Date Value Ref Range Status   07/12/2023 7.5 % Final        HTN  BP Readings from Last 3 Encounters:   07/12/23 106/72   05/09/22 129/82          Pt relays she has a h/o depression and was previously on sertraline w improved mood. She stopped it due to wt gain and felt better. Pt relays she has been having recurrent sxs of depression. She will accept a therapists info, declines psychiatry. Will retry sertraline    Morbid obesity-pt is UNinterested in bariatric surgery.    Wt Readings from Last 3 Encounters:   07/12/23 (!) 379 lb (171.9 kg)   05/09/22 (!) 370 lb (167.8 kg)      Review of Systems  Constitutional: negative for fevers, chills, anorexia and weight loss  Respiratory:  negative for cough, hemoptysis, dyspnea,wheezing  CV:   negative for chest pain, palpitations, lower extremity edema  GI:   negative for nausea, vomiting, diarrhea, abdominal pain,melena  Endo:               negative for polyuria,polydipsia,polyphagia,heat intolerance  Musculoskel: negative for myalgias, arthralgias, back pain, muscle weakness, joint pain  Neurological:  negative for headaches, dizziness, vertigo, memory problems and gait   Behavl/Psych: negative for feelings of anxiety, depression, mood changes denies SI    Current Outpatient Medications on File Prior to Visit   Medication Sig Dispense Refill    lisinopril-hydroCHLOROthiazide (PRINZIDE;ZESTORETIC) 20-25 MG per tablet Take 1 tablet by mouth once daily 90 tablet 0    amLODIPine (NORVASC) 5 MG tablet Take 1

## 2023-07-13 ENCOUNTER — TELEPHONE (OUTPATIENT)
Facility: CLINIC | Age: 38
End: 2023-07-13

## 2023-07-13 NOTE — TELEPHONE ENCOUNTER
Patient called stating that the pharmacy she asked these rx to be faxed to still hasn't received it, and would like someone to check on them/send them in at earliest time possible.      insulin lispro (HUMALOG) 100 UNIT/ML SOLN injection vial   insulin glargine (LANTUS SOLOSTAR) 100 UNIT/ML injection pen

## 2023-07-17 ENCOUNTER — TELEPHONE (OUTPATIENT)
Facility: CLINIC | Age: 38
End: 2023-07-17

## 2023-07-21 RX ORDER — METFORMIN HYDROCHLORIDE 500 MG/1
500 TABLET, EXTENDED RELEASE ORAL 2 TIMES DAILY
Qty: 180 TABLET | Refills: 1 | Status: SHIPPED | OUTPATIENT
Start: 2023-07-21

## 2023-07-26 ENCOUNTER — OFFICE VISIT (OUTPATIENT)
Age: 38
End: 2023-07-26
Payer: COMMERCIAL

## 2023-07-26 VITALS
BODY MASS INDEX: 45.1 KG/M2 | HEART RATE: 72 BPM | WEIGHT: 315 LBS | DIASTOLIC BLOOD PRESSURE: 82 MMHG | HEIGHT: 70 IN | SYSTOLIC BLOOD PRESSURE: 127 MMHG

## 2023-07-26 DIAGNOSIS — Z79.4 TYPE 2 DIABETES MELLITUS WITH HYPERGLYCEMIA, WITH LONG-TERM CURRENT USE OF INSULIN (HCC): Primary | ICD-10-CM

## 2023-07-26 DIAGNOSIS — E11.65 TYPE 2 DIABETES MELLITUS WITH HYPERGLYCEMIA, WITH LONG-TERM CURRENT USE OF INSULIN (HCC): Primary | ICD-10-CM

## 2023-07-26 DIAGNOSIS — Z78.9 MALE-TO-FEMALE TRANSGENDER PERSON: ICD-10-CM

## 2023-07-26 PROCEDURE — 3074F SYST BP LT 130 MM HG: CPT | Performed by: INTERNAL MEDICINE

## 2023-07-26 PROCEDURE — 3079F DIAST BP 80-89 MM HG: CPT | Performed by: INTERNAL MEDICINE

## 2023-07-26 PROCEDURE — 99204 OFFICE O/P NEW MOD 45 MIN: CPT | Performed by: INTERNAL MEDICINE

## 2023-07-26 RX ORDER — SPIRONOLACTONE 50 MG/1
50 TABLET, FILM COATED ORAL DAILY
Qty: 90 TABLET | Refills: 1 | Status: SHIPPED | OUTPATIENT
Start: 2023-07-26

## 2023-07-26 NOTE — PROGRESS NOTES
Chief Complaint   Patient presents with    New Patient     PCP and pharmacy confirmed     Diabetes     History of Present Illness: Dayton Meredith is a 42 y.o. female with a past medical history significant for hypertension and type 2 diabetes seen for discussion related to gender dysphoria. \"She/her\"    We will be starting Ozempic soon, has not taken this yet. Sometimes holds meal associated insulin, is using 15 units of Lantus. Also taking lisinopril HCTZ for blood pressure. [place] often for work to [employer].  Primary concerns are breast development and hair growth. Past Medical History:   Diagnosis Date    Depression     Hypertension      History reviewed. No pertinent surgical history.   Current Outpatient Medications   Medication Sig    spironolactone (ALDACTONE) 50 MG tablet Take 1 tablet by mouth daily    metFORMIN (GLUCOPHAGE-XR) 500 MG extended release tablet Take 1 tablet by mouth 2 times daily    insulin glargine (LANTUS SOLOSTAR) 100 UNIT/ML injection pen INJECT 50  ONCE DAILY    insulin lispro (HUMALOG) 100 UNIT/ML SOLN injection vial INJECT 8 UNITS SUBCUTANEOUSLY THREE TIMES DAILY BEFORE MEAL(S) (Patient taking differently: INJECT 8 UNITS SUBCUTANEOUSLY THREE TIMES DAILY BEFORE MEAL(S) as needed)    Semaglutide,0.25 or 0.5MG/DOS, (OZEMPIC, 0.25 OR 0.5 MG/DOSE,) 2 MG/3ML SOPN Inject 0.25 mg into the skin once a week    lisinopril-hydroCHLOROthiazide (PRINZIDE;ZESTORETIC) 20-25 MG per tablet Take 1 tablet by mouth once daily    amLODIPine (NORVASC) 5 MG tablet Take 1 tablet by mouth once daily    Continuous Blood Gluc  (FREESTYLE LOLA 2 READER) FELISHA Use as directed    Continuous Blood Gluc Sensor (FREESTYLE LOLA 2 SENSOR) MISC USE AS DIRECTED DX  E11.9    albuterol sulfate HFA (PROVENTIL;VENTOLIN;PROAIR) 108 (90 Base) MCG/ACT inhaler Inhale 1 puff into the lungs every 6 hours as needed    aspirin 81 MG EC tablet Take 1 tablet by mouth as needed    insulin lispro, 1 Unit Dial,

## 2023-08-18 ENCOUNTER — OFFICE VISIT (OUTPATIENT)
Facility: CLINIC | Age: 38
End: 2023-08-18
Payer: COMMERCIAL

## 2023-08-18 VITALS
TEMPERATURE: 98.4 F | DIASTOLIC BLOOD PRESSURE: 55 MMHG | WEIGHT: 315 LBS | HEIGHT: 70 IN | RESPIRATION RATE: 16 BRPM | BODY MASS INDEX: 45.1 KG/M2 | HEART RATE: 72 BPM | SYSTOLIC BLOOD PRESSURE: 95 MMHG

## 2023-08-18 DIAGNOSIS — I15.2 HYPERTENSION COMPLICATING DIABETES (HCC): ICD-10-CM

## 2023-08-18 DIAGNOSIS — R06.83 SNORING: ICD-10-CM

## 2023-08-18 DIAGNOSIS — E11.59 HYPERTENSION COMPLICATING DIABETES (HCC): ICD-10-CM

## 2023-08-18 DIAGNOSIS — Z79.4 TYPE 2 DIABETES MELLITUS WITH HYPERGLYCEMIA, WITH LONG-TERM CURRENT USE OF INSULIN (HCC): Primary | ICD-10-CM

## 2023-08-18 DIAGNOSIS — Z78.9 MALE-TO-FEMALE TRANSGENDER PERSON: ICD-10-CM

## 2023-08-18 DIAGNOSIS — E11.65 TYPE 2 DIABETES MELLITUS WITH HYPERGLYCEMIA, WITH LONG-TERM CURRENT USE OF INSULIN (HCC): Primary | ICD-10-CM

## 2023-08-18 DIAGNOSIS — E66.01 MORBID OBESITY (HCC): ICD-10-CM

## 2023-08-18 DIAGNOSIS — Z79.4 LONG TERM (CURRENT) USE OF INSULIN (HCC): ICD-10-CM

## 2023-08-18 DIAGNOSIS — R60.0 BILATERAL LEG EDEMA: ICD-10-CM

## 2023-08-18 LAB — GLUCOSE, POC: 152 MG/DL

## 2023-08-18 PROCEDURE — 3078F DIAST BP <80 MM HG: CPT | Performed by: INTERNAL MEDICINE

## 2023-08-18 PROCEDURE — 3074F SYST BP LT 130 MM HG: CPT | Performed by: INTERNAL MEDICINE

## 2023-08-18 PROCEDURE — 99214 OFFICE O/P EST MOD 30 MIN: CPT | Performed by: INTERNAL MEDICINE

## 2023-08-18 PROCEDURE — 82962 GLUCOSE BLOOD TEST: CPT | Performed by: INTERNAL MEDICINE

## 2023-08-18 RX ORDER — SEMAGLUTIDE 1.34 MG/ML
0.5 INJECTION, SOLUTION SUBCUTANEOUS
Qty: 1 ADJUSTABLE DOSE PRE-FILLED PEN SYRINGE | Refills: 1 | OUTPATIENT
Start: 2023-08-18 | End: 2023-08-21 | Stop reason: SDUPTHER

## 2023-08-18 RX ORDER — SEMAGLUTIDE 1.34 MG/ML
0.25 INJECTION, SOLUTION SUBCUTANEOUS
COMMUNITY
End: 2023-08-18 | Stop reason: SDUPTHER

## 2023-08-18 SDOH — ECONOMIC STABILITY: HOUSING INSECURITY
IN THE LAST 12 MONTHS, WAS THERE A TIME WHEN YOU DID NOT HAVE A STEADY PLACE TO SLEEP OR SLEPT IN A SHELTER (INCLUDING NOW)?: NO

## 2023-08-18 SDOH — ECONOMIC STABILITY: INCOME INSECURITY: HOW HARD IS IT FOR YOU TO PAY FOR THE VERY BASICS LIKE FOOD, HOUSING, MEDICAL CARE, AND HEATING?: HARD

## 2023-08-18 SDOH — ECONOMIC STABILITY: FOOD INSECURITY: WITHIN THE PAST 12 MONTHS, THE FOOD YOU BOUGHT JUST DIDN'T LAST AND YOU DIDN'T HAVE MONEY TO GET MORE.: SOMETIMES TRUE

## 2023-08-18 SDOH — ECONOMIC STABILITY: FOOD INSECURITY: WITHIN THE PAST 12 MONTHS, YOU WORRIED THAT YOUR FOOD WOULD RUN OUT BEFORE YOU GOT MONEY TO BUY MORE.: SOMETIMES TRUE

## 2023-08-18 ASSESSMENT — PATIENT HEALTH QUESTIONNAIRE - PHQ9
2. FEELING DOWN, DEPRESSED OR HOPELESS: 0
SUM OF ALL RESPONSES TO PHQ QUESTIONS 1-9: 0
SUM OF ALL RESPONSES TO PHQ QUESTIONS 1-9: 0
1. LITTLE INTEREST OR PLEASURE IN DOING THINGS: 0
SUM OF ALL RESPONSES TO PHQ QUESTIONS 1-9: 0
SUM OF ALL RESPONSES TO PHQ9 QUESTIONS 1 & 2: 0
SUM OF ALL RESPONSES TO PHQ QUESTIONS 1-9: 0

## 2023-08-18 NOTE — PROGRESS NOTES
Ale Grimaldo is a 43 y.o. adult and presents with   Chief Complaint   Patient presents with    Follow-up    Leg Swelling     Bilateral        .  Subjective:  MS. CHASE- pt is transgender female. Pt travels a lot for work and does eat on the road. She has noticed frida le edema, which has responded to compression stockings  Pt is tolerating ozempic . 25mg weekly w/o SE. Type 2 DM-pt is on lantus 50 /humalog 8U w meals and metformin   -pt reports her glucoses have been  on current regimen   -she has been falling asleep at work    -pt denies snoring    Hemoglobin A1C, POC   Date Value Ref Range Status   07/12/2023 7.5 % Final        HTN  BP Readings from Last 3 Encounters:   08/18/23 (!) 95/55   07/26/23 127/82   07/12/23 106/72          Pt relays she has a h/o depression and was previously on sertraline w improved mood. She stopped it due to wt gain and felt better. Pt relays she has been having recurrent sxs of depression. She will accept a therapists info, declines psychiatry. Was rx'ed sertraline again, but is NOT currently taking    Morbid obesity-pt is UNinterested in bariatric surgery.    Wt Readings from Last 3 Encounters:   08/18/23 (!) 363 lb (164.7 kg)   07/26/23 (!) 378 lb 8 oz (171.7 kg)   07/12/23 (!) 379 lb (171.9 kg)      Review of Systems  Constitutional: negative for fevers, chills, anorexia and weight loss  Respiratory:  negative for cough, hemoptysis, dyspnea,wheezing  CV:   negative for chest pain, palpitations, lower extremity edema  GI:   negative for nausea, vomiting, diarrhea, abdominal pain,melena  Endo:               negative for polyuria,polydipsia,polyphagia,heat intolerance  Musculoskel: negative for myalgias, arthralgias, back pain, muscle weakness, joint pain  Neurological:  negative for headaches, dizziness, vertigo, memory problems and gait   Behavl/Psych: negative for feelings of anxiety, depression, mood changes denies SI    Current Outpatient Medications on

## 2023-08-21 RX ORDER — SEMAGLUTIDE 1.34 MG/ML
0.5 INJECTION, SOLUTION SUBCUTANEOUS
Qty: 1 ADJUSTABLE DOSE PRE-FILLED PEN SYRINGE | Refills: 1 | Status: SHIPPED | OUTPATIENT
Start: 2023-08-21

## 2023-08-28 ENCOUNTER — HOSPITAL ENCOUNTER (OUTPATIENT)
Facility: HOSPITAL | Age: 38
Discharge: HOME | End: 2023-08-30
Payer: COMMERCIAL

## 2023-08-28 DIAGNOSIS — R60.0 BILATERAL LEG EDEMA: ICD-10-CM

## 2023-08-28 PROCEDURE — 93970 EXTREMITY STUDY: CPT

## 2023-08-28 RX ORDER — SPIRONOLACTONE 50 MG/1
50 TABLET, FILM COATED ORAL DAILY
Qty: 90 TABLET | Refills: 0 | Status: SHIPPED | OUTPATIENT
Start: 2023-08-28

## 2023-08-29 PROBLEM — M71.20 BAKER'S CYST OF KNEE: Status: ACTIVE | Noted: 2023-08-29

## 2023-09-06 DIAGNOSIS — E11.65 TYPE 2 DIABETES MELLITUS WITH HYPERGLYCEMIA, WITH LONG-TERM CURRENT USE OF INSULIN (HCC): ICD-10-CM

## 2023-09-06 DIAGNOSIS — Z79.4 TYPE 2 DIABETES MELLITUS WITH HYPERGLYCEMIA, WITH LONG-TERM CURRENT USE OF INSULIN (HCC): ICD-10-CM

## 2023-09-11 ENCOUNTER — TELEPHONE (OUTPATIENT)
Facility: CLINIC | Age: 38
End: 2023-09-11

## 2023-09-11 NOTE — TELEPHONE ENCOUNTER
Pt called to check on status of refills requested on 9/6. Said they have taken the last pill for their blood pressure medication and metformin today.

## 2023-09-12 RX ORDER — METFORMIN HYDROCHLORIDE 500 MG/1
500 TABLET, EXTENDED RELEASE ORAL 2 TIMES DAILY
Qty: 180 TABLET | Refills: 3 | Status: SHIPPED | OUTPATIENT
Start: 2023-09-12

## 2023-09-12 RX ORDER — AMLODIPINE BESYLATE 5 MG/1
5 TABLET ORAL DAILY
Qty: 90 TABLET | Refills: 3 | Status: SHIPPED | OUTPATIENT
Start: 2023-09-12

## 2023-09-12 RX ORDER — LISINOPRIL AND HYDROCHLOROTHIAZIDE 25; 20 MG/1; MG/1
1 TABLET ORAL DAILY
Qty: 90 TABLET | Refills: 3 | Status: SHIPPED | OUTPATIENT
Start: 2023-09-12

## 2023-09-18 ENCOUNTER — TELEMEDICINE (OUTPATIENT)
Facility: CLINIC | Age: 38
End: 2023-09-18
Payer: COMMERCIAL

## 2023-09-18 DIAGNOSIS — Z79.4 TYPE 2 DIABETES MELLITUS WITH HYPERGLYCEMIA, WITH LONG-TERM CURRENT USE OF INSULIN (HCC): ICD-10-CM

## 2023-09-18 DIAGNOSIS — E11.65 TYPE 2 DIABETES MELLITUS WITH HYPERGLYCEMIA, WITH LONG-TERM CURRENT USE OF INSULIN (HCC): ICD-10-CM

## 2023-09-18 DIAGNOSIS — E66.01 MORBID OBESITY (HCC): ICD-10-CM

## 2023-09-18 PROCEDURE — 99213 OFFICE O/P EST LOW 20 MIN: CPT | Performed by: INTERNAL MEDICINE

## 2023-09-18 RX ORDER — SEMAGLUTIDE 1.34 MG/ML
0.5 INJECTION, SOLUTION SUBCUTANEOUS
Qty: 1 ADJUSTABLE DOSE PRE-FILLED PEN SYRINGE | Refills: 1 | Status: CANCELLED | OUTPATIENT
Start: 2023-09-18

## 2023-09-18 RX ORDER — SEMAGLUTIDE 1.34 MG/ML
0.5 INJECTION, SOLUTION SUBCUTANEOUS
Qty: 1 ADJUSTABLE DOSE PRE-FILLED PEN SYRINGE | Refills: 1 | Status: SHIPPED | OUTPATIENT
Start: 2023-09-18

## 2023-09-18 RX ORDER — SEMAGLUTIDE 1.34 MG/ML
0.5 INJECTION, SOLUTION SUBCUTANEOUS
Qty: 1 ADJUSTABLE DOSE PRE-FILLED PEN SYRINGE | Refills: 1 | Status: SHIPPED | OUTPATIENT
Start: 2023-09-18 | End: 2023-09-18 | Stop reason: SDUPTHER

## 2023-09-18 RX ORDER — ALBUTEROL SULFATE 90 UG/1
1 AEROSOL, METERED RESPIRATORY (INHALATION) EVERY 6 HOURS PRN
Qty: 18 G | Refills: 2 | Status: SHIPPED | OUTPATIENT
Start: 2023-09-18

## 2023-09-18 SDOH — ECONOMIC STABILITY: FOOD INSECURITY: WITHIN THE PAST 12 MONTHS, THE FOOD YOU BOUGHT JUST DIDN'T LAST AND YOU DIDN'T HAVE MONEY TO GET MORE.: NEVER TRUE

## 2023-09-18 SDOH — ECONOMIC STABILITY: INCOME INSECURITY: HOW HARD IS IT FOR YOU TO PAY FOR THE VERY BASICS LIKE FOOD, HOUSING, MEDICAL CARE, AND HEATING?: NOT HARD AT ALL

## 2023-09-18 SDOH — ECONOMIC STABILITY: FOOD INSECURITY: WITHIN THE PAST 12 MONTHS, YOU WORRIED THAT YOUR FOOD WOULD RUN OUT BEFORE YOU GOT MONEY TO BUY MORE.: NEVER TRUE

## 2023-09-18 ASSESSMENT — ANXIETY QUESTIONNAIRES
4. TROUBLE RELAXING: 0
GAD7 TOTAL SCORE: 0
3. WORRYING TOO MUCH ABOUT DIFFERENT THINGS: 0
1. FEELING NERVOUS, ANXIOUS, OR ON EDGE: 0
7. FEELING AFRAID AS IF SOMETHING AWFUL MIGHT HAPPEN: 0
5. BEING SO RESTLESS THAT IT IS HARD TO SIT STILL: 0
2. NOT BEING ABLE TO STOP OR CONTROL WORRYING: 0
6. BECOMING EASILY ANNOYED OR IRRITABLE: 0

## 2023-09-18 ASSESSMENT — PATIENT HEALTH QUESTIONNAIRE - PHQ9
1. LITTLE INTEREST OR PLEASURE IN DOING THINGS: 0
SUM OF ALL RESPONSES TO PHQ9 QUESTIONS 1 & 2: 0
SUM OF ALL RESPONSES TO PHQ QUESTIONS 1-9: 0
2. FEELING DOWN, DEPRESSED OR HOPELESS: 0
SUM OF ALL RESPONSES TO PHQ QUESTIONS 1-9: 0

## 2023-09-18 NOTE — PROGRESS NOTES
Stella Causey is a 34 y.o. adult  HIPAA verified by two patient identifiers. Health Maintenance Due   Topic Date Due    Hepatitis B vaccine (1 of 3 - 3-dose series) Never done    COVID-19 Vaccine (1) Never done    Varicella vaccine (1 of 2 - 2-dose childhood series) Never done    Pneumococcal 0-64 years Vaccine (1 - PCV) Never done    Diabetic foot exam  Never done    Diabetic Alb to Cr ratio (uACR) test  Never done    Diabetic retinal exam  Never done    Hepatitis C screen  Never done    DTaP/Tdap/Td vaccine (1 - Tdap) Never done    Lipids  04/20/2023    GFR test (Diabetes, CKD 3-4, OR last GFR 15-59)  07/20/2023    Flu vaccine (1) 08/01/2023     Chief Complaint   Patient presents with    Follow-up         9/18/2023     1:06 PM   Patient-Reported Vitals   Patient-Reported Weight 363lb         Pain Scale: 0/10  Pain Location:   1. Have you been to the ER, urgent care clinic since your last visit? Hospitalized since your last visit? No    2. Have you seen or consulted any other health care providers outside of the [address] [place] [address] since your last visit? Include any pap smears or colon screening.  No
signs of ataxia         [] Normal range of motion of neck        [] Abnormal-       Neurological:        [] No Facial Asymmetry (Cranial nerve 7 motor function) (limited exam to video visit)          [] No gaze palsy        [] Abnormal-         Skin:        [] No significant exanthematous lesions or discoloration noted on facial skin         [] Abnormal-            Psychiatric:       [] Normal Affect [] No Hallucinations        [] Abnormal-     Other pertinent observable physical exam findings-     ASSESSMENT/PLAN:  1. Type 2 diabetes mellitus with hyperglycemia, with long-term current use of insulin (HCC)  Cont current regimen    2. Morbid obesity ([address])  Cont current regimen of .5mg weekly and reasses [place]      No follow-ups on file. Lupe Husain, was evaluated through a synchronous (real-time) audio-video encounter. The patient (or guardian if applicable) is aware that this is a billable service, which includes applicable co-pays. This Virtual Visit was conducted with patient's (and/or legal guardian's) consent. Patient identification was verified, and a caregiver was present when appropriate. The patient was located at Other: [place]  Provider was located at Home ([address] [place] [address]): [place]        Total time spent on this encounter: Not billed by time    --Yumi Chandler MD on 9/18/2023 at 1:12 PM    An electronic signature was used to authenticate this note.

## 2023-12-08 RX ORDER — SPIRONOLACTONE 50 MG/1
50 TABLET, FILM COATED ORAL DAILY
Qty: 90 TABLET | Refills: 0 | OUTPATIENT
Start: 2023-12-08

## 2023-12-26 DIAGNOSIS — E11.65 TYPE 2 DIABETES MELLITUS WITH HYPERGLYCEMIA, WITH LONG-TERM CURRENT USE OF INSULIN (HCC): ICD-10-CM

## 2023-12-26 DIAGNOSIS — E66.01 MORBID OBESITY (HCC): ICD-10-CM

## 2023-12-26 DIAGNOSIS — Z79.4 TYPE 2 DIABETES MELLITUS WITH HYPERGLYCEMIA, WITH LONG-TERM CURRENT USE OF INSULIN (HCC): ICD-10-CM

## 2023-12-26 RX ORDER — SEMAGLUTIDE 2.68 MG/ML
INJECTION, SOLUTION SUBCUTANEOUS
OUTPATIENT
Start: 2023-12-26

## 2023-12-26 RX ORDER — SEMAGLUTIDE 1.34 MG/ML
0.25 INJECTION, SOLUTION SUBCUTANEOUS
Qty: 3 ML | Refills: 2 | Status: CANCELLED | OUTPATIENT
Start: 2023-12-26

## 2024-01-17 ENCOUNTER — TELEPHONE (OUTPATIENT)
Dept: FAMILY MEDICINE CLINIC | Age: 39
End: 2024-01-17

## 2024-01-17 NOTE — TELEPHONE ENCOUNTER
Patient called to invite to the Hypertension Management Program through [facility]. Patient accepted.

## 2024-02-20 ENCOUNTER — TELEPHONE (OUTPATIENT)
Facility: CLINIC | Age: 39
End: 2024-02-20

## 2024-02-20 NOTE — TELEPHONE ENCOUNTER
Pt is requesting a refill on the following medications:    insulin glargine (LANTUS SOLOSTAR) 100 UNIT/ML injection pen     Semaglutide,0.25 or 0.5MG/DOS, (OZEMPIC, 0.25 OR 0.5 MG/DOSE,) 2 MG/1.5ML SOPN

## 2024-02-28 DIAGNOSIS — E11.65 TYPE 2 DIABETES MELLITUS WITH HYPERGLYCEMIA, WITH LONG-TERM CURRENT USE OF INSULIN (HCC): ICD-10-CM

## 2024-02-28 DIAGNOSIS — E66.01 MORBID OBESITY (HCC): ICD-10-CM

## 2024-02-28 DIAGNOSIS — Z79.4 TYPE 2 DIABETES MELLITUS WITH HYPERGLYCEMIA, WITH LONG-TERM CURRENT USE OF INSULIN (HCC): ICD-10-CM

## 2024-02-28 NOTE — TELEPHONE ENCOUNTER
Pt is requesting a refill on the following medications:     insulin glargine (LANTUS SOLOSTAR) 100 UNIT/ML injection pen      Semaglutide,0.25 or 0.5MG/DOS, (OZEMPIC, 0.25 OR 0.5 MG/DOSE,) 2 MG/1.5ML SOPN                To Georgi   F. [phone]   Not found in epic

## 2024-02-29 RX ORDER — INSULIN GLARGINE 100 [IU]/ML
INJECTION, SOLUTION SUBCUTANEOUS
Qty: 15 ML | Refills: 1 | Status: SHIPPED | OUTPATIENT
Start: 2024-02-29

## 2024-02-29 RX ORDER — SEMAGLUTIDE 1.34 MG/ML
0.5 INJECTION, SOLUTION SUBCUTANEOUS
Qty: 3 ML | Refills: 1 | Status: SHIPPED | OUTPATIENT
Start: 2024-02-29

## 2024-03-01 ENCOUNTER — TELEPHONE (OUTPATIENT)
Facility: CLINIC | Age: 39
End: 2024-03-01

## 2024-03-01 NOTE — TELEPHONE ENCOUNTER
I will need the Lantus and Ozempic printed on prescription forms to fax to this pharmacy.Thanks.    [facility]  [phone] Fax

## 2024-03-07 DIAGNOSIS — Z79.4 TYPE 2 DIABETES MELLITUS WITH HYPERGLYCEMIA, WITH LONG-TERM CURRENT USE OF INSULIN (HCC): ICD-10-CM

## 2024-03-07 DIAGNOSIS — E66.01 MORBID OBESITY (HCC): ICD-10-CM

## 2024-03-07 DIAGNOSIS — E11.65 TYPE 2 DIABETES MELLITUS WITH HYPERGLYCEMIA, WITH LONG-TERM CURRENT USE OF INSULIN (HCC): ICD-10-CM

## 2024-03-07 RX ORDER — SEMAGLUTIDE 2.68 MG/ML
2 INJECTION, SOLUTION SUBCUTANEOUS
Qty: 3 ML | Refills: 2 | Status: SHIPPED | OUTPATIENT
Start: 2024-03-07 | End: 2024-03-07 | Stop reason: CLARIF

## 2024-03-07 RX ORDER — SEMAGLUTIDE 0.68 MG/ML
0.5 INJECTION, SOLUTION SUBCUTANEOUS WEEKLY
Qty: 3 ML | Refills: 2 | Status: SHIPPED | OUTPATIENT
Start: 2024-03-07

## 2024-03-07 RX ORDER — INSULIN GLARGINE 100 [IU]/ML
INJECTION, SOLUTION SUBCUTANEOUS
Qty: 15 ML | Refills: 1 | Status: SHIPPED | OUTPATIENT
Start: 2024-03-07

## 2024-03-27 ENCOUNTER — TELEMEDICINE (OUTPATIENT)
Facility: CLINIC | Age: 39
End: 2024-03-27
Payer: COMMERCIAL

## 2024-03-27 DIAGNOSIS — I15.2 HYPERTENSION COMPLICATING DIABETES (HCC): ICD-10-CM

## 2024-03-27 DIAGNOSIS — E11.65 TYPE 2 DIABETES MELLITUS WITH HYPERGLYCEMIA, WITH LONG-TERM CURRENT USE OF INSULIN (HCC): Primary | ICD-10-CM

## 2024-03-27 DIAGNOSIS — E11.59 HYPERTENSION COMPLICATING DIABETES (HCC): ICD-10-CM

## 2024-03-27 DIAGNOSIS — Z79.4 LONG TERM (CURRENT) USE OF INSULIN (HCC): ICD-10-CM

## 2024-03-27 DIAGNOSIS — E66.01 MORBID OBESITY (HCC): ICD-10-CM

## 2024-03-27 DIAGNOSIS — Z79.4 TYPE 2 DIABETES MELLITUS WITH HYPERGLYCEMIA, WITH LONG-TERM CURRENT USE OF INSULIN (HCC): Primary | ICD-10-CM

## 2024-03-27 PROCEDURE — 99214 OFFICE O/P EST MOD 30 MIN: CPT | Performed by: INTERNAL MEDICINE

## 2024-03-27 ASSESSMENT — PATIENT HEALTH QUESTIONNAIRE - PHQ9
SUM OF ALL RESPONSES TO PHQ9 QUESTIONS 1 & 2: 0
SUM OF ALL RESPONSES TO PHQ QUESTIONS 1-9: 0
1. LITTLE INTEREST OR PLEASURE IN DOING THINGS: NOT AT ALL
SUM OF ALL RESPONSES TO PHQ QUESTIONS 1-9: 0
2. FEELING DOWN, DEPRESSED OR HOPELESS: NOT AT ALL

## 2024-03-27 ASSESSMENT — ANXIETY QUESTIONNAIRES
GAD7 TOTAL SCORE: 0
IF YOU CHECKED OFF ANY PROBLEMS ON THIS QUESTIONNAIRE, HOW DIFFICULT HAVE THESE PROBLEMS MADE IT FOR YOU TO DO YOUR WORK, TAKE CARE OF THINGS AT HOME, OR GET ALONG WITH OTHER PEOPLE: NOT DIFFICULT AT ALL
5. BEING SO RESTLESS THAT IT IS HARD TO SIT STILL: NOT AT ALL
4. TROUBLE RELAXING: NOT AT ALL
7. FEELING AFRAID AS IF SOMETHING AWFUL MIGHT HAPPEN: NOT AT ALL
2. NOT BEING ABLE TO STOP OR CONTROL WORRYING: NOT AT ALL
1. FEELING NERVOUS, ANXIOUS, OR ON EDGE: NOT AT ALL
6. BECOMING EASILY ANNOYED OR IRRITABLE: NOT AT ALL
3. WORRYING TOO MUCH ABOUT DIFFERENT THINGS: NOT AT ALL

## 2024-03-27 NOTE — PROGRESS NOTES
3/27/2024    TELEHEALTH EVALUATION -- Audio/Visual    HPI:    Melissa Coreas (:  1985) has requested an audio/video evaluation for the following concern(s):    Chief Complaint   Patient presents with    Medication Check     MS. Coreas- pt is transgender female.        Pt travels a lot for work and does eat on the road. She has noticed frida le edema, which has responded to compression stockings  Pt is having some SE on ozempic .5mg weekly-GERD/constipation. We will cont .5mg dose for now       Type 2 DM-ENDO is Dr. Connolly  -pt is on lantus 40 , metformin and ozempic   -pt reports her glucoses have been  on current regimen   -she has been falling asleep at work    -pt denies snoring    A1c @ endo was 5.6% in December    HTN  BP Readings from Last 3 Encounters:   23 (!) 141/102   23 (!) 95/55   23 127/82          Pt relays she has a h/o depression and was previously on sertraline w improved mood. She stopped it due to wt gain and felt better.  Pt relays she has been having recurrent sxs of depression. She will accept a therapists info, declines psychiatry. Was rx'ed sertraline again, but is NOT currently taking    Morbid obesity-pt is UNinterested in bariatric surgery.   Wt Readings from Last 3 Encounters:   23 (!) 159.7 kg (352 lb)   23 (!) 164.7 kg (363 lb)   23 (!) 171.7 kg (378 lb 8 oz)             Prior to Visit Medications    Medication Sig Taking? Authorizing Provider   insulin glargine (LANTUS SOLOSTAR) 100 UNIT/ML injection pen INJECT 50  ONCE DAILY Yes Rossy Coy MD   Semaglutide,0.25 or 0.5MG/DOS, (OZEMPIC, 0.25 OR 0.5 MG/DOSE,) 2 MG/3ML SOPN Inject 0.5 mg into the skin once a week Yes Rossy Coy MD   spironolactone (ALDACTONE) 50 MG tablet Take 1 tablet by mouth daily Yes Debora Connolly MD   estradiol 0.025 MG/24HR PTTW Place 1 patch onto the skin Twice a Week Yes Debora Connolly MD   albuterol sulfate HFA (PROVENTIL;VENTOLIN;PROAIR) 108

## 2024-03-27 NOTE — PROGRESS NOTES
Chief Complaint   Patient presents with    Medication Check     \"Have you been to the ER, urgent care clinic since your last visit?  Hospitalized since your last visit?\"    NO    “Have you seen or consulted any other health care providers outside of [facility] since your last visit?”    NO            Click Here for Release of Records Request  HIPPA confirmed by two patient identifiers.

## 2024-04-27 NOTE — ED PROVIDER NOTES
HPI Comments: 27 y.o. male with past medical history significant for Hypertension and Depression who presents from Home with chief complaint of Facial Swelling. Patient is currently transitioning from male to female. Patient states onset \"three days ago\" of left sided facial swelling. Pt states gradually worsening symptoms since onset. Pt states constant left sided facial pain and swelling. Pt reports taking Benadryl for symptoms with no relief. Pt reports getting all vaccines as a child. Pt denies fever, chills, cough, congestion, shortness of breath, chest pain, abdominal pain, nausea, vomiting, diarrhea, difficulty with urination or dysuria. There are no other acute medical concerns at this time. PCP: Jamal Livingston MD 
 
Note written by Chloe Anderson, as dictated by Sherman Ashford MD 4:37 PM 
 
The history is provided by the patient. Past Medical History:  
Diagnosis Date  Depression  Hypertension History reviewed. No pertinent surgical history. Family History:  
Problem Relation Age of Onset  Hypertension Mother  Hypertension Father Social History Social History  Marital status: SINGLE Spouse name: N/A  
 Number of children: N/A  
 Years of education: N/A Occupational History  Not on file. Social History Main Topics  Smoking status: Light Tobacco Smoker Types: Cigarettes  Smokeless tobacco: Never Used  Alcohol use Yes Comment: socially  Drug use: No  
 Sexual activity: Not Currently Other Topics Concern  Not on file Social History Narrative ALLERGIES: Review of patient's allergies indicates no known allergies. Review of Systems Constitutional: Negative for chills and fever. HENT: Positive for facial swelling. Negative for congestion. Respiratory: Negative for cough and shortness of breath. Cardiovascular: Negative for chest pain. Gastrointestinal: Negative for abdominal pain, diarrhea, nausea and vomiting. Genitourinary: Negative for difficulty urinating and dysuria. All other systems reviewed and are negative. Vitals:  
 09/09/18 1611 BP: (!) 169/108 Pulse: 90 Resp: 18 Temp: 98.2 °F (36.8 °C) SpO2: 94% Weight: (!) 165.8 kg (365 lb 9.6 oz) Height: 5' 11\" (1.803 m) Physical Exam  
Constitutional: He is oriented to person, place, and time. He appears well-developed and well-nourished. HENT:  
Head: Normocephalic and atraumatic. Nose: Nose normal.  
Eyes: Conjunctivae and EOM are normal. Pupils are equal, round, and reactive to light. Neck: Normal range of motion. inflammation of one parotid glands,consist with parotitis Cardiovascular: Normal rate, regular rhythm, normal heart sounds and intact distal pulses. Pulmonary/Chest: Effort normal and breath sounds normal.  
Abdominal: Soft. Bowel sounds are normal.  
Musculoskeletal: Normal range of motion. Neurological: He is oriented to person, place, and time. He has normal reflexes. Skin: Skin is warm and dry. Psychiatric: He has a normal mood and affect. His behavior is normal.  
Nursing note and vitals reviewed. Note written by Chloe Roldan, as dictated by Diane Kelley MD 4:37 PM 
 
[facility] 
 
 
ED Course Procedures 1% lidocaine 1% lidocaine

## 2024-05-26 NOTE — PROCEDURES
Cardiac Catheterization Procedure Note   Patient: Brianne Ruiz  MRN: [id number]  SSN: [id number]   YOB: 1985 Age: 33 y.o.   Sex: male    Date of Procedure: 7/7/2017   Pre-procedure Diagnosis: Chest pain CCS Class III  Post-procedure Diagnosis: Non-cardiac Chest Pain  Procedure: Left Heart Cath  :  Dr. Ledy Don MD    Assistant(s):  None  Anesthesia: Moderate Sedation   Estimated Blood Loss: Less than 10 mL   Specimens Removed: None  Findings: normal coronary arteries  Complications: None   Implants:  None  Signed by:  Ledy Don MD  7/7/2017  10:22 AM
needed assist

## 2024-08-20 DIAGNOSIS — E11.65 TYPE 2 DIABETES MELLITUS WITH HYPERGLYCEMIA, WITH LONG-TERM CURRENT USE OF INSULIN (HCC): ICD-10-CM

## 2024-08-20 DIAGNOSIS — Z79.4 TYPE 2 DIABETES MELLITUS WITH HYPERGLYCEMIA, WITH LONG-TERM CURRENT USE OF INSULIN (HCC): ICD-10-CM

## 2024-08-20 RX ORDER — INSULIN GLARGINE 100 [IU]/ML
INJECTION, SOLUTION SUBCUTANEOUS
Qty: 15 ML | Refills: 0 | Status: SHIPPED | OUTPATIENT
Start: 2024-08-20

## 2024-08-21 RX ORDER — ESTRADIOL 0.03 MG/D
1 FILM, EXTENDED RELEASE TRANSDERMAL
Qty: 8 PATCH | Refills: 1 | Status: SHIPPED | OUTPATIENT
Start: 2024-08-22

## 2024-08-21 RX ORDER — SPIRONOLACTONE 50 MG/1
50 TABLET, FILM COATED ORAL DAILY
Qty: 90 TABLET | Refills: 0 | Status: SHIPPED | OUTPATIENT
Start: 2024-08-21

## 2024-10-15 DIAGNOSIS — E11.65 TYPE 2 DIABETES MELLITUS WITH HYPERGLYCEMIA, WITH LONG-TERM CURRENT USE OF INSULIN (HCC): ICD-10-CM

## 2024-10-15 DIAGNOSIS — Z79.4 TYPE 2 DIABETES MELLITUS WITH HYPERGLYCEMIA, WITH LONG-TERM CURRENT USE OF INSULIN (HCC): ICD-10-CM

## 2024-10-15 RX ORDER — INSULIN GLARGINE 100 [IU]/ML
INJECTION, SOLUTION SUBCUTANEOUS
Qty: 15 ML | Refills: 0 | Status: SHIPPED | OUTPATIENT
Start: 2024-10-15

## 2024-10-15 NOTE — TELEPHONE ENCOUNTER
Last appointment: 03/27/2024 Virtual visit MD Coy   Next appointment: Nothing scheduled   Previous refill encounter(s):   09/12/2023 Freestyle Lise 2 [place] #2 with 5 refills,   09/18/2023:  - Freestyle Lise 2 S[place] #2 with 5 refills,   - Albuterol HFA INH #18 grams with 2 refills,   03/07/2024 Ozempic #3 ml with 2 refills     For Pharmacy Admin Tracking Only    Program: Medication Refill  Intervention Detail: New Rx: 4, reason: Patient Preference  Time Spent (min): 5  Requested Prescriptions     Pending Prescriptions Disp Refills    Continuous Glucose  (FREESTYLE LISE 2 READER) FELISHA 1 each 0     Sig: Use as directed    albuterol sulfate HFA (PROVENTIL;VENTOLIN;PROAIR) 108 (90 Base) MCG/ACT inhaler 18 g 0     Sig: Inhale 1 puff into the lungs every 6 hours as needed for Wheezing    Continuous Glucose Sensor (FREESTYLE LISE 2 SENSOR) MISC 6 each 0     Sig: Use as directed    Semaglutide,0.25 or 0.5MG/DOS, (OZEMPIC, 0.25 OR 0.5 MG/DOSE,) 2 MG/3ML SOPN 9 mL 0     Sig: Inject 0.5 mg into the skin once a week

## 2024-10-16 RX ORDER — SEMAGLUTIDE 0.68 MG/ML
0.5 INJECTION, SOLUTION SUBCUTANEOUS WEEKLY
Qty: 9 ML | Refills: 0 | Status: SHIPPED | OUTPATIENT
Start: 2024-10-16

## 2024-10-16 RX ORDER — ALBUTEROL SULFATE 90 UG/1
1 INHALANT RESPIRATORY (INHALATION) EVERY 6 HOURS PRN
Qty: 18 G | Refills: 2 | Status: SHIPPED | OUTPATIENT
Start: 2024-10-16

## 2024-11-12 RX ORDER — METFORMIN HYDROCHLORIDE 500 MG/1
500 TABLET, EXTENDED RELEASE ORAL 2 TIMES DAILY
Qty: 180 TABLET | Refills: 0 | Status: SHIPPED | OUTPATIENT
Start: 2024-11-12

## 2024-11-12 RX ORDER — AMLODIPINE BESYLATE 5 MG/1
5 TABLET ORAL DAILY
Qty: 90 TABLET | Refills: 0 | Status: SHIPPED | OUTPATIENT
Start: 2024-11-12

## 2024-11-12 NOTE — TELEPHONE ENCOUNTER
Last appointment: 03/27/2024 Virtual visit MD Coy   Next appointment: No show 09/20/2024 - Nothing rescheduled   Previous refill encounter(s):   09/12/2023:  - Glucophage-XR #180 with 3 refills,   - Norvasc #90 with 3 refills.     For Pharmacy Admin Tracking Only    Program: Medication Refill  Intervention Detail: New Rx: 2, reason: Patient Preference  Time Spent (min): 5    Requested Prescriptions     Pending Prescriptions Disp Refills    metFORMIN (GLUCOPHAGE-XR) 500 MG extended release tablet 180 tablet 0     Sig: Take 1 tablet by mouth 2 times daily    amLODIPine (NORVASC) 5 MG tablet 90 tablet 0     Sig: Take 1 tablet by mouth daily

## 2024-11-27 NOTE — TELEPHONE ENCOUNTER
Last appointment: 03/27/2024 Virtual visit MD Coy   Next appointment: Nothing scheduled   Previous refill encounter(s):   09/12/2023 Prinzide #90 with 3 refills.     For Pharmacy Admin Tracking Only    Program: Medication Refill  Intervention Detail: New Rx: 1, reason: Patient Preference  Time Spent (min): 5    Requested Prescriptions     Pending Prescriptions Disp Refills    lisinopril-hydroCHLOROthiazide (PRINZIDE;ZESTORETIC) 20-25 MG per tablet 90 tablet 0     Sig: Take 1 tablet by mouth daily

## 2024-11-29 RX ORDER — LISINOPRIL AND HYDROCHLOROTHIAZIDE 20; 25 MG/1; MG/1
1 TABLET ORAL DAILY
Qty: 90 TABLET | Refills: 0 | Status: SHIPPED | OUTPATIENT
Start: 2024-11-29

## 2025-01-10 ENCOUNTER — TELEMEDICINE (OUTPATIENT)
Age: 40
End: 2025-01-10

## 2025-01-10 DIAGNOSIS — E11.65 TYPE 2 DIABETES MELLITUS WITH HYPERGLYCEMIA, WITH LONG-TERM CURRENT USE OF INSULIN (HCC): ICD-10-CM

## 2025-01-10 DIAGNOSIS — Z79.4 TYPE 2 DIABETES MELLITUS WITH HYPERGLYCEMIA, WITH LONG-TERM CURRENT USE OF INSULIN (HCC): ICD-10-CM

## 2025-01-10 RX ORDER — ESTRADIOL 0.03 MG/D
1 FILM, EXTENDED RELEASE TRANSDERMAL
Qty: 8 PATCH | Refills: 1 | Status: SHIPPED | OUTPATIENT
Start: 2025-01-13

## 2025-01-10 RX ORDER — INSULIN GLARGINE 100 [IU]/ML
INJECTION, SOLUTION SUBCUTANEOUS
Qty: 45 ML | Refills: 3 | Status: SHIPPED | OUTPATIENT
Start: 2025-01-10

## 2025-01-10 RX ORDER — SPIRONOLACTONE 50 MG/1
50 TABLET, FILM COATED ORAL DAILY
Qty: 90 TABLET | Refills: 3 | Status: SHIPPED | OUTPATIENT
Start: 2025-01-10

## 2025-01-10 RX ORDER — INSULIN LISPRO 100 [IU]/ML
8 INJECTION, SOLUTION INTRAVENOUS; SUBCUTANEOUS EVERY MORNING
Qty: 15 ML | Refills: 2 | Status: SHIPPED | OUTPATIENT
Start: 2025-01-10

## 2025-01-10 NOTE — PROGRESS NOTES
Chief Complaint   Patient presents with    Diabetes    Medication Refill     History of Present Illness: Melissa Coreas is a 39 y.o. female with a past medical history significant for hypertension and type 2 diabetes seen for discussion related to gender dysphoria.    \"She/her\"    We will be starting Ozempic soon, has not taken this yet.  Sometimes holds meal associated insulin, is using 15 units of Lantus.  Also taking lisinopril HCTZ for blood pressure.  Travel[place] often for [place] to the [place].  Primary concerns are breast development and hair growth.    12/21/2023: Developed severe nausea and constipation with Ozempic, has not taken in a few months.  Will be resuming this in February when she returns from [facility].  Is taking 5 0 units of Lantus.  Also does take 8 units of short acting insulin 1 time a day.  Interested in going to diabetes education courses.  Has not ever been to one.  Initially developed diabetes after COVID and steroid exposure.  Did start spironolactone but did not have potassium check done.    01/10/2025: Bad side effects with ozempic- stopped taking it 6 months ago, has been taking lantus and humalog 8U with bfast, has been 116/127/153. Nothing >170. Also taking 2 metformin (1 at bfast and 1 at lunch or dinner). Was getting ozempic from [facility] for free.    No past surgical history on file.  Current Outpatient Medications   Medication Sig    lisinopril-hydroCHLOROthiazide (PRINZIDE;ZESTORETIC) 20-25 MG per tablet Take 1 tablet by mouth daily    metFORMIN (GLUCOPHAGE-XR) 500 MG extended release tablet Take 1 tablet by mouth 2 times daily    amLODIPine (NORVASC) 5 MG tablet Take 1 tablet by mouth daily    albuterol sulfate HFA (PROVENTIL;VENTOLIN;PROAIR) 108 (90 Base) MCG/ACT inhaler Inhale 1 puff into the lungs every 6 hours as needed for Wheezing    Semaglutide,0.25 or 0.5MG/DOS, (OZEMPIC, 0.25 OR 0.5 MG/DOSE,) 2 MG/3ML SOPN Inject 0.5 mg into the skin once a week    insulin

## 2025-01-29 ENCOUNTER — OFFICE VISIT (OUTPATIENT)
Facility: CLINIC | Age: 40
End: 2025-01-29
Payer: COMMERCIAL

## 2025-01-29 VITALS
BODY MASS INDEX: 45.1 KG/M2 | DIASTOLIC BLOOD PRESSURE: 87 MMHG | OXYGEN SATURATION: 97 % | TEMPERATURE: 98.1 F | SYSTOLIC BLOOD PRESSURE: 109 MMHG | HEART RATE: 62 BPM | HEIGHT: 70 IN | RESPIRATION RATE: 17 BRPM | WEIGHT: 315 LBS

## 2025-01-29 DIAGNOSIS — E66.01 MORBID OBESITY: ICD-10-CM

## 2025-01-29 DIAGNOSIS — Z79.4 TYPE 2 DIABETES MELLITUS WITH HYPERGLYCEMIA, WITH LONG-TERM CURRENT USE OF INSULIN (HCC): ICD-10-CM

## 2025-01-29 DIAGNOSIS — E11.9 HYPERTENSION COMPLICATING DIABETES (HCC): ICD-10-CM

## 2025-01-29 DIAGNOSIS — E11.65 TYPE 2 DIABETES MELLITUS WITH HYPERGLYCEMIA, WITH LONG-TERM CURRENT USE OF INSULIN (HCC): ICD-10-CM

## 2025-01-29 DIAGNOSIS — Z00.00 WELL ADULT EXAM: Primary | ICD-10-CM

## 2025-01-29 DIAGNOSIS — Z11.3 ROUTINE SCREENING FOR STI (SEXUALLY TRANSMITTED INFECTION): ICD-10-CM

## 2025-01-29 DIAGNOSIS — Z79.4 LONG TERM (CURRENT) USE OF INSULIN (HCC): ICD-10-CM

## 2025-01-29 DIAGNOSIS — I10 HYPERTENSION COMPLICATING DIABETES (HCC): ICD-10-CM

## 2025-01-29 LAB
GLUCOSE, POC: 86 MG/DL
HBA1C MFR BLD: 5.9 %

## 2025-01-29 PROCEDURE — 82962 GLUCOSE BLOOD TEST: CPT | Performed by: INTERNAL MEDICINE

## 2025-01-29 PROCEDURE — 83036 HEMOGLOBIN GLYCOSYLATED A1C: CPT | Performed by: INTERNAL MEDICINE

## 2025-01-29 PROCEDURE — 99396 PREV VISIT EST AGE 40-64: CPT | Performed by: INTERNAL MEDICINE

## 2025-01-29 PROCEDURE — 3074F SYST BP LT 130 MM HG: CPT | Performed by: INTERNAL MEDICINE

## 2025-01-29 PROCEDURE — 3079F DIAST BP 80-89 MM HG: CPT | Performed by: INTERNAL MEDICINE

## 2025-01-29 RX ORDER — SEMAGLUTIDE 0.68 MG/ML
0.25 INJECTION, SOLUTION SUBCUTANEOUS WEEKLY
Qty: 9 ML | Refills: 5 | OUTPATIENT
Start: 2025-01-29 | End: 2025-01-29 | Stop reason: SDUPTHER

## 2025-01-29 RX ORDER — SEMAGLUTIDE 0.68 MG/ML
0.25 INJECTION, SOLUTION SUBCUTANEOUS WEEKLY
Qty: 9 ML | Refills: 5 | Status: SHIPPED | OUTPATIENT
Start: 2025-01-29

## 2025-01-29 SDOH — ECONOMIC STABILITY: FOOD INSECURITY: WITHIN THE PAST 12 MONTHS, THE FOOD YOU BOUGHT JUST DIDN'T LAST AND YOU DIDN'T HAVE MONEY TO GET MORE.: NEVER TRUE

## 2025-01-29 SDOH — ECONOMIC STABILITY: FOOD INSECURITY: WITHIN THE PAST 12 MONTHS, YOU WORRIED THAT YOUR FOOD WOULD RUN OUT BEFORE YOU GOT MONEY TO BUY MORE.: NEVER TRUE

## 2025-01-29 ASSESSMENT — ANXIETY QUESTIONNAIRES
1. FEELING NERVOUS, ANXIOUS, OR ON EDGE: NOT AT ALL
6. BECOMING EASILY ANNOYED OR IRRITABLE: NOT AT ALL
5. BEING SO RESTLESS THAT IT IS HARD TO SIT STILL: NOT AT ALL
7. FEELING AFRAID AS IF SOMETHING AWFUL MIGHT HAPPEN: NOT AT ALL
3. WORRYING TOO MUCH ABOUT DIFFERENT THINGS: NOT AT ALL
2. NOT BEING ABLE TO STOP OR CONTROL WORRYING: NOT AT ALL
IF YOU CHECKED OFF ANY PROBLEMS ON THIS QUESTIONNAIRE, HOW DIFFICULT HAVE THESE PROBLEMS MADE IT FOR YOU TO DO YOUR WORK, TAKE CARE OF THINGS AT HOME, OR GET ALONG WITH OTHER PEOPLE: NOT DIFFICULT AT ALL
4. TROUBLE RELAXING: NOT AT ALL
GAD7 TOTAL SCORE: 0

## 2025-01-29 ASSESSMENT — PATIENT HEALTH QUESTIONNAIRE - PHQ9
SUM OF ALL RESPONSES TO PHQ QUESTIONS 1-9: 1
2. FEELING DOWN, DEPRESSED OR HOPELESS: SEVERAL DAYS
1. LITTLE INTEREST OR PLEASURE IN DOING THINGS: NOT AT ALL
SUM OF ALL RESPONSES TO PHQ9 QUESTIONS 1 & 2: 1
SUM OF ALL RESPONSES TO PHQ QUESTIONS 1-9: 1

## 2025-01-29 NOTE — PROGRESS NOTES
Chief Complaint   Patient presents with    Annual Exam     \"Have you been to the ER, urgent care clinic since your last visit?  Hospitalized since your last visit?\"    NO    “Have you seen or consulted any other health care providers outside our system since your last visit?”    NO      “Have you had a diabetic eye exam?”    YES - Where: [facility] about 6 months ago. Nurse/CMA to request most recent records if not in the chart     No diabetic eye exam on file          HIPPA confirmed by two patient identifiers.

## 2025-01-29 NOTE — PROGRESS NOTES
Melissa Chase is a 39 y.o. adult and presents with   Chief Complaint   Patient presents with    Annual Exam       .  Subjective:  MS. CHASE- pt is transgender female.     Pt was let go from her job.    Type 2 DM-pt is on lantus 50 /humalog 8U w meals and metformin   -pt reports her glucoses have been  on current regimen   -pt could not tolerate .5mg ozempic, but is interested in retrying .25mg dose   -pt denies snoring    Hemoglobin A1C, POC   Date Value Ref Range Status   01/29/2025 5.9 % Final        HTN-on spironolactone and amlodipine  BP Readings from Last 3 Encounters:   01/29/25 109/87   12/22/23 (!) 141/102   08/18/23 (!) 95/55          Pt relays she has a h/o depression and was previously on sertraline w improved mood. She stopped it due to wt gain and felt better.  Pt relays she has been having recurrent sxs of depression. She will accept a therapists info, declines psychiatry. Was rx'ed sertraline again, but is NOT currently taking    Morbid obesity-pt is UNinterested in bariatric surgery.   Wt Readings from Last 3 Encounters:   01/29/25 (!) 153.5 kg (338 lb 8 oz)   12/22/23 (!) 159.7 kg (352 lb)   08/18/23 (!) 164.7 kg (363 lb)      Review of Systems  Constitutional: negative for fevers, chills, anorexia and weight loss  Respiratory:  negative for cough, hemoptysis, dyspnea,wheezing  CV:   negative for chest pain, palpitations, lower extremity edema  GI:   negative for nausea, vomiting, diarrhea, abdominal pain,melena  Endo:               negative for polyuria,polydipsia,polyphagia,heat intolerance  Musculoskel: negative for myalgias, arthralgias, back pain, muscle weakness, joint pain  Neurological:  negative for headaches, dizziness, vertigo, memory problems and gait   Behavl/Psych: negative for feelings of anxiety, depression, mood changes denies SI    Current Outpatient Medications on File Prior to Visit   Medication Sig Dispense Refill    spironolactone (ALDACTONE) 50 MG tablet Take 1

## 2025-01-30 LAB
ALBUMIN SERPL-MCNC: 3.9 G/DL (ref 3.5–5)
ALBUMIN/GLOB SERPL: 0.9 (ref 1.1–2.2)
ALP SERPL-CCNC: 79 U/L (ref 45–117)
ALT SERPL-CCNC: 101 U/L (ref 12–78)
ANION GAP SERPL CALC-SCNC: 6 MMOL/L (ref 2–12)
AST SERPL-CCNC: 58 U/L (ref 15–37)
BILIRUB SERPL-MCNC: 0.4 MG/DL (ref 0.2–1)
BUN SERPL-MCNC: 14 MG/DL (ref 6–20)
BUN/CREAT SERPL: 14 (ref 12–20)
CALCIUM SERPL-MCNC: 9.2 MG/DL (ref 8.5–10.1)
CHLORIDE SERPL-SCNC: 103 MMOL/L (ref 97–108)
CO2 SERPL-SCNC: 26 MMOL/L (ref 21–32)
CREAT SERPL-MCNC: 0.98 MG/DL (ref 0.7–1.3)
EST. AVERAGE GLUCOSE BLD GHB EST-MCNC: 120 MG/DL
ESTRADIOL SERPL-MCNC: 41.3 PG/ML
GLOBULIN SER CALC-MCNC: 4.3 G/DL (ref 2–4)
GLUCOSE SERPL-MCNC: 81 MG/DL (ref 65–100)
HBA1C MFR BLD: 5.8 % (ref 4–5.6)
HIV 1+2 AB+HIV1 P24 AG SERPL QL IA: NONREACTIVE
HIV 1/2 RESULT COMMENT: NORMAL
POTASSIUM SERPL-SCNC: 4.2 MMOL/L (ref 3.5–5.1)
PROT SERPL-MCNC: 8.2 G/DL (ref 6.4–8.2)
SODIUM SERPL-SCNC: 135 MMOL/L (ref 136–145)

## 2025-02-03 LAB
C TRACH RRNA SPEC QL NAA+PROBE: NEGATIVE
N GONORRHOEA RRNA SPEC QL NAA+PROBE: NEGATIVE
SPECIMEN SOURCE: NORMAL
T VAGINALIS RRNA SPEC QL NAA+PROBE: NEGATIVE

## 2025-02-11 DIAGNOSIS — F64.9 GENDER DYSPHORIA: Primary | ICD-10-CM

## 2025-02-11 DIAGNOSIS — F64.9 GENDER DYSPHORIA: ICD-10-CM

## 2025-02-11 RX ORDER — ESTRADIOL 0.05 MG/D
1 PATCH TRANSDERMAL WEEKLY
Qty: 4 PATCH | Refills: 11 | Status: SHIPPED | OUTPATIENT
Start: 2025-02-11